# Patient Record
Sex: FEMALE | Race: WHITE | NOT HISPANIC OR LATINO | Employment: OTHER | URBAN - NONMETROPOLITAN AREA
[De-identification: names, ages, dates, MRNs, and addresses within clinical notes are randomized per-mention and may not be internally consistent; named-entity substitution may affect disease eponyms.]

---

## 2017-09-28 ENCOUNTER — HOSPITAL ENCOUNTER (EMERGENCY)
Facility: HOSPITAL | Age: 57
Discharge: ANOTHER HOSPITAL | End: 2017-09-28
Attending: EMERGENCY MEDICINE | Admitting: EMERGENCY MEDICINE

## 2017-09-28 ENCOUNTER — PREP FOR SURGERY (OUTPATIENT)
Dept: OTHER | Facility: HOSPITAL | Age: 57
End: 2017-09-28

## 2017-09-28 ENCOUNTER — APPOINTMENT (OUTPATIENT)
Dept: GENERAL RADIOLOGY | Facility: HOSPITAL | Age: 57
End: 2017-09-28
Attending: EMERGENCY MEDICINE

## 2017-09-28 ENCOUNTER — HOSPITAL ENCOUNTER (OUTPATIENT)
Facility: HOSPITAL | Age: 57
Setting detail: OBSERVATION
Discharge: HOME | End: 2017-09-29
Attending: INTERNAL MEDICINE | Admitting: INTERNAL MEDICINE

## 2017-09-28 VITALS
RESPIRATION RATE: 20 BRPM | HEART RATE: 81 BPM | WEIGHT: 270 LBS | HEIGHT: 69 IN | DIASTOLIC BLOOD PRESSURE: 73 MMHG | SYSTOLIC BLOOD PRESSURE: 116 MMHG | OXYGEN SATURATION: 93 % | BODY MASS INDEX: 39.99 KG/M2 | TEMPERATURE: 98.2 F

## 2017-09-28 DIAGNOSIS — I47.1 SVT (SUPRAVENTRICULAR TACHYCARDIA) (HCC): Primary | ICD-10-CM

## 2017-09-28 DIAGNOSIS — R00.0 WIDE-COMPLEX TACHYCARDIA: ICD-10-CM

## 2017-09-28 DIAGNOSIS — I10 ESSENTIAL HYPERTENSION: ICD-10-CM

## 2017-09-28 DIAGNOSIS — R00.0 WIDE-COMPLEX TACHYCARDIA: Primary | ICD-10-CM

## 2017-09-28 DIAGNOSIS — I50.32 CHRONIC DIASTOLIC CONGESTIVE HEART FAILURE (HCC): ICD-10-CM

## 2017-09-28 DIAGNOSIS — I48.0 PAROXYSMAL ATRIAL FIBRILLATION (HCC): ICD-10-CM

## 2017-09-28 PROBLEM — Z72.0 TOBACCO ABUSE: Status: ACTIVE | Noted: 2017-09-28

## 2017-09-28 PROBLEM — I47.10 SVT (SUPRAVENTRICULAR TACHYCARDIA): Status: ACTIVE | Noted: 2017-09-28

## 2017-09-28 PROBLEM — I48.91 A-FIB (HCC): Status: ACTIVE | Noted: 2017-09-28

## 2017-09-28 LAB
ALBUMIN SERPL-MCNC: 4.1 G/DL (ref 3.2–4.8)
ALBUMIN SERPL-MCNC: 4.4 G/DL (ref 3.5–5)
ALBUMIN/GLOB SERPL: 1.4 G/DL (ref 1–2)
ALBUMIN/GLOB SERPL: 1.6 G/DL (ref 1.5–2.5)
ALP SERPL-CCNC: 78 U/L (ref 25–100)
ALP SERPL-CCNC: 82 U/L (ref 38–126)
ALT SERPL W P-5'-P-CCNC: 17 U/L (ref 7–40)
ALT SERPL W P-5'-P-CCNC: 34 U/L (ref 13–69)
ANION GAP SERPL CALCULATED.3IONS-SCNC: 17.7 MMOL/L
ANION GAP SERPL CALCULATED.3IONS-SCNC: 3 MMOL/L (ref 3–11)
AST SERPL-CCNC: 14 U/L (ref 0–33)
AST SERPL-CCNC: 21 U/L (ref 15–46)
BASOPHILS # BLD AUTO: 0.07 10*3/MM3 (ref 0–0.2)
BASOPHILS # BLD AUTO: 0.1 10*3/MM3 (ref 0–0.2)
BASOPHILS NFR BLD AUTO: 0.8 % (ref 0–1)
BASOPHILS NFR BLD AUTO: 1 % (ref 0–2.5)
BILIRUB SERPL-MCNC: 0.5 MG/DL (ref 0.3–1.2)
BILIRUB SERPL-MCNC: 0.7 MG/DL (ref 0.2–1.3)
BILIRUB UR QL STRIP: NEGATIVE
BUN BLD-MCNC: 22 MG/DL (ref 9–23)
BUN BLD-MCNC: 26 MG/DL (ref 7–20)
BUN/CREAT SERPL: 27.5 (ref 7–25)
BUN/CREAT SERPL: 28.9 (ref 7.1–23.5)
CALCIUM SPEC-SCNC: 10 MG/DL (ref 8.4–10.2)
CALCIUM SPEC-SCNC: 9.3 MG/DL (ref 8.7–10.4)
CHLORIDE SERPL-SCNC: 104 MMOL/L (ref 99–109)
CHLORIDE SERPL-SCNC: 99 MMOL/L (ref 98–107)
CLARITY UR: CLEAR
CO2 SERPL-SCNC: 33 MMOL/L (ref 26–30)
CO2 SERPL-SCNC: 34 MMOL/L (ref 20–31)
COLOR UR: YELLOW
CREAT BLD-MCNC: 0.8 MG/DL (ref 0.6–1.3)
CREAT BLD-MCNC: 0.9 MG/DL (ref 0.6–1.3)
DEPRECATED RDW RBC AUTO: 55.3 FL (ref 37–54)
DEPRECATED RDW RBC AUTO: 55.8 FL (ref 37–54)
EOSINOPHIL # BLD AUTO: 0.08 10*3/MM3 (ref 0–0.3)
EOSINOPHIL # BLD AUTO: 0.1 10*3/MM3 (ref 0–0.7)
EOSINOPHIL NFR BLD AUTO: 0.9 % (ref 0–3)
EOSINOPHIL NFR BLD AUTO: 1 % (ref 0–7)
ERYTHROCYTE [DISTWIDTH] IN BLOOD BY AUTOMATED COUNT: 15.6 % (ref 11.5–14.5)
ERYTHROCYTE [DISTWIDTH] IN BLOOD BY AUTOMATED COUNT: 15.8 % (ref 11.3–14.5)
GFR SERPL CREATININE-BSD FRML MDRD: 64 ML/MIN/1.73
GFR SERPL CREATININE-BSD FRML MDRD: 74 ML/MIN/1.73
GLOBULIN UR ELPH-MCNC: 2.5 GM/DL
GLOBULIN UR ELPH-MCNC: 3.2 GM/DL
GLUCOSE BLD-MCNC: 125 MG/DL (ref 74–98)
GLUCOSE BLD-MCNC: 92 MG/DL (ref 70–100)
GLUCOSE UR STRIP-MCNC: NEGATIVE MG/DL
HCT VFR BLD AUTO: 44.7 % (ref 34.5–44)
HCT VFR BLD AUTO: 47.7 % (ref 37–47)
HGB BLD-MCNC: 14.4 G/DL (ref 11.5–15.5)
HGB BLD-MCNC: 15.1 G/DL (ref 12–16)
HGB UR QL STRIP.AUTO: NEGATIVE
HOLD SPECIMEN: NORMAL
HOLD SPECIMEN: NORMAL
IMM GRANULOCYTES # BLD: 0.03 10*3/MM3 (ref 0–0.03)
IMM GRANULOCYTES # BLD: 0.04 10*3/MM3 (ref 0–0.06)
IMM GRANULOCYTES NFR BLD: 0.3 % (ref 0–0.6)
IMM GRANULOCYTES NFR BLD: 0.4 % (ref 0–0.6)
KETONES UR QL STRIP: NEGATIVE
LEUKOCYTE ESTERASE UR QL STRIP.AUTO: NEGATIVE
LYMPHOCYTES # BLD AUTO: 1.16 10*3/MM3 (ref 0.6–3.4)
LYMPHOCYTES # BLD AUTO: 1.9 10*3/MM3 (ref 0.6–4.8)
LYMPHOCYTES NFR BLD AUTO: 12.1 % (ref 10–50)
LYMPHOCYTES NFR BLD AUTO: 20.5 % (ref 24–44)
MAGNESIUM SERPL-MCNC: 2.4 MG/DL (ref 1.3–2.7)
MAGNESIUM SERPL-MCNC: 2.5 MG/DL (ref 1.3–2.7)
MCH RBC QN AUTO: 30.7 PG (ref 27–31)
MCH RBC QN AUTO: 31.2 PG (ref 27–31)
MCHC RBC AUTO-ENTMCNC: 31.7 G/DL (ref 30–37)
MCHC RBC AUTO-ENTMCNC: 32.2 G/DL (ref 32–36)
MCV RBC AUTO: 97 FL (ref 80–99)
MCV RBC AUTO: 97 FL (ref 81–99)
MONOCYTES # BLD AUTO: 0.73 10*3/MM3 (ref 0–1)
MONOCYTES # BLD AUTO: 0.74 10*3/MM3 (ref 0–0.9)
MONOCYTES NFR BLD AUTO: 7.7 % (ref 0–12)
MONOCYTES NFR BLD AUTO: 7.9 % (ref 0–12)
NEUTROPHILS # BLD AUTO: 6.46 10*3/MM3 (ref 1.5–8.3)
NEUTROPHILS # BLD AUTO: 7.46 10*3/MM3 (ref 2–6.9)
NEUTROPHILS NFR BLD AUTO: 69.6 % (ref 41–71)
NEUTROPHILS NFR BLD AUTO: 77.8 % (ref 37–80)
NITRITE UR QL STRIP: NEGATIVE
NRBC BLD MANUAL-RTO: 0 /100 WBC (ref 0–0)
NT-PROBNP SERPL-MCNC: 49.5 PG/ML (ref 0–125)
PH UR STRIP.AUTO: 6 [PH] (ref 5–8)
PLATELET # BLD AUTO: 277 10*3/MM3 (ref 150–450)
PLATELET # BLD AUTO: 280 10*3/MM3 (ref 130–400)
PMV BLD AUTO: 10 FL (ref 6–12)
PMV BLD AUTO: 10.3 FL (ref 6–12)
POTASSIUM BLD-SCNC: 3.7 MMOL/L (ref 3.5–5.1)
POTASSIUM BLD-SCNC: 3.9 MMOL/L (ref 3.5–5.5)
PROT SERPL-MCNC: 6.6 G/DL (ref 5.7–8.2)
PROT SERPL-MCNC: 7.6 G/DL (ref 6.3–8.2)
PROT UR QL STRIP: NEGATIVE
RBC # BLD AUTO: 4.61 10*6/MM3 (ref 3.89–5.14)
RBC # BLD AUTO: 4.92 10*6/MM3 (ref 4.2–5.4)
SODIUM BLD-SCNC: 141 MMOL/L (ref 132–146)
SODIUM BLD-SCNC: 146 MMOL/L (ref 137–145)
SP GR UR STRIP: <=1.005 (ref 1–1.03)
T4 FREE SERPL-MCNC: 1.21 NG/DL (ref 0.89–1.76)
TROPONIN I SERPL-MCNC: 0 NG/ML (ref 0–0.05)
TROPONIN I SERPL-MCNC: 0.07 NG/ML
TROPONIN I SERPL-MCNC: <0.012 NG/ML (ref 0–0.03)
TSH SERPL DL<=0.05 MIU/L-ACNC: 21.68 MIU/ML (ref 0.35–5.35)
UROBILINOGEN UR QL STRIP: NORMAL
WBC NRBC COR # BLD: 9.27 10*3/MM3 (ref 3.5–10.8)
WBC NRBC COR # BLD: 9.6 10*3/MM3 (ref 4.8–10.8)
WHOLE BLOOD HOLD SPECIMEN: NORMAL
WHOLE BLOOD HOLD SPECIMEN: NORMAL

## 2017-09-28 PROCEDURE — 84439 ASSAY OF FREE THYROXINE: CPT | Performed by: INTERNAL MEDICINE

## 2017-09-28 PROCEDURE — 99285 EMERGENCY DEPT VISIT HI MDM: CPT

## 2017-09-28 PROCEDURE — 25010000002 ENOXAPARIN PER 10 MG: Performed by: INTERNAL MEDICINE

## 2017-09-28 PROCEDURE — 96372 THER/PROPH/DIAG INJ SC/IM: CPT

## 2017-09-28 PROCEDURE — 84484 ASSAY OF TROPONIN QUANT: CPT | Performed by: INTERNAL MEDICINE

## 2017-09-28 PROCEDURE — 80050 GENERAL HEALTH PANEL: CPT | Performed by: EMERGENCY MEDICINE

## 2017-09-28 PROCEDURE — 83735 ASSAY OF MAGNESIUM: CPT | Performed by: INTERNAL MEDICINE

## 2017-09-28 PROCEDURE — 93005 ELECTROCARDIOGRAM TRACING: CPT | Performed by: EMERGENCY MEDICINE

## 2017-09-28 PROCEDURE — 93010 ELECTROCARDIOGRAM REPORT: CPT | Performed by: INTERNAL MEDICINE

## 2017-09-28 PROCEDURE — G0378 HOSPITAL OBSERVATION PER HR: HCPCS

## 2017-09-28 PROCEDURE — 93005 ELECTROCARDIOGRAM TRACING: CPT | Performed by: INTERNAL MEDICINE

## 2017-09-28 PROCEDURE — 83880 ASSAY OF NATRIURETIC PEPTIDE: CPT | Performed by: EMERGENCY MEDICINE

## 2017-09-28 PROCEDURE — 85025 COMPLETE CBC W/AUTO DIFF WBC: CPT | Performed by: EMERGENCY MEDICINE

## 2017-09-28 PROCEDURE — 81003 URINALYSIS AUTO W/O SCOPE: CPT | Performed by: EMERGENCY MEDICINE

## 2017-09-28 PROCEDURE — 71010 HC CHEST PA OR AP: CPT

## 2017-09-28 PROCEDURE — 84484 ASSAY OF TROPONIN QUANT: CPT | Performed by: EMERGENCY MEDICINE

## 2017-09-28 PROCEDURE — 80053 COMPREHEN METABOLIC PANEL: CPT | Performed by: INTERNAL MEDICINE

## 2017-09-28 PROCEDURE — 99219 PR INITIAL OBSERVATION CARE/DAY 50 MINUTES: CPT | Performed by: INTERNAL MEDICINE

## 2017-09-28 RX ORDER — MAGNESIUM SULFATE HEPTAHYDRATE 40 MG/ML
2 INJECTION, SOLUTION INTRAVENOUS AS NEEDED
Status: DISCONTINUED | OUTPATIENT
Start: 2017-09-28 | End: 2017-09-29 | Stop reason: HOSPADM

## 2017-09-28 RX ORDER — ALBUTEROL SULFATE 90 UG/1
2 AEROSOL, METERED RESPIRATORY (INHALATION) EVERY 4 HOURS PRN
COMMUNITY

## 2017-09-28 RX ORDER — ATENOLOL 25 MG/1
25 TABLET ORAL
Status: DISCONTINUED | OUTPATIENT
Start: 2017-09-28 | End: 2017-09-28

## 2017-09-28 RX ORDER — ATENOLOL 25 MG/1
25 TABLET ORAL DAILY
COMMUNITY
End: 2017-09-29 | Stop reason: HOSPADM

## 2017-09-28 RX ORDER — LEVOTHYROXINE SODIUM 0.2 MG/1
200 TABLET ORAL EVERY MORNING
COMMUNITY

## 2017-09-28 RX ORDER — ASPIRIN 325 MG
325 TABLET ORAL DAILY
Status: DISCONTINUED | OUTPATIENT
Start: 2017-09-28 | End: 2017-09-29

## 2017-09-28 RX ORDER — SODIUM CHLORIDE 0.9 % (FLUSH) 0.9 %
1-10 SYRINGE (ML) INJECTION AS NEEDED
Status: DISCONTINUED | OUTPATIENT
Start: 2017-09-28 | End: 2017-09-29 | Stop reason: HOSPADM

## 2017-09-28 RX ORDER — METOPROLOL TARTRATE 50 MG/1
50 TABLET, FILM COATED ORAL ONCE
Status: COMPLETED | OUTPATIENT
Start: 2017-09-28 | End: 2017-09-28

## 2017-09-28 RX ORDER — ASPIRIN 325 MG
325 TABLET ORAL DAILY
COMMUNITY
End: 2017-09-29 | Stop reason: HOSPADM

## 2017-09-28 RX ORDER — FUROSEMIDE 40 MG/1
80 TABLET ORAL 2 TIMES DAILY
Status: DISCONTINUED | OUTPATIENT
Start: 2017-09-28 | End: 2017-09-29 | Stop reason: HOSPADM

## 2017-09-28 RX ORDER — PREDNISONE 1 MG/1
5 TABLET ORAL DAILY
Status: DISCONTINUED | OUTPATIENT
Start: 2017-09-28 | End: 2017-09-29 | Stop reason: HOSPADM

## 2017-09-28 RX ORDER — LEVOTHYROXINE SODIUM 0.1 MG/1
200 TABLET ORAL DAILY
Status: DISCONTINUED | OUTPATIENT
Start: 2017-09-28 | End: 2017-09-29 | Stop reason: HOSPADM

## 2017-09-28 RX ORDER — POTASSIUM CHLORIDE 1.5 G/1.77G
40 POWDER, FOR SOLUTION ORAL AS NEEDED
Status: DISCONTINUED | OUTPATIENT
Start: 2017-09-28 | End: 2017-09-29 | Stop reason: HOSPADM

## 2017-09-28 RX ORDER — MAGNESIUM SULFATE HEPTAHYDRATE 40 MG/ML
4 INJECTION, SOLUTION INTRAVENOUS AS NEEDED
Status: DISCONTINUED | OUTPATIENT
Start: 2017-09-28 | End: 2017-09-29 | Stop reason: HOSPADM

## 2017-09-28 RX ORDER — POTASSIUM CHLORIDE 750 MG/1
40 CAPSULE, EXTENDED RELEASE ORAL AS NEEDED
Status: DISCONTINUED | OUTPATIENT
Start: 2017-09-28 | End: 2017-09-29 | Stop reason: HOSPADM

## 2017-09-28 RX ORDER — FUROSEMIDE 80 MG
80 TABLET ORAL 2 TIMES DAILY
Status: ON HOLD | COMMUNITY
End: 2017-09-29

## 2017-09-28 RX ORDER — SODIUM CHLORIDE 0.9 % (FLUSH) 0.9 %
10 SYRINGE (ML) INJECTION AS NEEDED
Status: DISCONTINUED | OUTPATIENT
Start: 2017-09-28 | End: 2017-09-28 | Stop reason: HOSPADM

## 2017-09-28 RX ORDER — PREDNISONE 1 MG/1
5 TABLET ORAL EVERY MORNING
COMMUNITY

## 2017-09-28 RX ADMIN — METOPROLOL TARTRATE 25 MG: 25 TABLET ORAL at 21:39

## 2017-09-28 RX ADMIN — METOPROLOL TARTRATE 50 MG: 50 TABLET ORAL at 13:25

## 2017-09-28 RX ADMIN — FUROSEMIDE 80 MG: 40 TABLET ORAL at 18:47

## 2017-09-28 RX ADMIN — ENOXAPARIN SODIUM 40 MG: 40 INJECTION SUBCUTANEOUS at 21:39

## 2017-09-29 ENCOUNTER — TELEPHONE (OUTPATIENT)
Dept: CARDIOLOGY | Facility: CLINIC | Age: 57
End: 2017-09-29

## 2017-09-29 ENCOUNTER — APPOINTMENT (OUTPATIENT)
Dept: CARDIOLOGY | Facility: HOSPITAL | Age: 57
End: 2017-09-29
Attending: INTERNAL MEDICINE

## 2017-09-29 VITALS
RESPIRATION RATE: 15 BRPM | BODY MASS INDEX: 38.69 KG/M2 | HEIGHT: 69 IN | TEMPERATURE: 97.8 F | WEIGHT: 261.2 LBS | DIASTOLIC BLOOD PRESSURE: 56 MMHG | HEART RATE: 71 BPM | SYSTOLIC BLOOD PRESSURE: 107 MMHG | OXYGEN SATURATION: 95 %

## 2017-09-29 DIAGNOSIS — I47.1 SVT (SUPRAVENTRICULAR TACHYCARDIA) (HCC): ICD-10-CM

## 2017-09-29 DIAGNOSIS — I48.92 ATRIAL FLUTTER, UNSPECIFIED TYPE (HCC): Primary | ICD-10-CM

## 2017-09-29 LAB
ANION GAP SERPL CALCULATED.3IONS-SCNC: 5 MMOL/L (ref 3–11)
ARTICHOKE IGE QN: 72 MG/DL (ref 0–130)
BH CV ECHO MEAS - AO ROOT AREA (BSA CORRECTED): 1.4
BH CV ECHO MEAS - AO ROOT AREA: 8.7 CM^2
BH CV ECHO MEAS - AO ROOT DIAM: 3.3 CM
BH CV ECHO MEAS - BSA(HAYCOCK): 2.5 M^2
BH CV ECHO MEAS - BSA: 2.3 M^2
BH CV ECHO MEAS - BZI_BMI: 38.5 KILOGRAMS/M^2
BH CV ECHO MEAS - BZI_METRIC_HEIGHT: 175.3 CM
BH CV ECHO MEAS - BZI_METRIC_WEIGHT: 118.4 KG
BH CV ECHO MEAS - CONTRAST EF (2CH): 51.4 ML/M^2
BH CV ECHO MEAS - CONTRAST EF 4CH: 48.4 ML/M^2
BH CV ECHO MEAS - EDV(CUBED): 121.7 ML
BH CV ECHO MEAS - EDV(MOD-SP2): 70 ML
BH CV ECHO MEAS - EDV(MOD-SP4): 122 ML
BH CV ECHO MEAS - EDV(TEICH): 115.8 ML
BH CV ECHO MEAS - EF(CUBED): 61.8 %
BH CV ECHO MEAS - EF(MOD-SP2): 51.4 %
BH CV ECHO MEAS - EF(MOD-SP4): 48.4 %
BH CV ECHO MEAS - EF(TEICH): 53.2 %
BH CV ECHO MEAS - ESV(CUBED): 46.4 ML
BH CV ECHO MEAS - ESV(MOD-SP2): 34 ML
BH CV ECHO MEAS - ESV(MOD-SP4): 63 ML
BH CV ECHO MEAS - ESV(TEICH): 54.2 ML
BH CV ECHO MEAS - FS: 27.5 %
BH CV ECHO MEAS - IVS/LVPW: 1.1
BH CV ECHO MEAS - IVSD: 1.1 CM
BH CV ECHO MEAS - LA DIMENSION: 2.8 CM
BH CV ECHO MEAS - LA/AO: 0.86
BH CV ECHO MEAS - LAT PEAK E' VEL: 6.3 CM/SEC
BH CV ECHO MEAS - LV DIASTOLIC VOL/BSA (35-75): 52.7 ML/M^2
BH CV ECHO MEAS - LV MASS(C)D: 194.2 GRAMS
BH CV ECHO MEAS - LV MASS(C)DI: 83.9 GRAMS/M^2
BH CV ECHO MEAS - LV MAX PG: 2.7 MMHG
BH CV ECHO MEAS - LV MEAN PG: 1.4 MMHG
BH CV ECHO MEAS - LV SYSTOLIC VOL/BSA (12-30): 27.2 ML/M^2
BH CV ECHO MEAS - LV V1 MAX: 82.4 CM/SEC
BH CV ECHO MEAS - LV V1 MEAN: 54.5 CM/SEC
BH CV ECHO MEAS - LV V1 VTI: 17.4 CM
BH CV ECHO MEAS - LVIDD: 5 CM
BH CV ECHO MEAS - LVIDS: 3.6 CM
BH CV ECHO MEAS - LVLD AP2: 7.1 CM
BH CV ECHO MEAS - LVLD AP4: 8 CM
BH CV ECHO MEAS - LVLS AP2: 6.3 CM
BH CV ECHO MEAS - LVLS AP4: 7.2 CM
BH CV ECHO MEAS - LVOT AREA (M): 3.5 CM^2
BH CV ECHO MEAS - LVOT AREA: 3.4 CM^2
BH CV ECHO MEAS - LVOT DIAM: 2.1 CM
BH CV ECHO MEAS - LVPWD: 1 CM
BH CV ECHO MEAS - MED PEAK E' VEL: 7.74 CM/SEC
BH CV ECHO MEAS - MV A MAX VEL: 66.1 CM/SEC
BH CV ECHO MEAS - MV E MAX VEL: 69.6 CM/SEC
BH CV ECHO MEAS - MV E/A: 1.1
BH CV ECHO MEAS - PA ACC SLOPE: 526 CM/SEC^2
BH CV ECHO MEAS - PA ACC TIME: 0.11 SEC
BH CV ECHO MEAS - PA PR(ACCEL): 27.9 MMHG
BH CV ECHO MEAS - RAP SYSTOLE: 8 MMHG
BH CV ECHO MEAS - RVDD: 2.1 CM
BH CV ECHO MEAS - RVSP: 24.1 MMHG
BH CV ECHO MEAS - SI(CUBED): 32.5 ML/M^2
BH CV ECHO MEAS - SI(LVOT): 26 ML/M^2
BH CV ECHO MEAS - SI(MOD-SP2): 15.6 ML/M^2
BH CV ECHO MEAS - SI(MOD-SP4): 25.5 ML/M^2
BH CV ECHO MEAS - SI(TEICH): 26.6 ML/M^2
BH CV ECHO MEAS - SV(CUBED): 75.3 ML
BH CV ECHO MEAS - SV(LVOT): 60.1 ML
BH CV ECHO MEAS - SV(MOD-SP2): 36 ML
BH CV ECHO MEAS - SV(MOD-SP4): 59 ML
BH CV ECHO MEAS - SV(TEICH): 61.6 ML
BH CV ECHO MEAS - TAPSE (>1.6): 2 CM2
BH CV ECHO MEAS - TR MAX VEL: 200.9 CM/SEC
BH CV VAS BP RIGHT ARM: NORMAL MMHG
BH CV XLRA - RV BASE: 4.7 CM
BH CV XLRA - RV LENGTH: 6.3 CM
BH CV XLRA - RV MID: 4.3 CM
BH CV XLRA - TDI S': 10.7 CM/SEC
BUN BLD-MCNC: 20 MG/DL (ref 9–23)
BUN/CREAT SERPL: 28.6 (ref 7–25)
CALCIUM SPEC-SCNC: 9 MG/DL (ref 8.7–10.4)
CHLORIDE SERPL-SCNC: 104 MMOL/L (ref 99–109)
CHOLEST SERPL-MCNC: 133 MG/DL (ref 0–200)
CO2 SERPL-SCNC: 34 MMOL/L (ref 20–31)
CREAT BLD-MCNC: 0.7 MG/DL (ref 0.6–1.3)
E/E' RATIO: 11
GFR SERPL CREATININE-BSD FRML MDRD: 86 ML/MIN/1.73
GLUCOSE BLD-MCNC: 95 MG/DL (ref 70–100)
HDLC SERPL-MCNC: 44 MG/DL (ref 40–60)
LEFT ATRIUM VOLUME INDEX: 12.1 ML/M2
LV EF 2D ECHO EST: 55 %
MAGNESIUM SERPL-MCNC: 2.4 MG/DL (ref 1.3–2.7)
POTASSIUM BLD-SCNC: 3.3 MMOL/L (ref 3.5–5.5)
SODIUM BLD-SCNC: 143 MMOL/L (ref 132–146)
TRIGL SERPL-MCNC: 105 MG/DL (ref 0–150)
TROPONIN I SERPL-MCNC: 0.03 NG/ML

## 2017-09-29 PROCEDURE — G0378 HOSPITAL OBSERVATION PER HR: HCPCS

## 2017-09-29 PROCEDURE — 94799 UNLISTED PULMONARY SVC/PX: CPT

## 2017-09-29 PROCEDURE — 93010 ELECTROCARDIOGRAM REPORT: CPT | Performed by: INTERNAL MEDICINE

## 2017-09-29 PROCEDURE — 80061 LIPID PANEL: CPT | Performed by: INTERNAL MEDICINE

## 2017-09-29 PROCEDURE — 84484 ASSAY OF TROPONIN QUANT: CPT | Performed by: INTERNAL MEDICINE

## 2017-09-29 PROCEDURE — 63710000001 PREDNISONE PER 5 MG: Performed by: INTERNAL MEDICINE

## 2017-09-29 PROCEDURE — 83735 ASSAY OF MAGNESIUM: CPT | Performed by: INTERNAL MEDICINE

## 2017-09-29 PROCEDURE — 99217 PR OBSERVATION CARE DISCHARGE MANAGEMENT: CPT | Performed by: INTERNAL MEDICINE

## 2017-09-29 PROCEDURE — 93005 ELECTROCARDIOGRAM TRACING: CPT | Performed by: INTERNAL MEDICINE

## 2017-09-29 PROCEDURE — 80048 BASIC METABOLIC PNL TOTAL CA: CPT | Performed by: INTERNAL MEDICINE

## 2017-09-29 PROCEDURE — 93306 TTE W/DOPPLER COMPLETE: CPT | Performed by: INTERNAL MEDICINE

## 2017-09-29 PROCEDURE — 93306 TTE W/DOPPLER COMPLETE: CPT

## 2017-09-29 RX ORDER — METOPROLOL TARTRATE 50 MG/1
50 TABLET, FILM COATED ORAL 2 TIMES DAILY
Qty: 60 TABLET | Refills: 5 | Status: SHIPPED | OUTPATIENT
Start: 2017-09-29 | End: 2017-11-07 | Stop reason: SDUPTHER

## 2017-09-29 RX ORDER — POTASSIUM CHLORIDE 20 MEQ/1
20 TABLET, EXTENDED RELEASE ORAL DAILY
Qty: 90 TABLET | Refills: 3 | Status: SHIPPED | OUTPATIENT
Start: 2017-09-29

## 2017-09-29 RX ORDER — FUROSEMIDE 80 MG
80 TABLET ORAL DAILY
Qty: 30 TABLET | Refills: 5 | Status: SHIPPED | OUTPATIENT
Start: 2017-09-29 | End: 2018-03-28

## 2017-09-29 RX ADMIN — RIVAROXABAN 20 MG: 20 TABLET, FILM COATED ORAL at 17:05

## 2017-09-29 RX ADMIN — FUROSEMIDE 80 MG: 40 TABLET ORAL at 08:28

## 2017-09-29 RX ADMIN — ASPIRIN 325 MG ORAL TABLET 325 MG: 325 PILL ORAL at 08:28

## 2017-09-29 RX ADMIN — POTASSIUM CHLORIDE 40 MEQ: 750 CAPSULE, EXTENDED RELEASE ORAL at 10:03

## 2017-09-29 RX ADMIN — PREDNISONE 5 MG: 5 TABLET ORAL at 08:29

## 2017-09-29 RX ADMIN — POTASSIUM CHLORIDE 40 MEQ: 750 CAPSULE, EXTENDED RELEASE ORAL at 13:14

## 2017-09-29 RX ADMIN — LEVOTHYROXINE SODIUM 200 MCG: 100 TABLET ORAL at 08:29

## 2017-09-29 RX ADMIN — METOPROLOL TARTRATE 25 MG: 25 TABLET ORAL at 08:29

## 2017-10-02 PROBLEM — I47.10 SVT (SUPRAVENTRICULAR TACHYCARDIA): Status: ACTIVE | Noted: 2017-10-02

## 2017-10-02 PROBLEM — I48.92 ATRIAL FLUTTER (HCC): Status: ACTIVE | Noted: 2017-10-02

## 2017-10-02 PROBLEM — I47.1 SVT (SUPRAVENTRICULAR TACHYCARDIA) (HCC): Status: ACTIVE | Noted: 2017-10-02

## 2017-10-05 ENCOUNTER — HOSPITAL ENCOUNTER (EMERGENCY)
Facility: HOSPITAL | Age: 57
Discharge: HOME | End: 2017-10-05
Attending: EMERGENCY MEDICINE | Admitting: EMERGENCY MEDICINE

## 2017-10-05 ENCOUNTER — APPOINTMENT (OUTPATIENT)
Dept: GENERAL RADIOLOGY | Facility: HOSPITAL | Age: 57
End: 2017-10-05

## 2017-10-05 VITALS
BODY MASS INDEX: 38.66 KG/M2 | RESPIRATION RATE: 16 BRPM | SYSTOLIC BLOOD PRESSURE: 105 MMHG | HEART RATE: 71 BPM | DIASTOLIC BLOOD PRESSURE: 60 MMHG | TEMPERATURE: 98 F | HEIGHT: 69 IN | OXYGEN SATURATION: 93 % | WEIGHT: 261 LBS

## 2017-10-05 DIAGNOSIS — R00.2 HEART PALPITATIONS: Primary | ICD-10-CM

## 2017-10-05 DIAGNOSIS — Z86.79 HISTORY OF ATRIAL FLUTTER: ICD-10-CM

## 2017-10-05 DIAGNOSIS — Z86.79 HISTORY OF PSVT (PAROXYSMAL SUPRAVENTRICULAR TACHYCARDIA): ICD-10-CM

## 2017-10-05 LAB
ALBUMIN SERPL-MCNC: 4.2 G/DL (ref 3.2–4.8)
ALBUMIN/GLOB SERPL: 1.4 G/DL (ref 1.5–2.5)
ALP SERPL-CCNC: 76 U/L (ref 25–100)
ALT SERPL W P-5'-P-CCNC: 22 U/L (ref 7–40)
ANION GAP SERPL CALCULATED.3IONS-SCNC: 6 MMOL/L (ref 3–11)
AST SERPL-CCNC: 15 U/L (ref 0–33)
BASOPHILS # BLD AUTO: 0.06 10*3/MM3 (ref 0–0.2)
BASOPHILS NFR BLD AUTO: 0.6 % (ref 0–1)
BILIRUB SERPL-MCNC: 0.4 MG/DL (ref 0.3–1.2)
BUN BLD-MCNC: 20 MG/DL (ref 9–23)
BUN/CREAT SERPL: 28.6 (ref 7–25)
CALCIUM SPEC-SCNC: 9.5 MG/DL (ref 8.7–10.4)
CHLORIDE SERPL-SCNC: 103 MMOL/L (ref 99–109)
CO2 SERPL-SCNC: 32 MMOL/L (ref 20–31)
CREAT BLD-MCNC: 0.7 MG/DL (ref 0.6–1.3)
DEPRECATED RDW RBC AUTO: 53.2 FL (ref 37–54)
EOSINOPHIL # BLD AUTO: 0.06 10*3/MM3 (ref 0–0.3)
EOSINOPHIL NFR BLD AUTO: 0.6 % (ref 0–3)
ERYTHROCYTE [DISTWIDTH] IN BLOOD BY AUTOMATED COUNT: 15.1 % (ref 11.3–14.5)
GFR SERPL CREATININE-BSD FRML MDRD: 86 ML/MIN/1.73
GLOBULIN UR ELPH-MCNC: 2.9 GM/DL
GLUCOSE BLD-MCNC: 99 MG/DL (ref 70–100)
HCT VFR BLD AUTO: 46.1 % (ref 34.5–44)
HGB BLD-MCNC: 15 G/DL (ref 11.5–15.5)
HOLD SPECIMEN: NORMAL
HOLD SPECIMEN: NORMAL
IMM GRANULOCYTES # BLD: 0.02 10*3/MM3 (ref 0–0.03)
IMM GRANULOCYTES NFR BLD: 0.2 % (ref 0–0.6)
LYMPHOCYTES # BLD AUTO: 1.49 10*3/MM3 (ref 0.6–4.8)
LYMPHOCYTES NFR BLD AUTO: 15.8 % (ref 24–44)
MAGNESIUM SERPL-MCNC: 2.1 MG/DL (ref 1.3–2.7)
MCH RBC QN AUTO: 31.6 PG (ref 27–31)
MCHC RBC AUTO-ENTMCNC: 32.5 G/DL (ref 32–36)
MCV RBC AUTO: 97.1 FL (ref 80–99)
MONOCYTES # BLD AUTO: 0.57 10*3/MM3 (ref 0–1)
MONOCYTES NFR BLD AUTO: 6 % (ref 0–12)
NEUTROPHILS # BLD AUTO: 7.25 10*3/MM3 (ref 1.5–8.3)
NEUTROPHILS NFR BLD AUTO: 76.8 % (ref 41–71)
PLATELET # BLD AUTO: 258 10*3/MM3 (ref 150–450)
PMV BLD AUTO: 10.1 FL (ref 6–12)
POTASSIUM BLD-SCNC: 3.6 MMOL/L (ref 3.5–5.5)
PROT SERPL-MCNC: 7.1 G/DL (ref 5.7–8.2)
RBC # BLD AUTO: 4.75 10*6/MM3 (ref 3.89–5.14)
SODIUM BLD-SCNC: 141 MMOL/L (ref 132–146)
TROPONIN I SERPL-MCNC: 0 NG/ML (ref 0–0.07)
WBC NRBC COR # BLD: 9.45 10*3/MM3 (ref 3.5–10.8)
WHOLE BLOOD HOLD SPECIMEN: NORMAL
WHOLE BLOOD HOLD SPECIMEN: NORMAL

## 2017-10-05 PROCEDURE — 99284 EMERGENCY DEPT VISIT MOD MDM: CPT

## 2017-10-05 PROCEDURE — 93005 ELECTROCARDIOGRAM TRACING: CPT | Performed by: EMERGENCY MEDICINE

## 2017-10-05 PROCEDURE — 71010 HC CHEST PA OR AP: CPT

## 2017-10-05 PROCEDURE — 84484 ASSAY OF TROPONIN QUANT: CPT

## 2017-10-05 PROCEDURE — 80053 COMPREHEN METABOLIC PANEL: CPT | Performed by: EMERGENCY MEDICINE

## 2017-10-05 PROCEDURE — 83735 ASSAY OF MAGNESIUM: CPT | Performed by: EMERGENCY MEDICINE

## 2017-10-05 PROCEDURE — 85025 COMPLETE CBC W/AUTO DIFF WBC: CPT | Performed by: EMERGENCY MEDICINE

## 2017-10-05 RX ORDER — SODIUM CHLORIDE 0.9 % (FLUSH) 0.9 %
10 SYRINGE (ML) INJECTION AS NEEDED
Status: DISCONTINUED | OUTPATIENT
Start: 2017-10-05 | End: 2017-10-05 | Stop reason: HOSPADM

## 2017-10-05 NOTE — ED PROVIDER NOTES
Subjective   HPI Comments: Patsy Frazier is a 56 y.o.female with a hx of SVT who presents to the ED with c/o palpitations. One week ago, the pt presented to the ED with c/o palpitations. She was diagnosed with SVT. She was prescribed Metoprolol and Xarelto. This morning she developed similar palpitations, although they resolved spontaneously after 20 minutes. The pt presented to the ED for evaluation and to see if it was another episode of SVT. At the ED she has no acute symptoms, and her HR is in a normal sinus rhythm. The pt denies taking cold medication or other caffeine use.    She has an ablation procedure scheduled in one month.      Patient is a 56 y.o. female presenting with palpitations.   History provided by:  Patient and medical records  Palpitations   Palpitations quality:  Regular  Onset quality:  Sudden  Duration:  20 minutes  Timing:  Constant  Progression:  Resolved  Context: not caffeine and not stimulant use    Relieved by:  None tried  Worsened by:  Nothing  Ineffective treatments:  None tried  Associated symptoms: no chest pain, no cough, no dizziness, no nausea, no shortness of breath and no vomiting        Review of Systems   Respiratory: Negative for cough and shortness of breath.    Cardiovascular: Positive for palpitations. Negative for chest pain.   Gastrointestinal: Negative for nausea and vomiting.   Neurological: Negative for dizziness.   All other systems reviewed and are negative.      Past Medical History:   Diagnosis Date   • CHF (congestive heart failure)    • Hypothyroidism    • SVT (supraventricular tachycardia)     prior hx of        No Known Allergies    Past Surgical History:   Procedure Laterality Date   • CHOLECYSTECTOMY     • NECK SURGERY     • TUBAL ABDOMINAL LIGATION         History reviewed. No pertinent family history.    Social History     Social History   • Marital status:      Spouse name: N/A   • Number of children: N/A   • Years of education: N/A      Social History Main Topics   • Smoking status: Current Every Day Smoker     Packs/day: 1.00     Years: 40.00     Types: Cigarettes   • Smokeless tobacco: Never Used   • Alcohol use No   • Drug use: No   • Sexual activity: Defer     Other Topics Concern   • None     Social History Narrative   • None         Objective   Physical Exam   Constitutional: She is oriented to person, place, and time. She appears well-developed and well-nourished. No distress.   HENT:   Head: Normocephalic and atraumatic.   Mouth/Throat: No oropharyngeal exudate.   Eyes: Conjunctivae are normal. No scleral icterus.   Neck: Normal range of motion. Neck supple. No JVD present.   Cardiovascular: Normal rate, regular rhythm, normal heart sounds and intact distal pulses.  Exam reveals no gallop and no friction rub.    No murmur heard.  Pulmonary/Chest: Effort normal and breath sounds normal. No respiratory distress. She has no wheezes. She has no rales.   Abdominal: Soft. Bowel sounds are normal. She exhibits no distension. There is no tenderness. There is no rebound and no guarding.   Musculoskeletal: Normal range of motion. She exhibits edema.   2+ non pitting edema in the bilateral lower extremities.   Neurological: She is alert and oriented to person, place, and time.   Skin: Skin is warm and dry. She is not diaphoretic.   Psychiatric: She has a normal mood and affect. Her behavior is normal.   Nursing note and vitals reviewed.      Procedures         ED Course  ED Course   Comment By Time   Patient has a history of SVT and reports the symptoms today lasted around 20 minutes.  She is currently in a normal sinus rhythm on the EKG and monitor.  Patient reports she is currently symptom-free. Bernabe Stafford MD 10/05 1440   Dr. Rivera Vasquez, on call cardiology. Manuel Blackman 10/05 1606   I discussed the case with Dr. Villeda who advises no further alterations in medications at this time and states the patient to be  discharged home to follow-up as scheduled in November for ablation. Bernabe Stafford MD 10/05 1614   Dr. Stafford re-evaluated the pt and discussed all findings. All are agreeable with the plan. Manuel Blackman 10/05 1619     Recent Results (from the past 24 hour(s))   Comprehensive Metabolic Panel    Collection Time: 10/05/17  2:40 PM   Result Value Ref Range    Glucose 99 70 - 100 mg/dL    BUN 20 9 - 23 mg/dL    Creatinine 0.70 0.60 - 1.30 mg/dL    Sodium 141 132 - 146 mmol/L    Potassium 3.6 3.5 - 5.5 mmol/L    Chloride 103 99 - 109 mmol/L    CO2 32.0 (H) 20.0 - 31.0 mmol/L    Calcium 9.5 8.7 - 10.4 mg/dL    Total Protein 7.1 5.7 - 8.2 g/dL    Albumin 4.20 3.20 - 4.80 g/dL    ALT (SGPT) 22 7 - 40 U/L    AST (SGOT) 15 0 - 33 U/L    Alkaline Phosphatase 76 25 - 100 U/L    Total Bilirubin 0.4 0.3 - 1.2 mg/dL    eGFR Non African Amer 86 >60 mL/min/1.73    Globulin 2.9 gm/dL    A/G Ratio 1.4 (L) 1.5 - 2.5 g/dL    BUN/Creatinine Ratio 28.6 (H) 7.0 - 25.0    Anion Gap 6.0 3.0 - 11.0 mmol/L   Magnesium    Collection Time: 10/05/17  2:40 PM   Result Value Ref Range    Magnesium 2.1 1.3 - 2.7 mg/dL   Light Blue Top    Collection Time: 10/05/17  2:40 PM   Result Value Ref Range    Extra Tube hold for add-on    Green Top (Gel)    Collection Time: 10/05/17  2:40 PM   Result Value Ref Range    Extra Tube Hold for add-ons.    Lavender Top    Collection Time: 10/05/17  2:40 PM   Result Value Ref Range    Extra Tube hold for add-on    Gold Top - SST    Collection Time: 10/05/17  2:40 PM   Result Value Ref Range    Extra Tube Hold for add-ons.    CBC Auto Differential    Collection Time: 10/05/17  2:40 PM   Result Value Ref Range    WBC 9.45 3.50 - 10.80 10*3/mm3    RBC 4.75 3.89 - 5.14 10*6/mm3    Hemoglobin 15.0 11.5 - 15.5 g/dL    Hematocrit 46.1 (H) 34.5 - 44.0 %    MCV 97.1 80.0 - 99.0 fL    MCH 31.6 (H) 27.0 - 31.0 pg    MCHC 32.5 32.0 - 36.0 g/dL    RDW 15.1 (H) 11.3 - 14.5 %    RDW-SD 53.2 37.0 - 54.0 fl    MPV 10.1  6.0 - 12.0 fL    Platelets 258 150 - 450 10*3/mm3    Neutrophil % 76.8 (H) 41.0 - 71.0 %    Lymphocyte % 15.8 (L) 24.0 - 44.0 %    Monocyte % 6.0 0.0 - 12.0 %    Eosinophil % 0.6 0.0 - 3.0 %    Basophil % 0.6 0.0 - 1.0 %    Immature Grans % 0.2 0.0 - 0.6 %    Neutrophils, Absolute 7.25 1.50 - 8.30 10*3/mm3    Lymphocytes, Absolute 1.49 0.60 - 4.80 10*3/mm3    Monocytes, Absolute 0.57 0.00 - 1.00 10*3/mm3    Eosinophils, Absolute 0.06 0.00 - 0.30 10*3/mm3    Basophils, Absolute 0.06 0.00 - 0.20 10*3/mm3    Immature Grans, Absolute 0.02 0.00 - 0.03 10*3/mm3   POC Troponin, Rapid    Collection Time: 10/05/17  3:08 PM   Result Value Ref Range    Troponin I 0.00 0.00 - 0.07 ng/mL     Note: In addition to lab results from this visit, the labs listed above may include labs taken at another facility or during a different encounter within the last 24 hours. Please correlate lab times with ED admission and discharge times for further clarification of the services performed during this visit.    XR Chest 1 View   Final Result   Chronic appearing lung changes suggesting some degree of   COPD. No clearly acute disease is seen.       D:  10/05/2017   E:  10/05/2017       This report was finalized on 10/5/2017 8:20 PM by DR. Chele Fernandez MD.            Vitals:    10/05/17 1530 10/05/17 1600 10/05/17 1620 10/05/17 1634   BP: 110/64 95/62  105/60   BP Location:    Right arm   Patient Position:    Sitting   Pulse: 65 72 66 71   Resp:       Temp:       TempSrc:       SpO2: 95% 93% 95% 93%   Weight:       Height:         Medications - No data to display  ECG/EMG Results (last 24 hours)     Procedure Component Value Units Date/Time    ECG 12 Lead [[id number]] Collected:  10/05/17 1427     Updated:  10/05/17 1428                        MDM  Number of Diagnoses or Management Options  Diagnosis management comments: ECG/EMG Results (last 24 hours)     Procedure Component Value Units Date/Time    ECG 12 Lead ([id number]) Collected:  10/05/17  1427     Updated:  10/05/17 1457    Narrative:       Test Reason : Dysrhythmia triage protocol  Blood Pressure : **/** mmHG  Vent. Rate : 066 BPM     Atrial Rate : 066 BPM     P-R Int : 140 ms          QRS Dur : 084 ms      QT Int : 396 ms       P-R-T Axes : 064 062 052 degrees     QTc Int : 415 ms    Normal sinus rhythm  When compared with ECG of 29-SEP-2017 04:41, (Unconfirmed)  No significant change was found  Confirmed by OSMANI STAFFORD MD ([id number]) on 10/5/2017 2:51:31 PM    Referred By:  EDMD           Confirmed By:OSMANI STAFFORD MD             Amount and/or Complexity of Data Reviewed  Clinical lab tests: reviewed  Tests in the radiology section of CPT®: reviewed  Review and summarize past medical records: yes  Discuss the patient with other providers: yes  Independent visualization of images, tracings, or specimens: yes        Final diagnoses:   Heart palpitations   History of PSVT (paroxysmal supraventricular tachycardia)   History of atrial flutter       Documentation assistance provided by erin Blackman.  Information recorded by the scribcornelius was done at my direction and has been verified and validated by me.     Manuel Blackman  10/05/17 6540       Osmani Stafford MD  10/05/17 7185

## 2017-10-06 NOTE — DISCHARGE SUMMARY
Date of Discharge:  10/6/2017    Discharge Diagnosis: NSVT    Presenting Problem/History of Present Illness  Wide-complex tachycardia [I47.2]      Hospital Course  Patient is a 56 y.o. female history of transient palpitations over the last several years that were usually resolved with Valsalva maneuvers was received as a transfer from [facility] where she resented via EMS with complaints of rapid heart rate and palpitations and was found to have wide complex tachycardia over 200 bpm concerning for ventricular tachycardia.  After closer inspection of the patient's EKGs and telemetry strips it appears the patient has in NSVRT versus atrial flutter and not VT.  The patient underwent a echocardiogram that showed normal valvular structures and a normal left ventricular systolic function of 55%.  She was started on metoprolol tartrate with upward titration for rate control and anticoagulated with Xarelto 20 mg daily.  She is currently in normal sinus rhythm and ready for discharge. An out patient appointment has been set with EP for an evaluation by Dr. Chavo Carr for possible ablation.    Procedures Performed              Echo EF Estimated  Lab Results   Component Value Date    ECHOEFEST 55 09/29/2017         Condition on Discharge:  Stable    Physical Exam at Discharge    Vital Signs  Temp:  [98 °F (36.7 °C)] 98 °F (36.7 °C)  Heart Rate:  [65-73] 71  Resp:  [16] 16  BP: ()/(60-67) 105/60    Physical Exam:   Physical Exam   Constitutional: She is oriented to person, place, and time. She appears well-developed and well-nourished.   HENT:   Head: Normocephalic and atraumatic.   Eyes: Pupils are equal, round, and reactive to light. No scleral icterus.   Neck: No JVD present. Carotid bruit is not present. No thyromegaly present.   Cardiovascular: Normal rate, regular rhythm, S1 normal and S2 normal.  Exam reveals no gallop.    No murmur heard.  Pulmonary/Chest: Effort normal and breath sounds  normal.   Abdominal: Soft. There is no hepatosplenomegaly. There is no tenderness.   Neurological: She is alert and oriented to person, place, and time.   Skin: Skin is warm and dry. No cyanosis. Nails show no clubbing.   Psychiatric: She has a normal mood and affect. Her behavior is normal.       Discharge Disposition  Home or Self Care    Discharge Medications   Patsy Frazier   Home Medication Instructions RUBINA:[id number]    Printed on:10/06/17 1561   Medication Information                      albuterol (PROVENTIL HFA;VENTOLIN HFA) 108 (90 Base) MCG/ACT inhaler  Inhale 2 puffs Every 4 (Four) Hours As Needed for Wheezing.             b complex-C-folic acid 1 MG capsule  Take 1 capsule by mouth Daily.             furosemide (LASIX) 80 MG tablet  Take 1 tablet by mouth Daily.             levothyroxine (SYNTHROID, LEVOTHROID) 200 MCG tablet  Take 200 mcg by mouth Daily.             metoprolol tartrate (LOPRESSOR) 50 MG tablet  Take 1 tablet by mouth 2 (Two) Times a Day.             potassium chloride (K-DUR,KLOR-CON) 20 MEQ CR tablet  Take 1 tablet by mouth Daily.             predniSONE (DELTASONE) 5 MG tablet  Take 5 mg by mouth Daily.             rivaroxaban (XARELTO) 20 MG tablet  Take 1 tablet by mouth Daily With Dinner.                 Discharge Diet: Healthy Heart    Activity at Discharge: As tolerates without restrictions    Follow-up Appointments  Future Appointments  Date Time Provider Department Center   11/2/2017 10:15 AM PAT 1 [facility]  10/06/17  1:04 PM    Time: Discharge 20 min

## 2017-10-30 ENCOUNTER — PREP FOR SURGERY (OUTPATIENT)
Dept: OTHER | Facility: HOSPITAL | Age: 57
End: 2017-10-30

## 2017-10-30 DIAGNOSIS — I47.1 SVT (SUPRAVENTRICULAR TACHYCARDIA) (HCC): Primary | ICD-10-CM

## 2017-10-30 RX ORDER — PROMETHAZINE HYDROCHLORIDE 25 MG/ML
12.5 INJECTION, SOLUTION INTRAMUSCULAR; INTRAVENOUS EVERY 4 HOURS PRN
Status: CANCELLED | OUTPATIENT
Start: 2017-10-30

## 2017-10-30 RX ORDER — SODIUM CHLORIDE 0.9 % (FLUSH) 0.9 %
1-10 SYRINGE (ML) INJECTION AS NEEDED
Status: CANCELLED | OUTPATIENT
Start: 2017-10-30

## 2017-10-30 RX ORDER — ACETAMINOPHEN 325 MG/1
650 TABLET ORAL EVERY 4 HOURS PRN
Status: CANCELLED | OUTPATIENT
Start: 2017-10-30

## 2017-10-30 RX ORDER — NITROGLYCERIN 0.4 MG/1
0.4 TABLET SUBLINGUAL
Status: CANCELLED | OUTPATIENT
Start: 2017-10-30

## 2017-11-02 ENCOUNTER — APPOINTMENT (OUTPATIENT)
Dept: PREADMISSION TESTING | Facility: HOSPITAL | Age: 57
End: 2017-11-02

## 2017-11-02 DIAGNOSIS — I47.1 SVT (SUPRAVENTRICULAR TACHYCARDIA) (HCC): ICD-10-CM

## 2017-11-02 LAB
ANION GAP SERPL CALCULATED.3IONS-SCNC: 6 MMOL/L (ref 3–11)
BUN BLD-MCNC: 21 MG/DL (ref 9–23)
BUN/CREAT SERPL: 30 (ref 7–25)
CALCIUM SPEC-SCNC: 9.5 MG/DL (ref 8.7–10.4)
CHLORIDE SERPL-SCNC: 99 MMOL/L (ref 99–109)
CO2 SERPL-SCNC: 35 MMOL/L (ref 20–31)
CREAT BLD-MCNC: 0.7 MG/DL (ref 0.6–1.3)
DEPRECATED RDW RBC AUTO: 51.4 FL (ref 37–54)
ERYTHROCYTE [DISTWIDTH] IN BLOOD BY AUTOMATED COUNT: 14.3 % (ref 11.3–14.5)
GFR SERPL CREATININE-BSD FRML MDRD: 86 ML/MIN/1.73
GLUCOSE BLD-MCNC: 96 MG/DL (ref 70–100)
HBA1C MFR BLD: 6.1 % (ref 4.8–5.6)
HCT VFR BLD AUTO: 47.8 % (ref 34.5–44)
HGB BLD-MCNC: 15 G/DL (ref 11.5–15.5)
INR PPP: 1.28
MCH RBC QN AUTO: 30.7 PG (ref 27–31)
MCHC RBC AUTO-ENTMCNC: 31.4 G/DL (ref 32–36)
MCV RBC AUTO: 97.8 FL (ref 80–99)
PLATELET # BLD AUTO: 314 10*3/MM3 (ref 150–450)
PMV BLD AUTO: 10.1 FL (ref 6–12)
POTASSIUM BLD-SCNC: 3.7 MMOL/L (ref 3.5–5.5)
PROTHROMBIN TIME: 14.1 SECONDS (ref 9.6–11.5)
RBC # BLD AUTO: 4.89 10*6/MM3 (ref 3.89–5.14)
SODIUM BLD-SCNC: 140 MMOL/L (ref 132–146)
WBC NRBC COR # BLD: 10.13 10*3/MM3 (ref 3.5–10.8)

## 2017-11-02 PROCEDURE — 83036 HEMOGLOBIN GLYCOSYLATED A1C: CPT | Performed by: PHYSICIAN ASSISTANT

## 2017-11-02 PROCEDURE — 85027 COMPLETE CBC AUTOMATED: CPT | Performed by: PHYSICIAN ASSISTANT

## 2017-11-02 PROCEDURE — 85610 PROTHROMBIN TIME: CPT | Performed by: PHYSICIAN ASSISTANT

## 2017-11-02 PROCEDURE — 36415 COLL VENOUS BLD VENIPUNCTURE: CPT

## 2017-11-02 PROCEDURE — 80048 BASIC METABOLIC PNL TOTAL CA: CPT | Performed by: PHYSICIAN ASSISTANT

## 2017-11-02 RX ORDER — LEVOTHYROXINE SODIUM 0.1 MG/1
100 TABLET ORAL EVERY MORNING
COMMUNITY

## 2017-11-02 RX ORDER — NAPROXEN SODIUM 220 MG
220 TABLET ORAL 2 TIMES DAILY PRN
COMMUNITY

## 2017-11-02 NOTE — DISCHARGE INSTRUCTIONS
The following instructions given during Pre Admission Testing visit:    Do not eat or drink anything after MN except for sips of water with your a.m. Prescription meds unless otherwise instructed by your physician.    Glasses and jewelry may be worn, but dentures must be removed prior to cath/procedure.    Leave any items you consider valuable at home.    Family members may wait in [facility] waiting area during procedure.    Bring all medications in their original containers the day of procedure.    Bring photo ID and insurance cards on the day of procedure.    Need to make arrangements for transportation prior to discharge.    The following handouts were given:     Heart Cath pathway (if applicable)   Cardiac Cath booklet published by [facility]    OR appropriate [facility] procedure booklet    If applicable, pt instructed to bring CPAP mask and tubing the day of procedure.

## 2017-11-03 ENCOUNTER — HOSPITAL ENCOUNTER (OUTPATIENT)
Facility: HOSPITAL | Age: 57
LOS: 1 days | Discharge: HOME | End: 2017-11-04
Attending: INTERNAL MEDICINE | Admitting: INTERNAL MEDICINE

## 2017-11-03 DIAGNOSIS — I47.1 SVT (SUPRAVENTRICULAR TACHYCARDIA) (HCC): ICD-10-CM

## 2017-11-03 DIAGNOSIS — I48.92 ATRIAL FLUTTER, UNSPECIFIED TYPE (HCC): ICD-10-CM

## 2017-11-03 PROCEDURE — C1730 CATH, EP, 19 OR FEW ELECT: HCPCS | Performed by: INTERNAL MEDICINE

## 2017-11-03 PROCEDURE — 99152 MOD SED SAME PHYS/QHP 5/>YRS: CPT | Performed by: INTERNAL MEDICINE

## 2017-11-03 PROCEDURE — 93613 INTRACARDIAC EPHYS 3D MAPG: CPT | Performed by: INTERNAL MEDICINE

## 2017-11-03 PROCEDURE — 25010000002 ONDANSETRON PER 1 MG: Performed by: INTERNAL MEDICINE

## 2017-11-03 PROCEDURE — 25010000002 FENTANYL CITRATE (PF) 100 MCG/2ML SOLUTION: Performed by: INTERNAL MEDICINE

## 2017-11-03 PROCEDURE — 25010000002 DIPHENHYDRAMINE PER 50 MG: Performed by: INTERNAL MEDICINE

## 2017-11-03 PROCEDURE — 93653 COMPRE EP EVAL TX SVT: CPT | Performed by: INTERNAL MEDICINE

## 2017-11-03 PROCEDURE — 25010000002 MIDAZOLAM PER 1 MG: Performed by: INTERNAL MEDICINE

## 2017-11-03 PROCEDURE — 93623 PRGRMD STIMJ&PACG IV RX NFS: CPT | Performed by: INTERNAL MEDICINE

## 2017-11-03 PROCEDURE — 36415 COLL VENOUS BLD VENIPUNCTURE: CPT

## 2017-11-03 PROCEDURE — C1894 INTRO/SHEATH, NON-LASER: HCPCS | Performed by: INTERNAL MEDICINE

## 2017-11-03 PROCEDURE — 25010000002 HEPARIN (PORCINE) PER 1000 UNITS: Performed by: INTERNAL MEDICINE

## 2017-11-03 PROCEDURE — 93621 COMP EP EVL L PAC&REC C SINS: CPT | Performed by: INTERNAL MEDICINE

## 2017-11-03 PROCEDURE — C1732 CATH, EP, DIAG/ABL, 3D/VECT: HCPCS | Performed by: INTERNAL MEDICINE

## 2017-11-03 RX ORDER — FUROSEMIDE 40 MG/1
80 TABLET ORAL DAILY
Status: DISCONTINUED | OUTPATIENT
Start: 2017-11-03 | End: 2017-11-04 | Stop reason: HOSPADM

## 2017-11-03 RX ORDER — PROMETHAZINE HYDROCHLORIDE 25 MG/ML
12.5 INJECTION, SOLUTION INTRAMUSCULAR; INTRAVENOUS EVERY 4 HOURS PRN
Status: DISCONTINUED | OUTPATIENT
Start: 2017-11-03 | End: 2017-11-03 | Stop reason: HOSPADM

## 2017-11-03 RX ORDER — LIDOCAINE HYDROCHLORIDE 10 MG/ML
INJECTION, SOLUTION INFILTRATION; PERINEURAL AS NEEDED
Status: DISCONTINUED | OUTPATIENT
Start: 2017-11-03 | End: 2017-11-03 | Stop reason: HOSPADM

## 2017-11-03 RX ORDER — LEVOTHYROXINE SODIUM 0.1 MG/1
100 TABLET ORAL EVERY MORNING
Status: DISCONTINUED | OUTPATIENT
Start: 2017-11-04 | End: 2017-11-04 | Stop reason: HOSPADM

## 2017-11-03 RX ORDER — LEVOTHYROXINE SODIUM 0.1 MG/1
200 TABLET ORAL EVERY MORNING
Status: DISCONTINUED | OUTPATIENT
Start: 2017-11-04 | End: 2017-11-04 | Stop reason: HOSPADM

## 2017-11-03 RX ORDER — ACETAMINOPHEN 325 MG/1
650 TABLET ORAL EVERY 4 HOURS PRN
Status: DISCONTINUED | OUTPATIENT
Start: 2017-11-03 | End: 2017-11-03 | Stop reason: HOSPADM

## 2017-11-03 RX ORDER — HYDROCODONE BITARTRATE AND ACETAMINOPHEN 5; 325 MG/1; MG/1
1 TABLET ORAL EVERY 4 HOURS PRN
Status: DISCONTINUED | OUTPATIENT
Start: 2017-11-03 | End: 2017-11-04 | Stop reason: HOSPADM

## 2017-11-03 RX ORDER — ONDANSETRON 2 MG/ML
4 INJECTION INTRAMUSCULAR; INTRAVENOUS EVERY 6 HOURS PRN
Status: DISCONTINUED | OUTPATIENT
Start: 2017-11-03 | End: 2017-11-04 | Stop reason: HOSPADM

## 2017-11-03 RX ORDER — SODIUM CHLORIDE 9 MG/ML
INJECTION, SOLUTION INTRAVENOUS CONTINUOUS PRN
Status: DISCONTINUED | OUTPATIENT
Start: 2017-11-03 | End: 2017-11-03 | Stop reason: HOSPADM

## 2017-11-03 RX ORDER — MIDAZOLAM HYDROCHLORIDE 1 MG/ML
INJECTION INTRAMUSCULAR; INTRAVENOUS AS NEEDED
Status: DISCONTINUED | OUTPATIENT
Start: 2017-11-03 | End: 2017-11-03 | Stop reason: HOSPADM

## 2017-11-03 RX ORDER — NITROGLYCERIN 0.4 MG/1
0.4 TABLET SUBLINGUAL
Status: DISCONTINUED | OUTPATIENT
Start: 2017-11-03 | End: 2017-11-03 | Stop reason: HOSPADM

## 2017-11-03 RX ORDER — FENTANYL CITRATE 50 UG/ML
INJECTION, SOLUTION INTRAMUSCULAR; INTRAVENOUS AS NEEDED
Status: DISCONTINUED | OUTPATIENT
Start: 2017-11-03 | End: 2017-11-03 | Stop reason: HOSPADM

## 2017-11-03 RX ORDER — SODIUM CHLORIDE 0.9 % (FLUSH) 0.9 %
1-10 SYRINGE (ML) INJECTION AS NEEDED
Status: DISCONTINUED | OUTPATIENT
Start: 2017-11-03 | End: 2017-11-03 | Stop reason: HOSPADM

## 2017-11-03 RX ORDER — ONDANSETRON 2 MG/ML
INJECTION INTRAMUSCULAR; INTRAVENOUS AS NEEDED
Status: DISCONTINUED | OUTPATIENT
Start: 2017-11-03 | End: 2017-11-03 | Stop reason: HOSPADM

## 2017-11-03 RX ORDER — METOPROLOL TARTRATE 50 MG/1
50 TABLET, FILM COATED ORAL EVERY 12 HOURS SCHEDULED
Status: DISCONTINUED | OUTPATIENT
Start: 2017-11-03 | End: 2017-11-04 | Stop reason: HOSPADM

## 2017-11-03 RX ORDER — DIPHENHYDRAMINE HYDROCHLORIDE 50 MG/ML
INJECTION INTRAMUSCULAR; INTRAVENOUS AS NEEDED
Status: DISCONTINUED | OUTPATIENT
Start: 2017-11-03 | End: 2017-11-03 | Stop reason: HOSPADM

## 2017-11-03 RX ADMIN — METOPROLOL TARTRATE 50 MG: 50 TABLET ORAL at 21:54

## 2017-11-03 NOTE — H&P
[facility] Cardiology Services  Electrophysiology History and Physical    11/3/2017  Reason for Admission:  A FL/ SVT ablation      History of Present Illness:  Patsy Frazier is a 56 y.o. female with history of SVT and palpitations for several years and has been taking Atenolol. She was recently admitted with worsening palpitations and found to be in a wide complex tachycardia over 200 bpm, which was thought to be VT. She was given a bolus of Amiodarone and eventually converted. In the past, she had been told by her PCP that she had atrial fibrillation, but never wore a monitor, nor was A FIB documented. She denies CP, SOB, syncope when in NSR. She stopped the Metoprolol 5 days ago and reports palpitations almost daily since then with a 10 minute episode yesterday, 11/02/2017. Last dose of Xarelto was last evening. No bleeding issues. She does smoke 1/3- 1/2 PPD cigarettes. She denies ETOH or excessive caffeine.     Problem List:  1. Atrial flutter   A. History of sporadic tachypalpitations   B. Symptomatic WCT/ atrial flutter at >200 BPM, 9/28/2017  C. Echocardiogram 09/29/2017: Left ventricular systolic function is normal. Estimated EF = 55%.  The cardiac valves are anatomically and functionally normal. Estimated right ventricular systolic pressure from tricuspid regurgitation is normal (<35 mmHg).  2. Hypothyroidism  3. Asthma  4. Rheumatoid Arthritis    No Known Allergies  No current facility-administered medications on file prior to encounter.      Current Outpatient Prescriptions on File Prior to Encounter   Medication Sig Dispense Refill   • albuterol (PROVENTIL HFA;VENTOLIN HFA) 108 (90 Base) MCG/ACT inhaler Inhale 2 puffs Every 4 (Four) Hours As Needed for Wheezing.     • furosemide (LASIX) 80 MG tablet Take 1 tablet by mouth Daily. (Patient taking differently: Take 80 mg by mouth Every Morning.) 30 tablet 5   • levothyroxine (SYNTHROID, LEVOTHROID) 200 MCG tablet Take 200 mcg by  "mouth Every Morning. Total of 300mcg daily     • potassium chloride (K-DUR,KLOR-CON) 20 MEQ CR tablet Take 1 tablet by mouth Daily. (Patient taking differently: Take 20 mEq by mouth Every Morning.) 90 tablet 3   • predniSONE (DELTASONE) 5 MG tablet Take 5 mg by mouth Every Morning.     • rivaroxaban (XARELTO) 20 MG tablet Take 1 tablet by mouth Daily With Dinner. 30 tablet 6   • metoprolol tartrate (LOPRESSOR) 50 MG tablet Take 1 tablet by mouth 2 (Two) Times a Day. 60 tablet 5     Past Medical History:   Diagnosis Date   • Arthritis    • Asthma    • CHF (congestive heart failure)    • Hypothyroidism    • Stroke 1976    \"mini stroke\" at the age of 16, no deficits per pt report    • SVT (supraventricular tachycardia)     prior hx of    • Wears glasses      Past Surgical History:   Procedure Laterality Date   • CHOLECYSTECTOMY  2010   • NECK SURGERY  2007   • TUBAL ABDOMINAL LIGATION  1981     History reviewed. No pertinent family history.  Social History     Social History   • Marital status:      Spouse name: N/A   • Number of children: N/A   • Years of education: N/A     Occupational History   • Not on file.     Social History Main Topics   • Smoking status: Current Every Day Smoker     Packs/day: 0.50     Years: 40.00     Types: Cigarettes   • Smokeless tobacco: Never Used   • Alcohol use No   • Drug use: No   • Sexual activity: Defer     Other Topics Concern   • Not on file     Social History Narrative       Review of Systems:  General:  No fever, chills, fatigue, weight gain or loss  HEENT:  No headaches, loss of consciousness or vision changes   Respiratory:  No cough, productive sputum, shortness of breath or wheezing  Cardiac: See HPI  GI:  No nausea, vomiting, diarrhea, constipation, abdominal pain, history of GI bleeding, melena or hematochezia  :  No dysuria or hematuria  Musculoskeletal: + joint pain, LE edema. No back pain, neck pain.  Vascular: No claudication   Neurological:  No CVA or TIA " "symptoms  All other ROS reviewed and are negative    Physical Exam:  /63 (BP Location: Left arm, Patient Position: Lying)  Pulse 89  Temp 97.9 °F (36.6 °C) (Tympanic)   Resp 18  Ht 69\" (175.3 cm)  Wt 255 lb 8.2 oz (116 kg)  SpO2 93%  BMI 37.73 kg/m2  General:  Well developed, well-nourished in no acute distress  Skin:  No rashes  HEENT: Head is normocephalic, atraumatic, pupils equal and reactive to light and accommodation, sclerae anicteric, oropharynx is clear  Neck: No thyroid nodules, no JVD, no carotid bruits  Lungs:  Respirations unlabored, coarse to auscultation CANDI, occasional wheezes  Heart:  Regular rate and rhythm, no S3 or S4, no murmurs, gallops or rubs  Abdomen:  Soft, non-tender, non-distended. Normoactive bowel sounds.   Extremities:  No cyanosis, clubbing. 1+ CANDI LE edema. Peripheral pulses are 2+ and symmetric  Neurologic:  Awake, alert and oriented. Non focal.   Psych: Mood appropriate.     DATABASE:  No visits with results within 1 Day(s) from this visit.  Latest known visit with results is:    Appointment on 11/02/2017   Component Date Value Ref Range Status   • WBC 11/02/2017 10.13  3.50 - 10.80 10*3/mm3 Final   • RBC 11/02/2017 4.89  3.89 - 5.14 10*6/mm3 Final   • Hemoglobin 11/02/2017 15.0  11.5 - 15.5 g/dL Final   • Hematocrit 11/02/2017 47.8* 34.5 - 44.0 % Final   • MCV 11/02/2017 97.8  80.0 - 99.0 fL Final   • MCH 11/02/2017 30.7  27.0 - 31.0 pg Final   • MCHC 11/02/2017 31.4* 32.0 - 36.0 g/dL Final   • RDW 11/02/2017 14.3  11.3 - 14.5 % Final   • RDW-SD 11/02/2017 51.4  37.0 - 54.0 fl Final   • MPV 11/02/2017 10.1  6.0 - 12.0 fL Final   • Platelets 11/02/2017 314  150 - 450 10*3/mm3 Final   • Glucose 11/02/2017 96  70 - 100 mg/dL Final   • BUN 11/02/2017 21  9 - 23 mg/dL Final   • Creatinine 11/02/2017 0.70  0.60 - 1.30 mg/dL Final   • Sodium 11/02/2017 140  132 - 146 mmol/L Final   • Potassium 11/02/2017 3.7  3.5 - 5.5 mmol/L Final   • Chloride 11/02/2017 99  99 - 109 mmol/L " Final   • CO2 11/02/2017 35.0* 20.0 - 31.0 mmol/L Final   • Calcium 11/02/2017 9.5  8.7 - 10.4 mg/dL Final   • eGFR Non African Amer 11/02/2017 86  >60 mL/min/1.73 Final   • BUN/Creatinine Ratio 11/02/2017 30.0* 7.0 - 25.0 Final   • Anion Gap 11/02/2017 6.0  3.0 - 11.0 mmol/L Final   • Protime 11/02/2017 14.1* 9.6 - 11.5 Seconds Final   • INR 11/02/2017 1.28   Final   • Hemoglobin A1C 11/02/2017 6.10* 4.80 - 5.60 % Final         ASSESSMENT:   1. Atrial flutter versus SVT- likely a flutter based on 12 lead EKG. On Metoprolol until 5 days ago.   A. LDNMD2IEDd Score: 1, on Xarelto with last dose last evening  2. Hypothyroidism- on Synthroid  3. Asthma  4. Tobacco use- 1/3 PPD smoker       PLAN:  1. The risks, benefits, and alternatives of the procedure have been reviewed and the patient wishes to proceed.  2. Further recommendations pending results of ablation        This patient has been seen and evaluated by Dr. Chavo Carr and he is agreement with the above assessment and plan.     BOBBY Olmedo, MSN, ANP-C  Adult Nurse Practitioner  Cardiac Electrophysiology  [facility]        I, Chavo Carr MD, personally performed the services face to face as described and documented by the above named individual. I have made any necessary edits and it is both accurate and complete 11/3/2017  2:05 PM

## 2017-11-03 NOTE — PLAN OF CARE
Problem: Patient Care Overview (Adult)  Goal: Plan of Care Review  Outcome: Ongoing (interventions implemented as appropriate)    11/03/17 1831   Coping/Psychosocial Response Interventions   Plan Of Care Reviewed With patient   Patient Care Overview   Progress no change   Outcome Evaluation   Outcome Summary/Follow up Plan VSS, SR on monitor. Arrived to floor at 1815. No c/o pain. Sites clean, dry, intact, soft. Bedrest until 2030.

## 2017-11-03 NOTE — PLAN OF CARE
Problem: Patient Care Overview (Adult)  Goal: Plan of Care Review  Outcome: Ongoing (interventions implemented as appropriate)  PT. HERE FOR ABLATION; PROCEDURE TEACHING DONE AT BS PER BOBBY PIKE FOR DR. ACLL    11/03/17 1238   Coping/Psychosocial Response Interventions   Plan Of Care Reviewed With family;patient   Patient Care Overview   Progress no change         Problem: Arrhythmia/Dysrhythmia (Symptomatic) (Adult)  Goal: Signs and Symptoms of Listed Potential Problems Will be Absent or Manageable (Arrhythmia/Dysrhythmia)  Outcome: Outcome(s) achieved Date Met:  11/03/17  ORIENTED TO UNIT AND ROOM UPON ADMISSION TO [facility]    11/03/17 1218       11/03/17 1238   Arrhythmia/Dysrhythmia (Symptomatic)   Problems Assessed (Arrhythmia/Dysrhythmia) other (see comments)   Problems Present (Arrhythmia/Dysrhythmia) other (see comments)       Arrhythmia/Dysrhythmia (Symptomatic)   Problems Assessed (Arrhythmia/Dysrhythmia) other (see comments)   Problems Present (Arrhythmia/Dysrhythmia) other (see comments)

## 2017-11-04 VITALS
BODY MASS INDEX: 37.84 KG/M2 | HEART RATE: 77 BPM | OXYGEN SATURATION: 94 % | HEIGHT: 69 IN | RESPIRATION RATE: 18 BRPM | SYSTOLIC BLOOD PRESSURE: 97 MMHG | TEMPERATURE: 98.2 F | WEIGHT: 255.51 LBS | DIASTOLIC BLOOD PRESSURE: 49 MMHG

## 2017-11-04 PROCEDURE — 99225 PR SBSQ OBSERVATION CARE/DAY 25 MINUTES: CPT | Performed by: INTERNAL MEDICINE

## 2017-11-04 PROCEDURE — G0378 HOSPITAL OBSERVATION PER HR: HCPCS

## 2017-11-04 RX ADMIN — LEVOTHYROXINE SODIUM 100 MCG: 100 TABLET ORAL at 06:16

## 2017-11-04 RX ADMIN — LEVOTHYROXINE SODIUM 200 MCG: 100 TABLET ORAL at 06:15

## 2017-11-04 NOTE — PLAN OF CARE
Problem: Patient Care Overview (Adult)  Goal: Plan of Care Review  Outcome: Ongoing (interventions implemented as appropriate)    11/04/17 0530   Coping/Psychosocial Response Interventions   Plan Of Care Reviewed With patient   Patient Care Overview   Progress improving   Outcome Evaluation   Outcome Summary/Follow up Plan Pt asleep all HS. NSR on the monitor. Cath sites w/o complication.

## 2017-11-04 NOTE — DISCHARGE SUMMARY
[facility]  DISCHARGE SUMMARY    Date of Discharge:  11/4/2017    Discharge Diagnosis: SVT, atrial flutter    Presenting Problem/History of Present Illness  SVT (supraventricular tachycardia) [I47.1]  Atrial flutter, unspecified type [I48.92]  SVT (supraventricular tachycardia) [I47.1]    Hospital Course  Patient is a 56 y.o. female presented with a history of SVT and palpitations for several years.  There was concern for symptomatic atrial flutter with aberrancy/wide-complex tachycardia with heart rates greater than 200 bpm.  The patient underwent an EP study and right sided AFlutter ablation without complication    Procedures Performed  Procedure(s):  Ablation atrial flutter vs SVT       Consults:   Consults     No orders found for last 30 day(s).          Pertinent Test Results:   EPS and ablation:  1. No inducible sustained or nonsustained SVT or VT both on and off isoproterenol.            2.    Successful creation of a linear line of RFA along the tricuspid valve down to the inferior vena cava with isthmus block post-ablation.      Condition on Discharge:  Stable    Physical Exam at Discharge  Vital Signs  Temp:  [97.9 °F (36.6 °C)-98.5 °F (36.9 °C)] 98.2 °F (36.8 °C)  Heart Rate:  [] 77  Resp:  [10-20] 18  BP: ()/(49-83) 97/49  Physical Exam:  GEN: NAD  PULM: RRR  CV: CTAB  Venous access sites: CDI    Discharge Disposition: Home    Discharge Diet: Cardiac heart healthy diet    Activity at Discharge: As tolerated    Follow-up Appointments  No future appointments.  Additional Instructions for the Follow-ups that You Need to Schedule     Discharge Follow-up with Specified Provider    As directed    To:  Bruno   Follow Up:  3 Months                 Discharge Medications   Patsy Frazier   Home Medication Instructions RUBINA:[id number]    Printed on:11/04/17 0954   Medication Information                      albuterol (PROVENTIL HFA;VENTOLIN HFA) 108 (90  Base) MCG/ACT inhaler  Inhale 2 puffs Every 4 (Four) Hours As Needed for Wheezing.             furosemide (LASIX) 80 MG tablet  Take 1 tablet by mouth Daily.             levothyroxine (SYNTHROID, LEVOTHROID) 100 MCG tablet  Take 100 mcg by mouth Every Morning. Total of 300mcg daily              levothyroxine (SYNTHROID, LEVOTHROID) 200 MCG tablet  Take 200 mcg by mouth Every Morning. Total of 300mcg daily             metoprolol tartrate (LOPRESSOR) 50 MG tablet  Take 1 tablet by mouth 2 (Two) Times a Day.             Multiple Vitamins-Minerals (MULTIVITAMIN ADULT PO)  Take 1 tablet by mouth Daily.             naproxen sodium (ALEVE) 220 MG tablet  Take 220 mg by mouth 2 (Two) Times a Day As Needed for Mild Pain .             potassium chloride (K-DUR,KLOR-CON) 20 MEQ CR tablet  Take 1 tablet by mouth Daily.             predniSONE (DELTASONE) 5 MG tablet  Take 5 mg by mouth Every Morning.             rivaroxaban (XARELTO) 20 MG tablet  Take 1 tablet by mouth Daily With Dinner.                  Adolfo Tejeda MD  11/04/17  9:54 AM    Time: Discharge 20 min

## 2017-11-06 ENCOUNTER — TELEPHONE (OUTPATIENT)
Dept: CARDIOLOGY | Facility: CLINIC | Age: 57
End: 2017-11-06

## 2017-11-06 NOTE — TELEPHONE ENCOUNTER
Patient called and left a message. She had questions about her medication. I tried to call her back.No answer.

## 2017-11-07 DIAGNOSIS — I47.1 SVT (SUPRAVENTRICULAR TACHYCARDIA) (HCC): ICD-10-CM

## 2017-11-07 RX ORDER — METOPROLOL TARTRATE 50 MG/1
50 TABLET, FILM COATED ORAL 2 TIMES DAILY
Qty: 60 TABLET | Refills: 5 | Status: SHIPPED | OUTPATIENT
Start: 2017-11-07 | End: 2018-05-06

## 2018-04-30 RX ORDER — FUROSEMIDE 80 MG
TABLET ORAL
Qty: 30 TABLET | Refills: 0 | Status: SHIPPED | OUTPATIENT
Start: 2018-04-30

## 2018-10-22 ENCOUNTER — TRANSCRIBE ORDERS (OUTPATIENT)
Dept: MAMMOGRAPHY | Facility: HOSPITAL | Age: 58
End: 2018-10-22

## 2018-10-22 DIAGNOSIS — Z12.39 BREAST CANCER SCREENING: Primary | ICD-10-CM

## 2019-10-02 ENCOUNTER — TRANSCRIBE ORDERS (OUTPATIENT)
Dept: MAMMOGRAPHY | Facility: HOSPITAL | Age: 59
End: 2019-10-02

## 2019-10-02 ENCOUNTER — TRANSCRIBE ORDERS (OUTPATIENT)
Dept: PULMONOLOGY | Facility: HOSPITAL | Age: 59
End: 2019-10-02

## 2019-10-02 DIAGNOSIS — R06.02 SHORTNESS OF BREATH: Primary | ICD-10-CM

## 2019-10-02 DIAGNOSIS — Z12.39 BREAST CANCER SCREENING: Primary | ICD-10-CM

## 2024-05-10 ENCOUNTER — APPOINTMENT (OUTPATIENT)
Dept: GENERAL RADIOLOGY | Facility: HOSPITAL | Age: 64
DRG: 191 | End: 2024-05-10
Payer: MEDICARE

## 2024-05-10 ENCOUNTER — HOSPITAL ENCOUNTER (INPATIENT)
Facility: HOSPITAL | Age: 64
LOS: 3 days | Discharge: HOME HEALTH | DRG: 191 | End: 2024-05-13
Attending: STUDENT IN AN ORGANIZED HEALTH CARE EDUCATION/TRAINING PROGRAM | Admitting: INTERNAL MEDICINE
Payer: MEDICARE

## 2024-05-10 ENCOUNTER — APPOINTMENT (OUTPATIENT)
Dept: CARDIOLOGY | Facility: HOSPITAL | Age: 64
DRG: 191 | End: 2024-05-10
Payer: MEDICARE

## 2024-05-10 DIAGNOSIS — J43.9 PULMONARY EMPHYSEMA, UNSPECIFIED EMPHYSEMA TYPE: ICD-10-CM

## 2024-05-10 DIAGNOSIS — J96.01 ACUTE HYPOXIC RESPIRATORY FAILURE: Primary | ICD-10-CM

## 2024-05-10 DIAGNOSIS — I50.32 CHRONIC DIASTOLIC CONGESTIVE HEART FAILURE: ICD-10-CM

## 2024-05-10 DIAGNOSIS — R60.0 BILATERAL LOWER EXTREMITY EDEMA: ICD-10-CM

## 2024-05-10 PROBLEM — R09.02 HYPOXIA: Status: ACTIVE | Noted: 2024-05-10

## 2024-05-10 PROBLEM — J44.9 COPD (CHRONIC OBSTRUCTIVE PULMONARY DISEASE): Status: ACTIVE | Noted: 2024-05-10

## 2024-05-10 LAB
ALBUMIN SERPL-MCNC: 3.6 G/DL (ref 3.5–5.2)
ALBUMIN/GLOB SERPL: 1 G/DL
ALP SERPL-CCNC: 90 U/L (ref 39–117)
ALT SERPL W P-5'-P-CCNC: 20 U/L (ref 1–33)
ANION GAP SERPL CALCULATED.3IONS-SCNC: 9 MMOL/L (ref 5–15)
AST SERPL-CCNC: 12 U/L (ref 1–32)
BASOPHILS # BLD AUTO: 0.07 10*3/MM3 (ref 0–0.2)
BASOPHILS NFR BLD AUTO: 0.8 % (ref 0–1.5)
BILIRUB SERPL-MCNC: 0.4 MG/DL (ref 0–1.2)
BUN SERPL-MCNC: 21 MG/DL (ref 8–23)
BUN/CREAT SERPL: 29.2 (ref 7–25)
CALCIUM SPEC-SCNC: 9.3 MG/DL (ref 8.6–10.5)
CHLORIDE SERPL-SCNC: 101 MMOL/L (ref 98–107)
CO2 SERPL-SCNC: 30 MMOL/L (ref 22–29)
CREAT SERPL-MCNC: 0.72 MG/DL (ref 0.57–1)
CRP SERPL-MCNC: 2.27 MG/DL (ref 0–0.5)
D DIMER PPP FEU-MCNC: 0.45 MCGFEU/ML (ref 0–0.65)
DEPRECATED RDW RBC AUTO: 54.2 FL (ref 37–54)
EGFRCR SERPLBLD CKD-EPI 2021: 92.9 ML/MIN/1.73
EOSINOPHIL # BLD AUTO: 0.14 10*3/MM3 (ref 0–0.4)
EOSINOPHIL NFR BLD AUTO: 1.7 % (ref 0.3–6.2)
ERYTHROCYTE [DISTWIDTH] IN BLOOD BY AUTOMATED COUNT: 15 % (ref 12.3–15.4)
FLUAV RNA RESP QL NAA+PROBE: NOT DETECTED
FLUBV RNA RESP QL NAA+PROBE: NOT DETECTED
GEN 5 2HR TROPONIN T REFLEX: 7 NG/L
GLOBULIN UR ELPH-MCNC: 3.5 GM/DL
GLUCOSE SERPL-MCNC: 99 MG/DL (ref 65–99)
HCT VFR BLD AUTO: 49.7 % (ref 34–46.6)
HGB BLD-MCNC: 15.9 G/DL (ref 12–15.9)
IMM GRANULOCYTES # BLD AUTO: 0.03 10*3/MM3 (ref 0–0.05)
IMM GRANULOCYTES NFR BLD AUTO: 0.4 % (ref 0–0.5)
LYMPHOCYTES # BLD AUTO: 1.15 10*3/MM3 (ref 0.7–3.1)
LYMPHOCYTES NFR BLD AUTO: 13.6 % (ref 19.6–45.3)
MAGNESIUM SERPL-MCNC: 2.2 MG/DL (ref 1.6–2.4)
MCH RBC QN AUTO: 31.1 PG (ref 26.6–33)
MCHC RBC AUTO-ENTMCNC: 32 G/DL (ref 31.5–35.7)
MCV RBC AUTO: 97.3 FL (ref 79–97)
MONOCYTES # BLD AUTO: 0.65 10*3/MM3 (ref 0.1–0.9)
MONOCYTES NFR BLD AUTO: 7.7 % (ref 5–12)
NEUTROPHILS NFR BLD AUTO: 6.41 10*3/MM3 (ref 1.7–7)
NEUTROPHILS NFR BLD AUTO: 75.8 % (ref 42.7–76)
NRBC BLD AUTO-RTO: 0 /100 WBC (ref 0–0.2)
NT-PROBNP SERPL-MCNC: 137.2 PG/ML (ref 0–900)
PLATELET # BLD AUTO: 242 10*3/MM3 (ref 140–450)
PMV BLD AUTO: 9.5 FL (ref 6–12)
POTASSIUM SERPL-SCNC: 4.7 MMOL/L (ref 3.5–5.2)
PROCALCITONIN SERPL-MCNC: <0.02 NG/ML (ref 0–0.25)
PROT SERPL-MCNC: 7.1 G/DL (ref 6–8.5)
RBC # BLD AUTO: 5.11 10*6/MM3 (ref 3.77–5.28)
SARS-COV-2 RNA RESP QL NAA+PROBE: NOT DETECTED
SODIUM SERPL-SCNC: 140 MMOL/L (ref 136–145)
TROPONIN T DELTA: NORMAL
TROPONIN T SERPL HS-MCNC: <6 NG/L
WBC NRBC COR # BLD AUTO: 8.45 10*3/MM3 (ref 3.4–10.8)

## 2024-05-10 PROCEDURE — 94660 CPAP INITIATION&MGMT: CPT

## 2024-05-10 PROCEDURE — 84484 ASSAY OF TROPONIN QUANT: CPT | Performed by: STUDENT IN AN ORGANIZED HEALTH CARE EDUCATION/TRAINING PROGRAM

## 2024-05-10 PROCEDURE — 80053 COMPREHEN METABOLIC PANEL: CPT | Performed by: STUDENT IN AN ORGANIZED HEALTH CARE EDUCATION/TRAINING PROGRAM

## 2024-05-10 PROCEDURE — 25010000002 FUROSEMIDE PER 20 MG: Performed by: INTERNAL MEDICINE

## 2024-05-10 PROCEDURE — 94640 AIRWAY INHALATION TREATMENT: CPT

## 2024-05-10 PROCEDURE — 94799 UNLISTED PULMONARY SVC/PX: CPT

## 2024-05-10 PROCEDURE — 25010000002 MORPHINE PER 10 MG: Performed by: INTERNAL MEDICINE

## 2024-05-10 PROCEDURE — 71045 X-RAY EXAM CHEST 1 VIEW: CPT

## 2024-05-10 PROCEDURE — 83735 ASSAY OF MAGNESIUM: CPT | Performed by: STUDENT IN AN ORGANIZED HEALTH CARE EDUCATION/TRAINING PROGRAM

## 2024-05-10 PROCEDURE — 86140 C-REACTIVE PROTEIN: CPT | Performed by: STUDENT IN AN ORGANIZED HEALTH CARE EDUCATION/TRAINING PROGRAM

## 2024-05-10 PROCEDURE — 25010000002 METHYLPREDNISOLONE PER 40 MG: Performed by: INTERNAL MEDICINE

## 2024-05-10 PROCEDURE — 85025 COMPLETE CBC W/AUTO DIFF WBC: CPT | Performed by: STUDENT IN AN ORGANIZED HEALTH CARE EDUCATION/TRAINING PROGRAM

## 2024-05-10 PROCEDURE — 25010000002 FUROSEMIDE PER 20 MG: Performed by: STUDENT IN AN ORGANIZED HEALTH CARE EDUCATION/TRAINING PROGRAM

## 2024-05-10 PROCEDURE — 99291 CRITICAL CARE FIRST HOUR: CPT

## 2024-05-10 PROCEDURE — 99223 1ST HOSP IP/OBS HIGH 75: CPT | Performed by: INTERNAL MEDICINE

## 2024-05-10 PROCEDURE — 93306 TTE W/DOPPLER COMPLETE: CPT

## 2024-05-10 PROCEDURE — 84145 PROCALCITONIN (PCT): CPT | Performed by: STUDENT IN AN ORGANIZED HEALTH CARE EDUCATION/TRAINING PROGRAM

## 2024-05-10 PROCEDURE — 36415 COLL VENOUS BLD VENIPUNCTURE: CPT

## 2024-05-10 PROCEDURE — 93306 TTE W/DOPPLER COMPLETE: CPT | Performed by: INTERNAL MEDICINE

## 2024-05-10 PROCEDURE — 93005 ELECTROCARDIOGRAM TRACING: CPT | Performed by: STUDENT IN AN ORGANIZED HEALTH CARE EDUCATION/TRAINING PROGRAM

## 2024-05-10 PROCEDURE — 84484 ASSAY OF TROPONIN QUANT: CPT | Performed by: INTERNAL MEDICINE

## 2024-05-10 PROCEDURE — 85379 FIBRIN DEGRADATION QUANT: CPT | Performed by: STUDENT IN AN ORGANIZED HEALTH CARE EDUCATION/TRAINING PROGRAM

## 2024-05-10 PROCEDURE — 25010000002 SULFUR HEXAFLUORIDE MICROSPH 60.7-25 MG RECONSTITUTED SUSPENSION: Performed by: INTERNAL MEDICINE

## 2024-05-10 PROCEDURE — 83880 ASSAY OF NATRIURETIC PEPTIDE: CPT | Performed by: STUDENT IN AN ORGANIZED HEALTH CARE EDUCATION/TRAINING PROGRAM

## 2024-05-10 PROCEDURE — 87636 SARSCOV2 & INF A&B AMP PRB: CPT | Performed by: STUDENT IN AN ORGANIZED HEALTH CARE EDUCATION/TRAINING PROGRAM

## 2024-05-10 PROCEDURE — 25010000002 ENOXAPARIN PER 10 MG: Performed by: INTERNAL MEDICINE

## 2024-05-10 RX ORDER — POLYETHYLENE GLYCOL 3350 17 G/17G
17 POWDER, FOR SOLUTION ORAL DAILY PRN
Status: DISCONTINUED | OUTPATIENT
Start: 2024-05-10 | End: 2024-05-13 | Stop reason: HOSPADM

## 2024-05-10 RX ORDER — IPRATROPIUM BROMIDE AND ALBUTEROL SULFATE 2.5; .5 MG/3ML; MG/3ML
3 SOLUTION RESPIRATORY (INHALATION)
Status: DISCONTINUED | OUTPATIENT
Start: 2024-05-10 | End: 2024-05-10

## 2024-05-10 RX ORDER — LEVOTHYROXINE SODIUM 88 UG/1
88 TABLET ORAL DAILY
COMMUNITY

## 2024-05-10 RX ORDER — ACETAMINOPHEN 325 MG/1
650 TABLET ORAL EVERY 4 HOURS PRN
Status: DISCONTINUED | OUTPATIENT
Start: 2024-05-10 | End: 2024-05-13 | Stop reason: HOSPADM

## 2024-05-10 RX ORDER — LEVOTHYROXINE SODIUM 0.1 MG/1
200 TABLET ORAL EVERY MORNING
Status: DISCONTINUED | OUTPATIENT
Start: 2024-05-11 | End: 2024-05-13 | Stop reason: HOSPADM

## 2024-05-10 RX ORDER — BISACODYL 10 MG
10 SUPPOSITORY, RECTAL RECTAL DAILY PRN
Status: DISCONTINUED | OUTPATIENT
Start: 2024-05-10 | End: 2024-05-13 | Stop reason: HOSPADM

## 2024-05-10 RX ORDER — FUROSEMIDE 10 MG/ML
40 INJECTION INTRAMUSCULAR; INTRAVENOUS
Status: DISCONTINUED | OUTPATIENT
Start: 2024-05-10 | End: 2024-05-13 | Stop reason: HOSPADM

## 2024-05-10 RX ORDER — ONDANSETRON 2 MG/ML
4 INJECTION INTRAMUSCULAR; INTRAVENOUS EVERY 6 HOURS PRN
Status: DISCONTINUED | OUTPATIENT
Start: 2024-05-10 | End: 2024-05-13 | Stop reason: HOSPADM

## 2024-05-10 RX ORDER — ALBUTEROL SULFATE 2.5 MG/3ML
2.5 SOLUTION RESPIRATORY (INHALATION) EVERY 6 HOURS PRN
Status: DISCONTINUED | OUTPATIENT
Start: 2024-05-10 | End: 2024-05-13 | Stop reason: HOSPADM

## 2024-05-10 RX ORDER — METHYLPREDNISOLONE SODIUM SUCCINATE 40 MG/ML
40 INJECTION, POWDER, LYOPHILIZED, FOR SOLUTION INTRAMUSCULAR; INTRAVENOUS EVERY 8 HOURS
Status: DISCONTINUED | OUTPATIENT
Start: 2024-05-10 | End: 2024-05-13 | Stop reason: HOSPADM

## 2024-05-10 RX ORDER — CALCIUM CARBONATE 500 MG/1
2 TABLET, CHEWABLE ORAL 2 TIMES DAILY PRN
Status: DISCONTINUED | OUTPATIENT
Start: 2024-05-10 | End: 2024-05-13 | Stop reason: HOSPADM

## 2024-05-10 RX ORDER — SODIUM CHLORIDE 9 MG/ML
40 INJECTION, SOLUTION INTRAVENOUS AS NEEDED
Status: DISCONTINUED | OUTPATIENT
Start: 2024-05-10 | End: 2024-05-13 | Stop reason: HOSPADM

## 2024-05-10 RX ORDER — CHOLECALCIFEROL (VITAMIN D3) 125 MCG
5 CAPSULE ORAL NIGHTLY
Status: DISCONTINUED | OUTPATIENT
Start: 2024-05-10 | End: 2024-05-13 | Stop reason: HOSPADM

## 2024-05-10 RX ORDER — MULTIPLE VITAMINS W/ MINERALS TAB 9MG-400MCG
1 TAB ORAL DAILY
Status: DISCONTINUED | OUTPATIENT
Start: 2024-05-11 | End: 2024-05-13 | Stop reason: HOSPADM

## 2024-05-10 RX ORDER — NITROGLYCERIN 0.4 MG/1
0.4 TABLET SUBLINGUAL
Status: DISCONTINUED | OUTPATIENT
Start: 2024-05-10 | End: 2024-05-13 | Stop reason: HOSPADM

## 2024-05-10 RX ORDER — LEVOTHYROXINE SODIUM 88 UG/1
88 TABLET ORAL EVERY MORNING
Status: DISCONTINUED | OUTPATIENT
Start: 2024-05-11 | End: 2024-05-13 | Stop reason: HOSPADM

## 2024-05-10 RX ORDER — METOPROLOL TARTRATE 50 MG/1
50 TABLET, FILM COATED ORAL 2 TIMES DAILY
Status: DISCONTINUED | OUTPATIENT
Start: 2024-05-10 | End: 2024-05-13 | Stop reason: HOSPADM

## 2024-05-10 RX ORDER — BUDESONIDE 0.5 MG/2ML
1 INHALANT ORAL
Status: DISCONTINUED | OUTPATIENT
Start: 2024-05-10 | End: 2024-05-13 | Stop reason: HOSPADM

## 2024-05-10 RX ORDER — SODIUM CHLORIDE 0.9 % (FLUSH) 0.9 %
10 SYRINGE (ML) INJECTION EVERY 12 HOURS SCHEDULED
Status: DISCONTINUED | OUTPATIENT
Start: 2024-05-10 | End: 2024-05-13 | Stop reason: HOSPADM

## 2024-05-10 RX ORDER — ACETAMINOPHEN 500 MG
1000 TABLET ORAL ONCE
Status: COMPLETED | OUTPATIENT
Start: 2024-05-10 | End: 2024-05-10

## 2024-05-10 RX ORDER — METOLAZONE 5 MG/1
5 TABLET ORAL DAILY
Status: DISCONTINUED | OUTPATIENT
Start: 2024-05-10 | End: 2024-05-11

## 2024-05-10 RX ORDER — METOPROLOL TARTRATE 50 MG/1
50 TABLET, FILM COATED ORAL 2 TIMES DAILY
COMMUNITY

## 2024-05-10 RX ORDER — DICLOFENAC SODIUM 75 MG/1
75 TABLET, DELAYED RELEASE ORAL 2 TIMES DAILY
COMMUNITY
End: 2024-05-13 | Stop reason: HOSPADM

## 2024-05-10 RX ORDER — UMECLIDINIUM BROMIDE AND VILANTEROL TRIFENATATE 62.5; 25 UG/1; UG/1
1 POWDER RESPIRATORY (INHALATION)
Status: ON HOLD | COMMUNITY
End: 2024-05-13

## 2024-05-10 RX ORDER — CETIRIZINE HYDROCHLORIDE 10 MG/1
10 TABLET ORAL DAILY
Status: DISCONTINUED | OUTPATIENT
Start: 2024-05-10 | End: 2024-05-13 | Stop reason: HOSPADM

## 2024-05-10 RX ORDER — MORPHINE SULFATE 2 MG/ML
2 INJECTION, SOLUTION INTRAMUSCULAR; INTRAVENOUS
Status: DISCONTINUED | OUTPATIENT
Start: 2024-05-10 | End: 2024-05-13 | Stop reason: HOSPADM

## 2024-05-10 RX ORDER — FLUTICASONE PROPIONATE 50 MCG
2 SPRAY, SUSPENSION (ML) NASAL DAILY
Status: DISCONTINUED | OUTPATIENT
Start: 2024-05-10 | End: 2024-05-13 | Stop reason: HOSPADM

## 2024-05-10 RX ORDER — CETIRIZINE HYDROCHLORIDE 10 MG/1
10 TABLET ORAL DAILY
COMMUNITY

## 2024-05-10 RX ORDER — ENOXAPARIN SODIUM 100 MG/ML
40 INJECTION SUBCUTANEOUS EVERY 12 HOURS SCHEDULED
Status: DISCONTINUED | OUTPATIENT
Start: 2024-05-10 | End: 2024-05-11

## 2024-05-10 RX ORDER — ONDANSETRON 4 MG/1
4 TABLET, ORALLY DISINTEGRATING ORAL EVERY 6 HOURS PRN
Status: DISCONTINUED | OUTPATIENT
Start: 2024-05-10 | End: 2024-05-13 | Stop reason: HOSPADM

## 2024-05-10 RX ORDER — FUROSEMIDE 10 MG/ML
80 INJECTION INTRAMUSCULAR; INTRAVENOUS ONCE
Status: COMPLETED | OUTPATIENT
Start: 2024-05-10 | End: 2024-05-10

## 2024-05-10 RX ORDER — SODIUM CHLORIDE 0.9 % (FLUSH) 0.9 %
10 SYRINGE (ML) INJECTION AS NEEDED
Status: DISCONTINUED | OUTPATIENT
Start: 2024-05-10 | End: 2024-05-13 | Stop reason: HOSPADM

## 2024-05-10 RX ORDER — FLUTICASONE PROPIONATE 50 MCG
2 SPRAY, SUSPENSION (ML) NASAL DAILY
COMMUNITY

## 2024-05-10 RX ORDER — IPRATROPIUM BROMIDE AND ALBUTEROL SULFATE 2.5; .5 MG/3ML; MG/3ML
3 SOLUTION RESPIRATORY (INHALATION)
Status: DISCONTINUED | OUTPATIENT
Start: 2024-05-10 | End: 2024-05-13 | Stop reason: HOSPADM

## 2024-05-10 RX ORDER — AMOXICILLIN 250 MG
2 CAPSULE ORAL 2 TIMES DAILY PRN
Status: DISCONTINUED | OUTPATIENT
Start: 2024-05-10 | End: 2024-05-13 | Stop reason: HOSPADM

## 2024-05-10 RX ORDER — BISACODYL 5 MG/1
5 TABLET, DELAYED RELEASE ORAL DAILY PRN
Status: DISCONTINUED | OUTPATIENT
Start: 2024-05-10 | End: 2024-05-13 | Stop reason: HOSPADM

## 2024-05-10 RX ADMIN — ACETAMINOPHEN 1000 MG: 500 TABLET ORAL at 14:37

## 2024-05-10 RX ADMIN — IPRATROPIUM BROMIDE AND ALBUTEROL SULFATE 3 ML: .5; 3 SOLUTION RESPIRATORY (INHALATION) at 18:52

## 2024-05-10 RX ADMIN — MORPHINE SULFATE 2 MG: 2 INJECTION, SOLUTION INTRAMUSCULAR; INTRAVENOUS at 17:43

## 2024-05-10 RX ADMIN — FUROSEMIDE 80 MG: 10 INJECTION, SOLUTION INTRAMUSCULAR; INTRAVENOUS at 14:10

## 2024-05-10 RX ADMIN — METOPROLOL TARTRATE 50 MG: 50 TABLET, FILM COATED ORAL at 20:42

## 2024-05-10 RX ADMIN — FLUTICASONE PROPIONATE 2 SPRAY: 50 SPRAY, METERED NASAL at 17:43

## 2024-05-10 RX ADMIN — FUROSEMIDE 40 MG: 10 INJECTION, SOLUTION INTRAMUSCULAR; INTRAVENOUS at 17:43

## 2024-05-10 RX ADMIN — SULFUR HEXAFLUORIDE 2 ML: KIT at 16:54

## 2024-05-10 RX ADMIN — BUDESONIDE 1 MG: 0.5 INHALANT RESPIRATORY (INHALATION) at 18:52

## 2024-05-10 RX ADMIN — CETIRIZINE HYDROCHLORIDE 10 MG: 10 TABLET, FILM COATED ORAL at 17:43

## 2024-05-10 RX ADMIN — MORPHINE SULFATE 2 MG: 2 INJECTION, SOLUTION INTRAMUSCULAR; INTRAVENOUS at 20:42

## 2024-05-10 RX ADMIN — METHYLPREDNISOLONE SODIUM SUCCINATE 40 MG: 40 INJECTION, POWDER, FOR SOLUTION INTRAMUSCULAR; INTRAVENOUS at 17:43

## 2024-05-10 RX ADMIN — Medication 10 ML: at 20:42

## 2024-05-10 RX ADMIN — ENOXAPARIN SODIUM 40 MG: 100 INJECTION SUBCUTANEOUS at 20:42

## 2024-05-10 RX ADMIN — Medication 5 MG: at 20:42

## 2024-05-10 NOTE — H&P
[facility]   [facility]      Name:  Patsy Frazier   Age:  63 y.o.  Sex:  female  :  1960  MRN:  [id number]   Visit Number:  [id number]  Admission Date:  5/10/2024  Date Of Service:  05/10/24  Primary Care Physician:  Eva Washburn APRN    Chief Complaint:     hypoxia    History Of Presenting Illness:      63 female,  Doesn't wear oxygen. Tobacco use persistent. COPD. CHF documented but last echo normal. Aflutter s/p Ablation. History of LE edema since age 18. Reports multiple family members with same problem. Went to  Appointment in [place]. Oxygen levels 69% in the clinic. Didn't know she was short of breath until she got there. Reports leg swelling same as it always is. Has not been taking lasix because it makes her feel bad. Sleeps sitting upHasn't took it in months. Full code.     Pertinent findings: bnp 137, trop 6, cr 0.72, covid and flu ok, CXR clear, EKG NSR nonspecific Twave changes    ED Medications:    Medications   furosemide (LASIX) injection 80 mg (80 mg Intravenous Given 5/10/24 1410)   acetaminophen (TYLENOL) tablet 1,000 mg (1,000 mg Oral Given 5/10/24 1437)       Edited by: Bharath Segal DO at 5/10/2024 1605     Review Of Systems:    All systems were reviewed and negative except as mentioned in history of presenting illness, assessment and plan.    Past Medical History: Patient  has a past medical history of Arthritis, Asthma, CHF (congestive heart failure), COPD (chronic obstructive pulmonary disease), Hypothyroidism, Stroke (), SVT (supraventricular tachycardia), and Wears glasses.    Past Surgical History: Patient  has a past surgical history that includes Tubal ligation (); Neck surgery (); Cholecystectomy (); and Cardiac electrophysiology procedure (N/A, 11/3/2017).    Social History: Patient  reports that she has been smoking cigarettes. She has a 20 pack-year smoking history. She has never used  smokeless tobacco. She reports that she does not drink alcohol and does not use drugs.    Family History:  Patient's family history has been reviewed and found to be noncontributory.     Allergies:      Patient has no known allergies.    Home Medications:    Prior to Admission Medications       Prescriptions Last Dose Informant Patient Reported? Taking?    albuterol (PROVENTIL HFA;VENTOLIN HFA) 108 (90 Base) MCG/ACT inhaler  Self Yes No    Inhale 2 puffs Every 4 (Four) Hours As Needed for Wheezing.    cetirizine (zyrTEC) 10 MG tablet   Yes No    Take 1 tablet by mouth Daily.    diclofenac (VOLTAREN) 75 MG EC tablet   Yes No    Take 1 tablet by mouth 2 (Two) Times a Day.    fluticasone (FLONASE) 50 MCG/ACT nasal spray   Yes No    2 sprays into the nostril(s) as directed by provider Daily.    furosemide (LASIX) 80 MG tablet   No No    take 1 tablet by mouth once daily    levothyroxine (SYNTHROID, LEVOTHROID) 100 MCG tablet  Self Yes No    Take 2 tablets by mouth Daily.    levothyroxine (SYNTHROID, LEVOTHROID) 200 MCG tablet  Self Yes No    Take 1 tablet by mouth Every Morning.    levothyroxine (SYNTHROID, LEVOTHROID) 88 MCG tablet   Yes No    Take 1 tablet by mouth Daily. Take with the 200mg tablet for a total of 288mg per day    metoprolol tartrate (LOPRESSOR) 50 MG tablet   Yes No    Take 1 tablet by mouth 2 (Two) Times a Day.    Multiple Vitamins-Minerals (MULTIVITAMIN ADULT PO)  Self Yes No    Take 1 tablet by mouth Daily.    naproxen sodium (ALEVE) 220 MG tablet  Self Yes No    Take 220 mg by mouth 2 (Two) Times a Day As Needed for Mild Pain .    potassium chloride (K-DUR,KLOR-CON) 20 MEQ CR tablet  Self No No    Take 1 tablet by mouth Daily.    Patient taking differently:  Take 0.5 tablets by mouth Every Morning.    predniSONE (DELTASONE) 5 MG tablet  Self Yes No    Take 1 tablet by mouth Every Morning.    rivaroxaban (XARELTO) 20 MG tablet   No No    Take 1 tablet by mouth Daily With Dinner.     "Umeclidinium-Vilanterol (Anoro Ellipta) 62.5-25 MCG/ACT aerosol powder  inhaler   Yes No    Inhale 1 puff Daily.              Vital Signs:  Temp:  [97.3 °F (36.3 °C)-98 °F (36.7 °C)] 97.3 °F (36.3 °C)  Heart Rate:  [72-89] 84  Resp:  [18] 18  BP: (127-165)/(57-92) 165/57        05/10/24  1251 05/10/24  1511   Weight: (!) 143 kg (315 lb) (!) 140 kg (307 lb 12.2 oz)     Body mass index is 46.8 kg/m².    Physical Exam:     Most recent vital Signs: /57 (BP Location: Right arm, Patient Position: Lying)   Pulse 84   Temp 97.3 °F (36.3 °C) (Oral)   Resp 18   Ht 172.7 cm (68\")   Wt (!) 140 kg (307 lb 12.2 oz)   SpO2 96%   BMI 46.80 kg/m²     Constitutional: Awake, alert  Eyes: PERRLA, sclerae anicteric, no conjunctival injection  HENT: NCAT, mucous membranes moist  Neck: Supple, no thyromegaly, no lymphadenopathy, trachea midline  Respiratory: diminimished with basilar rales bilaterally, nonlabored respirations   Cardiovascular: RRR, no murmurs, rubs, or gallops, palpable pedal pulses bilaterally  Gastrointestinal: Positive bowel sounds, soft, nontender, nondistended  Musculoskeletal: 3+ bilateral ankle edema, no clubbing or cyanosis to extremities  Psychiatric: Appropriate affect, cooperative  Neurologic: Oriented x 3, speech clear  Skin: weeping reddish skin on bilateral lower extremities  Edited by: Bharath Segal DO at 5/10/2024 6630      Laboratory data:    I have reviewed the labs done in the emergency room.    Results from last 7 days   Lab Units 05/10/24  1258   SODIUM mmol/L 140   POTASSIUM mmol/L 4.7   CHLORIDE mmol/L 101   CO2 mmol/L 30.0*   BUN mg/dL 21   CREATININE mg/dL 0.72   CALCIUM mg/dL 9.3   BILIRUBIN mg/dL 0.4   ALK PHOS U/L 90   ALT (SGPT) U/L 20   AST (SGOT) U/L 12   GLUCOSE mg/dL 99     Results from last 7 days   Lab Units 05/10/24  1258   WBC 10*3/mm3 8.45   HEMOGLOBIN g/dL 15.9   HEMATOCRIT % 49.7*   PLATELETS 10*3/mm3 242         Results from last 7 days   Lab Units " "05/10/24  1258   HSTROP T ng/L <6     Results from last 7 days   Lab Units 05/10/24  1258   PROBNP pg/mL 137.2                       Invalid input(s): \"USDES\", \"NITRITITE\", \"BACT\", \"EP\"    Pain Management Panel           No data to display                EKG:      NSR no ST or T wave changes    Radiology:    XR Chest 1 View    Result Date: 5/10/2024  PROCEDURE: XR CHEST 1 VW-  HISTORY: SOA  COMPARISON: 10/5/2017.  FINDINGS: The heart is normal in size. The mediastinum is unremarkable. The lungs are clear. There is no pneumothorax.  There are no acute osseous abnormalities.      No acute cardiopulmonary process.  Continued followup is recommended.     Images were reviewed, interpreted, and dictated by Dr. Malik Rodriguez MD Transcribed by Bernabe Matos PA-C.  This report was signed and finalized on 5/10/2024 1:37 PM by Malik Rodriguez MD.       Assessment/Plan:      Hypoxia    Tobacco abuse    COPD (chronic obstructive pulmonary disease)    Bilateral lower extremity edema    -admit telemetry. Unclear cause of shortness of air. Suspect combination of CHF and COPD.  Will give lasix. Check echo. Will need PFTs outpatient. Nebulizer treatments. Wound care. PT/OT. Will give pain medication   Edited by: Bharath Segal DO at 5/10/2024 1605           Risk Assessment: moderate  DVT Prophylaxis: lovenox  Code Status:   Code Status and Medical Interventions:   Ordered at: 05/10/24 1613     Code Status (Patient has no pulse and is not breathing):    CPR (Attempt to Resuscitate)     Medical Interventions (Patient has pulse or is breathing):    Full Support      Diet:   Dietary Orders (From admission, onward)       Start     Ordered    05/10/24 1611  Diet: Cardiac; Healthy Heart (2-3 Na+); Fluid Consistency: Thin (IDDSI 0)  Diet Effective Now        References:    Diet Order [place]   Question Answer Comment   Diets: Cardiac    Cardiac Diet: Healthy Heart (2-3 Na+)    Fluid Consistency: Thin (IDDSI 0)        05/10/24 1613      "                Advance Care Planning   ACP discussion was held with the patient during this visit. Patient does not have an advance directive, declines further assistance.           Bharath Segal DO  05/10/24  16:14 EDT    Dictated utilizing Dragon dictation.

## 2024-05-10 NOTE — CASE MANAGEMENT/SOCIAL WORK
Discharge Planning Assessment   [place]onal     Patient Name: Patsy Frazier  MRN: [id number]  Today's Date: 5/10/2024    Admit Date: 5/10/2024    Plan: The patient is awake and able to answer questions.  She is a current patient of Eva Washburn and gets her medications at [facility].  She elects to enroll in Meds to Bed.  She does not use DME at home.  She denies the need for DME at [place].  She requests a home health referral.  Patient was provided with home health options.  SHe will update CM with her choice of agency at a later time.  The patient smokes, and was accepting of resources for smoking cessation.  At the time of ID the patient plans to return home with home health for nursing, PT, and OT.  Questions and concerns were addressed at the time of this conversation.  IMM delivered at earlier time.  Will provide additional resources and information upon patient request.   Discharge Needs Assessment       Row Name 05/10/24 1450       Living Environment    People in Home alone    Current Living Arrangements apartment    Duration at Residence 4 years; patient is trying to get moved to a lower level apartment    Potentially Unsafe Housing Conditions none    In the past 12 months has the electric, gas, oil, or water company threatened to shut off services in your home? No    Primary Care Provided by self    Provides Primary Care For no one, unable/limited ability to care for self    Family Caregiver if Needed none    Quality of Family Relationships helpful;involved;supportive    Able to Return to Prior Arrangements yes       Resource/Environmental Concerns    Resource/Environmental Concerns none    Transportation Concerns none       Transportation Needs    In the past 12 months, has lack of transportation kept you from medical appointments or from getting medications? no    In the past 12 months, has lack of transportation kept you from meetings, work, or from getting things needed for daily living? No        Food Insecurity    Within the past 12 months, you worried that your food would run out before you got the money to buy more. Never true    Within the past 12 months, the food you bought just didn't last and you didn't have money to get more. Never true       Transition Planning    Patient/Family Anticipates Transition to home with help/services    Patient/Family Anticipated Services at Transition none       Discharge Needs Assessment    Readmission Within the Last 30 Days no previous admission in last 30 days    Equipment Currently Used at Home none    Concerns to be Addressed discharge planning    Anticipated Changes Related to Illness inability to care for self    Equipment Needed After Discharge none    Outpatient/Agency/Support Group Needs homecare agency    Discharge Facility/Level of Care Needs home with home health    Provided Post Acute Provider List? Yes    Post Acute Provider List Home Health    Provided Post Acute Provider Quality & Resource List? Yes    Post Acute Provider Quality and Resource List Home Health    Delivered To Patient    Method of Delivery In person    Patient's Choice of Community Agency(s) Patient will update CM with HH agency choice at later time    Current Discharge Risk lives alone                   Discharge Plan       Row Name 05/10/24 1673       Plan    Plan The patient is awake and able to answer questions.  She is a current patient of Eva Washburn and gets her medications at [facility].  She elects to enroll in Meds to Bed.  She does not use DME at home.  She denies the need for DME at [place].  She requests a home health referral.  Patient was provided with home health options.  SHe will update CM with her choice of agency at a later time.  The patient smokes, and was accepting of resources for smoking cessation.  At the time of [place] the patient plans to return home with home health for nursing, PT, and OT.  Questions and concerns were addressed at the time of this  conversation.  IMM delivered at earlier time.  Will provide additional resources and information upon patient request.    Patient/Family in Agreement with Plan yes    Provided Post Acute Provider List? Yes    Post Acute Provider List Home Health    Provided Post Acute Provider Quality & Resource List? Yes    Post Acute Provider Quality and Resource List Home Health    Delivered To Patient    Method of Delivery In person    Plan Comments Patient will update CM with  agency choice at later time    Final Discharge Disposition Code 06 - home with home health care    Final Note Plans to DC home with home health                  Continued Care and Services - Admitted Since 5/10/2024    No active coordination exists for this encounter.          Demographic Summary       Row Name 05/10/24 1449       General Information    Admission Type inpatient    Arrived From emergency department    Required Notices Provided Important Message from Medicare    Referral Source admission list    Reason for Consult discharge planning    Preferred Language English       Contact Information    Permission Granted to Share Info With                    Functional Status       Row Name 05/10/24 1449       Functional Status    Usual Activity Tolerance good    Current Activity Tolerance fair       Physical Activity    On average, how many days per week do you engage in moderate to strenuous exercise (like a brisk walk)? 0 days    On average, how many minutes do you engage in exercise at this level? 0 min    Number of minutes of exercise per week 0       Assessment of Health Literacy    How often do you have someone help you read hospital materials? Never    How often do you have problems learning about your medical condition because of difficulty understanding written information? Never    How often do you have a problem understanding what is told to you about your medical condition? Never    How confident are you filling out medical  forms by yourself? Extremely    Health Literacy Excellent       Functional Status, IADL    Medications independent    Meal Preparation independent    Housekeeping independent    Laundry independent    Shopping assistive person       Mental Status    General Appearance WDL WDL       Mental Status Summary    Recent Changes in Mental Status/Cognitive Functioning no changes                   Psychosocial       Row Name 05/10/24 1450       Values/Beliefs    Spiritual, Cultural Beliefs, [employer]al Practices, Values that Affect Care no       Behavior WDL    Behavior WDL WDL       Emotion Mood WDL    Emotion/Mood/Affect WDL WDL       Speech WDL    Speech WDL WDL       Perceptual State WDL    Perceptual State WDL WDL       Thought Process WDL    Thought Process WDL WDL       Intellectual Performance WDL    Intellectual Performance WDL WDL                   Abuse/Neglect       Row Name 05/10/24 1450       Personal Safety    Feels Unsafe at Home or Work/School no    Feels Threatened by Someone no    Does Anyone Try to Keep You From Having Contact with Others or Doing Things Outside Your Home? no    Physical Signs of Abuse Present no                   Legal       Row Name 05/10/24 1450       Financial Resource Strain    How hard is it for you to pay for the very basics like food, housing, medical care, and heating? Not hard                   Substance Abuse       Row Name 05/10/24 1450       Substance Use    Substance Use Status current tobacco use    Last Tobacco Use Date 05/10/24  Patient accepted resources for smoking cessation                   Patient Forms       Row Name 05/10/24 1455       Patient Forms    Important Message from Medicare (IMM) Delivered    Delivered to Patient    Method of delivery In person                      Nancy Agrawal RN

## 2024-05-10 NOTE — ED PROVIDER NOTES
Subjective:  History of Present Illness:    Patient is a 63-year-old female history of CHF, asthma, hypothyroidism, CVA presents today with increasing pedal edema, shortness of breath.  Reportedly came acutely hypoxic at her doctor's office today after presenting complaining of dyspnea.  2+ pitting edema bilateral, states this is much worse than her baseline.  Per EMS report, patient noncompliant with Lasix therapy at home.  Ultimately required BiPAP and route due to persistent hypoxia.  Now presents to our emergency department.  Denies any fevers.  No chest pain.  Denies any focal weakness.  No abdominal pain nausea vomiting or diarrhea.  Denies dysuria.  Stable on BiPAP.      Nurses Notes reviewed and agree, including vitals, allergies, social history and prior medical history.     REVIEW OF SYSTEMS: All systems reviewed and not pertinent unless noted.  Review of Systems   Constitutional:  Positive for activity change. Negative for appetite change, chills, fatigue and fever.   HENT:  Negative for rhinorrhea, sinus pressure and sinus pain.    Eyes:  Negative for discharge and itching.   Respiratory:  Positive for cough, shortness of breath and wheezing.    Cardiovascular:  Positive for leg swelling. Negative for chest pain and palpitations.   Gastrointestinal:  Negative for abdominal distention, abdominal pain, nausea and vomiting.   Endocrine: Negative for cold intolerance and heat intolerance.   Genitourinary:  Negative for decreased urine volume, difficulty urinating, flank pain, frequency, urgency, vaginal bleeding, vaginal discharge and vaginal pain.   Musculoskeletal:  Negative for gait problem, neck pain and neck stiffness.   Skin:  Negative for color change.   Allergic/Immunologic: Negative for environmental allergies.   Neurological:  Negative for seizures, syncope, facial asymmetry and speech difficulty.   Psychiatric/Behavioral:  Negative for self-injury and suicidal ideas.        Past Medical History:  "  Diagnosis Date    Arthritis     Asthma     CHF (congestive heart failure)     COPD (chronic obstructive pulmonary disease)     Hypothyroidism     Stroke 1976    \"mini stroke\" at the age of 16, no deficits per pt report     SVT (supraventricular tachycardia)     prior hx of     Wears glasses        Allergies:    Patient has no known allergies.      Past Surgical History:   Procedure Laterality Date    CARDIAC ELECTROPHYSIOLOGY PROCEDURE N/A 11/3/2017    Procedure: Ablation atrial flutter vs SVT;  Surgeon: Chavo Carr MD;  Location: [facility] INVASIVE LOCATION;  Service:     CHOLECYSTECTOMY  2010    NECK SURGERY  2007    TUBAL ABDOMINAL LIGATION  1981         Social History     Socioeconomic History    Marital status:    Tobacco Use    Smoking status: Every Day     Current packs/day: 0.50     Average packs/day: 0.5 packs/day for 40.0 years (20.0 ttl pk-yrs)     Types: Cigarettes    Smokeless tobacco: Never   Substance and Sexual Activity    Alcohol use: No    Drug use: No    Sexual activity: Defer         History reviewed. No pertinent family history.    Objective  Physical Exam:  /62   Pulse 80   Temp 98 °F (36.7 °C) (Oral)   Resp 18   Ht 172.7 cm (68\")   Wt (!) 143 kg (315 lb)   SpO2 94%   BMI 47.90 kg/m²      Physical Exam  Constitutional:       General: She is in acute distress.      Appearance: Normal appearance. She is obese. She is ill-appearing. She is not toxic-appearing.   HENT:      Head: Normocephalic and atraumatic.      Nose: Nose normal. No congestion or rhinorrhea.      Mouth/Throat:      Mouth: Mucous membranes are dry.      Pharynx: Oropharynx is clear. No oropharyngeal exudate or posterior oropharyngeal erythema.   Eyes:      Extraocular Movements: Extraocular movements intact.      Conjunctiva/sclera: Conjunctivae normal.      Pupils: Pupils are equal, round, and reactive to light.   Cardiovascular:      Rate and Rhythm: Normal rate and regular rhythm.      Pulses: " Normal pulses.      Heart sounds: Normal heart sounds.   Pulmonary:      Effort: Pulmonary effort is normal. No respiratory distress.      Breath sounds: Normal breath sounds.   Abdominal:      General: Abdomen is flat. Bowel sounds are normal. There is no distension.      Palpations: Abdomen is soft.      Tenderness: There is no abdominal tenderness. There is no guarding or rebound.   Musculoskeletal:         General: No swelling or tenderness. Normal range of motion.      Cervical back: Normal range of motion and neck supple.      Right lower leg: Edema present.      Left lower leg: Edema present.      Comments: Sniffing and 2+ pedal edema bilateral with overlying erythema bilateral appears consistent with chronic venous stasis   Skin:     General: Skin is warm and dry.      Capillary Refill: Capillary refill takes less than 2 seconds.   Neurological:      General: No focal deficit present.      Mental Status: She is alert and oriented to person, place, and time. Mental status is at baseline.      Cranial Nerves: No cranial nerve deficit.      Sensory: No sensory deficit.      Motor: No weakness.      Coordination: Coordination normal.   Psychiatric:         Mood and Affect: Mood normal.         Behavior: Behavior normal.         Thought Content: Thought content normal.         Judgment: Judgment normal.         Critical Care    Performed by: Luis Eduardo Rojo MD  Authorized by: Luis Eduardo Rojo MD    Critical care provider statement:     Critical care time (minutes):  30    Critical care was necessary to treat or prevent imminent or life-threatening deterioration of the following conditions:  Respiratory failure    Critical care was time spent personally by me on the following activities:  Blood draw for specimens, development of treatment plan with patient or surrogate, evaluation of patient's response to treatment, discussions with primary provider, examination of patient, obtaining history from patient  or surrogate, ordering and performing treatments and interventions, ordering and review of laboratory studies, ordering and review of radiographic studies, pulse oximetry, re-evaluation of patient's condition and review of old charts    Care discussed with: admitting provider        ED Course:    ED Course as of 05/10/24 1433   Fri May 10, 2024   1428 EKG interpreted by me, normal sinus rhythm with no concerning ST changes noted, rate of 67 [JE]      ED Course User Index  [JE] Luis Eduardo Rojo MD       Lab Results (last 24 hours)       Procedure Component Value Units Date/Time    CBC & Differential [[id number]]  (Abnormal) Collected: 05/10/24 1258    Specimen: Blood Updated: 05/10/24 1315    Narrative:      The following orders were created for panel order CBC & Differential.  Procedure                               Abnormality         Status                     ---------                               -----------         ------                     CBC Auto Differential[[id number]]        Abnormal            Final result                 Please view results for these tests on the individual orders.    Comprehensive Metabolic Panel [[id number]]  (Abnormal) Collected: 05/10/24 1258    Specimen: Blood Updated: 05/10/24 1403     Glucose 99 mg/dL      BUN 21 mg/dL      Creatinine 0.72 mg/dL      Sodium 140 mmol/L      Potassium 4.7 mmol/L      Comment: Slight hemolysis detected by analyzer. Result may be falsely elevated.        Chloride 101 mmol/L      CO2 30.0 mmol/L      Calcium 9.3 mg/dL      Total Protein 7.1 g/dL      Albumin 3.6 g/dL      ALT (SGPT) 20 U/L      AST (SGOT) 12 U/L      Alkaline Phosphatase 90 U/L      Total Bilirubin 0.4 mg/dL      Globulin 3.5 gm/dL      A/G Ratio 1.0 g/dL      BUN/Creatinine Ratio 29.2     Anion Gap 9.0 mmol/L      eGFR 92.9 mL/min/1.73     Narrative:      GFR Normal >60  Chronic Kidney Disease <60  Kidney Failure <15      BNP [[id number]]  (Normal) Collected: 05/10/24 1258     Specimen: Blood Updated: 05/10/24 1334     proBNP 137.2 pg/mL     Narrative:      This assay is used as an aid in the diagnosis of individuals suspected of having heart failure. It can be used as an aid in the diagnosis of acute decompensated heart failure (ADHF) in patients presenting with signs and symptoms of ADHF to the emergency department (ED). In addition, NT-proBNP of <300 pg/mL indicates ADHF is not likely.    Age Range Result Interpretation  NT-proBNP Concentration (pg/mL:      <50             Positive            >450                   Gray                 300-450                    Negative             <300    50-75           Positive            >900                  Gray                300-900                  Negative            <300      >75             Positive            >1800                  Gray                300-1800                  Negative            <300    High Sensitivity Troponin T [[id number]]  (Normal) Collected: 05/10/24 1258    Specimen: Blood Updated: 05/10/24 1334     HS Troponin T <6 ng/L     Narrative:      High Sensitive Troponin T Reference Range:  <14.0 ng/L- Negative Female for AMI  <22.0 ng/L- Negative Male for AMI  >=14 - Abnormal Female indicating possible myocardial injury.  >=22 - Abnormal Male indicating possible myocardial injury.   Clinicians would have to utilize clinical acumen, EKG, Troponin, and serial changes to determine if it is an Acute Myocardial Infarction or myocardial injury due to an underlying chronic condition.         D-dimer, Quantitative [[id number]]  (Normal) Collected: 05/10/24 1258    Specimen: Blood Updated: 05/10/24 1322     D-Dimer, Quantitative 0.45 MCGFEU/mL     Narrative:      According to the assay 's published package insert, a normal (<0.50 MCGFEU/mL) D-dimer result in conjunction with a non-high clinical probability assessment, excludes deep vein thrombosis (DVT) and pulmonary embolism (PE) with high sensitivity.    D-dimer  "values increase with age and this can make VTE exclusion of an older population difficult. To address this, the [employer], based on best available evidence and recent guidelines, recommends that clinicians use age-adjusted D-dimer thresholds in patients greater than 50 years of age with: a) a low probability of PE who do not meet all Pulmonary Embolism Rule Out Criteria, or b) in those with intermediate probability of PE.   The formula for an age-adjusted D-dimer cut-off is \"age/100\".  For example, a 60 year old patient would have an age-adjusted cut-off of 0.60 MCGFEU/mL and an 80 year old 0.80 MCGFEU/mL.    C-reactive Protein [[id number]]  (Abnormal) Collected: 05/10/24 1258    Specimen: Blood Updated: 05/10/24 1331     C-Reactive Protein 2.27 mg/dL     Magnesium [[id number]]  (Normal) Collected: 05/10/24 1258    Specimen: Blood Updated: 05/10/24 1332     Magnesium 2.2 mg/dL     CBC Auto Differential [[id number]]  (Abnormal) Collected: 05/10/24 1258    Specimen: Blood Updated: 05/10/24 1315     WBC 8.45 10*3/mm3      RBC 5.11 10*6/mm3      Hemoglobin 15.9 g/dL      Hematocrit 49.7 %      MCV 97.3 fL      MCH 31.1 pg      MCHC 32.0 g/dL      RDW 15.0 %      RDW-SD 54.2 fl      MPV 9.5 fL      Platelets 242 10*3/mm3      Neutrophil % 75.8 %      Lymphocyte % 13.6 %      Monocyte % 7.7 %      Eosinophil % 1.7 %      Basophil % 0.8 %      Immature Grans % 0.4 %      Neutrophils, Absolute 6.41 10*3/mm3      Lymphocytes, Absolute 1.15 10*3/mm3      Monocytes, Absolute 0.65 10*3/mm3      Eosinophils, Absolute 0.14 10*3/mm3      Basophils, Absolute 0.07 10*3/mm3      Immature Grans, Absolute 0.03 10*3/mm3      nRBC 0.0 /100 WBC     COVID-19 and FLU A/B PCR, 1 HR TAT - Swab, Nasopharynx [[id number]]  (Normal) Collected: 05/10/24 1304    Specimen: Swab from Nasopharynx Updated: 05/10/24 1327     COVID19 Not Detected     Influenza A PCR Not Detected     Influenza B PCR Not Detected    Narrative:      Fact " sheet for providers: https://www.fda.gov/media/573393/download    Fact sheet for patients: https://www.fda.gov/media/368855/download    Test performed by PCR.             XR Chest 1 View    Result Date: 5/10/2024  PROCEDURE: XR CHEST 1 VW-  HISTORY: SOA  COMPARISON: 10/5/2017.  FINDINGS: The heart is normal in size. The mediastinum is unremarkable. The lungs are clear. There is no pneumothorax.  There are no acute osseous abnormalities.      Impression: No acute cardiopulmonary process.  Continued followup is recommended.     Images were reviewed, interpreted, and dictated by Dr. Malik Rodriguez MD Transcribed by Bernabe Matos PA-C.  This report was signed and finalized on 5/10/2024 1:37 PM by Malik Rodriguez MD.          MDM     Amount and/or Complexity of Data Reviewed  Decide to obtain previous medical records or to obtain history from someone other than the patient: yes        Initial impression of presenting illness: Shortness of breath    DDX: includes but is not limited to: CHF exacerbation, COPD exacerbation, PE, bacterial pneumonia, viral URI    Patient arrives guarded with vitals interpreted by myself.     Pertinent features from physical exam: 2+ pitting edema bilateral, wheezing on exam, nontender to abdominal palpation, tachycardic with regular rhythm.    Initial diagnostic plan: CBC, CMP, troponin, BNP, CRP, Pro-Ritesh, lactic acid, magnesium, EKG, chest x-ray    Results from initial plan were reviewed and interpreted by me revealing no concern for bacterial pneumonia, no concern for PE.  BNP is not elevated however, given patient's physical exam feels that this is falsely decreased    Diagnostic information from other sources: Reviewed past medical records and discussed with EMS on arrival    Interventions / Re-evaluation: Given my concern for fluid overload given 80 mg IV Lasix, patient remained stable on 3 L nasal cannula in the emergency department, able to be weaned off BiPAP    Results/clinical  rationale were discussed with patient at bedside    Consultations/Discussion of results with other physicians: Given my concerns for acute hypoxic respiratory failure secondary to CHF exacerbation discussed with hospitalist, Dr. Segal, and admitted to their service for further management    Disposition plan: Admit  -----        Final diagnoses:   Acute hypoxic respiratory failure          Luis Eduardo Rojo MD  05/10/24 6733

## 2024-05-11 LAB
ALBUMIN SERPL-MCNC: 3.6 G/DL (ref 3.5–5.2)
ALBUMIN/GLOB SERPL: 1.1 G/DL
ALP SERPL-CCNC: 87 U/L (ref 39–117)
ALT SERPL W P-5'-P-CCNC: 19 U/L (ref 1–33)
ANION GAP SERPL CALCULATED.3IONS-SCNC: 10.3 MMOL/L (ref 5–15)
AST SERPL-CCNC: 15 U/L (ref 1–32)
BILIRUB SERPL-MCNC: 0.4 MG/DL (ref 0–1.2)
BUN SERPL-MCNC: 23 MG/DL (ref 8–23)
BUN/CREAT SERPL: 31.5 (ref 7–25)
CALCIUM SPEC-SCNC: 9.1 MG/DL (ref 8.6–10.5)
CHLORIDE SERPL-SCNC: 98 MMOL/L (ref 98–107)
CO2 SERPL-SCNC: 32.7 MMOL/L (ref 22–29)
CREAT SERPL-MCNC: 0.73 MG/DL (ref 0.57–1)
DEPRECATED RDW RBC AUTO: 54.7 FL (ref 37–54)
EGFRCR SERPLBLD CKD-EPI 2021: 91.4 ML/MIN/1.73
ERYTHROCYTE [DISTWIDTH] IN BLOOD BY AUTOMATED COUNT: 14.9 % (ref 12.3–15.4)
GLOBULIN UR ELPH-MCNC: 3.3 GM/DL
GLUCOSE SERPL-MCNC: 130 MG/DL (ref 65–99)
HCT VFR BLD AUTO: 49.9 % (ref 34–46.6)
HGB BLD-MCNC: 15.6 G/DL (ref 12–15.9)
MCH RBC QN AUTO: 30.9 PG (ref 26.6–33)
MCHC RBC AUTO-ENTMCNC: 31.3 G/DL (ref 31.5–35.7)
MCV RBC AUTO: 98.8 FL (ref 79–97)
PLATELET # BLD AUTO: 256 10*3/MM3 (ref 140–450)
PMV BLD AUTO: 9.9 FL (ref 6–12)
POTASSIUM SERPL-SCNC: 4.3 MMOL/L (ref 3.5–5.2)
PROT SERPL-MCNC: 6.9 G/DL (ref 6–8.5)
RBC # BLD AUTO: 5.05 10*6/MM3 (ref 3.77–5.28)
SODIUM SERPL-SCNC: 141 MMOL/L (ref 136–145)
WBC NRBC COR # BLD AUTO: 9.13 10*3/MM3 (ref 3.4–10.8)

## 2024-05-11 PROCEDURE — 94761 N-INVAS EAR/PLS OXIMETRY MLT: CPT

## 2024-05-11 PROCEDURE — 80053 COMPREHEN METABOLIC PANEL: CPT | Performed by: INTERNAL MEDICINE

## 2024-05-11 PROCEDURE — 85027 COMPLETE CBC AUTOMATED: CPT | Performed by: INTERNAL MEDICINE

## 2024-05-11 PROCEDURE — 25010000002 MORPHINE PER 10 MG: Performed by: INTERNAL MEDICINE

## 2024-05-11 PROCEDURE — 25010000002 FUROSEMIDE PER 20 MG: Performed by: INTERNAL MEDICINE

## 2024-05-11 PROCEDURE — 94799 UNLISTED PULMONARY SVC/PX: CPT

## 2024-05-11 PROCEDURE — 25010000002 ENOXAPARIN PER 10 MG: Performed by: INTERNAL MEDICINE

## 2024-05-11 PROCEDURE — 99232 SBSQ HOSP IP/OBS MODERATE 35: CPT | Performed by: INTERNAL MEDICINE

## 2024-05-11 PROCEDURE — 25010000002 METHYLPREDNISOLONE PER 40 MG: Performed by: INTERNAL MEDICINE

## 2024-05-11 PROCEDURE — 94664 DEMO&/EVAL PT USE INHALER: CPT

## 2024-05-11 RX ADMIN — Medication 5 MG: at 20:41

## 2024-05-11 RX ADMIN — IPRATROPIUM BROMIDE AND ALBUTEROL SULFATE 3 ML: .5; 3 SOLUTION RESPIRATORY (INHALATION) at 19:41

## 2024-05-11 RX ADMIN — LEVOTHYROXINE SODIUM 200 MCG: 0.1 TABLET ORAL at 05:26

## 2024-05-11 RX ADMIN — BUDESONIDE 1 MG: 0.5 INHALANT RESPIRATORY (INHALATION) at 06:51

## 2024-05-11 RX ADMIN — METHYLPREDNISOLONE SODIUM SUCCINATE 40 MG: 40 INJECTION, POWDER, FOR SOLUTION INTRAMUSCULAR; INTRAVENOUS at 01:31

## 2024-05-11 RX ADMIN — LEVOTHYROXINE SODIUM 88 MCG: 88 TABLET ORAL at 05:26

## 2024-05-11 RX ADMIN — METHYLPREDNISOLONE SODIUM SUCCINATE 40 MG: 40 INJECTION, POWDER, FOR SOLUTION INTRAMUSCULAR; INTRAVENOUS at 08:34

## 2024-05-11 RX ADMIN — MORPHINE SULFATE 2 MG: 2 INJECTION, SOLUTION INTRAMUSCULAR; INTRAVENOUS at 16:27

## 2024-05-11 RX ADMIN — ENOXAPARIN SODIUM 40 MG: 100 INJECTION SUBCUTANEOUS at 20:41

## 2024-05-11 RX ADMIN — CETIRIZINE HYDROCHLORIDE 10 MG: 10 TABLET, FILM COATED ORAL at 08:34

## 2024-05-11 RX ADMIN — METOPROLOL TARTRATE 50 MG: 50 TABLET, FILM COATED ORAL at 08:34

## 2024-05-11 RX ADMIN — METOLAZONE 5 MG: 5 TABLET ORAL at 08:34

## 2024-05-11 RX ADMIN — Medication 10 ML: at 08:34

## 2024-05-11 RX ADMIN — FUROSEMIDE 40 MG: 10 INJECTION, SOLUTION INTRAMUSCULAR; INTRAVENOUS at 17:18

## 2024-05-11 RX ADMIN — Medication 1 TABLET: at 08:34

## 2024-05-11 RX ADMIN — BUDESONIDE 1 MG: 0.5 INHALANT RESPIRATORY (INHALATION) at 19:41

## 2024-05-11 RX ADMIN — Medication 10 ML: at 20:41

## 2024-05-11 RX ADMIN — IPRATROPIUM BROMIDE AND ALBUTEROL SULFATE 3 ML: .5; 3 SOLUTION RESPIRATORY (INHALATION) at 06:52

## 2024-05-11 RX ADMIN — METOPROLOL TARTRATE 50 MG: 50 TABLET, FILM COATED ORAL at 20:41

## 2024-05-11 RX ADMIN — IPRATROPIUM BROMIDE AND ALBUTEROL SULFATE 3 ML: .5; 3 SOLUTION RESPIRATORY (INHALATION) at 13:04

## 2024-05-11 RX ADMIN — FLUTICASONE PROPIONATE 2 SPRAY: 50 SPRAY, METERED NASAL at 08:34

## 2024-05-11 RX ADMIN — METHYLPREDNISOLONE SODIUM SUCCINATE 40 MG: 40 INJECTION, POWDER, FOR SOLUTION INTRAMUSCULAR; INTRAVENOUS at 16:27

## 2024-05-11 RX ADMIN — MORPHINE SULFATE 2 MG: 2 INJECTION, SOLUTION INTRAMUSCULAR; INTRAVENOUS at 05:28

## 2024-05-11 RX ADMIN — ENOXAPARIN SODIUM 40 MG: 100 INJECTION SUBCUTANEOUS at 08:34

## 2024-05-11 RX ADMIN — FUROSEMIDE 40 MG: 10 INJECTION, SOLUTION INTRAMUSCULAR; INTRAVENOUS at 08:34

## 2024-05-11 NOTE — PROGRESS NOTES
[facility]    Name:  Patsy Frazier   Age:  63 y.o.  Sex:  female  :  1960  MRN:  [id number]   Visit Number:  [id number]  Admission Date:  5/10/2024  Date Of Service:  24  Primary Care Physician:  Eva Washburn APRN     LOS: 1 day :    Chief Complaint:      hypoxia    Subjective:     echo pending read. Swelling improved somewhat. Still with hypoxia requiring oxygen. Labs stable.    History of Presenting Illness:  63 female,  Doesn't wear oxygen. Tobacco use persistent. COPD. CHF documented but last echo normal. Aflutter s/p Ablation. History of LE edema since age 18. Reports multiple family members with same problem. Went to Dr. Juárez in [place]. Oxygen levels 69% in the clinic. Didn't know she was short of breath until she got there. Reports leg swelling same as it always is. Has not been taking lasix because it makes her feel bad. Sleeps sitting upHasn't took it in months. Full code.     Pertinent findings: bnp 137, trop 6, cr 0.72, covid and flu ok, CXR clear, EKG NSR nonspecific Twave changes    Edited by: Bharath Segal DO at 2024 0900     Review of Systems:     All systems were reviewed and negative except as mentioned in subjective, assessment and plan.    Vital Signs:    Temp:  [97.3 °F (36.3 °C)-98 °F (36.7 °C)] 97.5 °F (36.4 °C)  Heart Rate:  [] 77  Resp:  [18-22] 18  BP: (120-165)/(54-92) 132/63    Intake and output:    I/O last 3 completed shifts:  In: 360 [P.O.:360]  Out: 2400 [Urine:2400]  No intake/output data recorded.    Physical Examination:    Constitutional: Awake, alert  Eyes: PERRLA, sclerae anicteric, no conjunctival injection  HENT: NCAT, mucous membranes moist  Neck: Supple, no thyromegaly, no lymphadenopathy, trachea midline  Respiratory: diminimished with basilar rales bilaterally, nonlabored respirations   Cardiovascular: RRR, no murmurs, rubs, or gallops, palpable pedal pulses  "bilaterally  Gastrointestinal: Positive bowel sounds, soft, nontender, nondistended  Musculoskeletal: 2+ bilateral ankle edema, no clubbing or cyanosis to extremities  Psychiatric: Appropriate affect, cooperative  Neurologic: Oriented x 3, speech clear  Skin: less weeping less red skin on bilateral lower extremities  Edited by: Bharath Segal DO at 5/11/2024 0900     Laboratory results:    Results from last 7 days   Lab Units 05/11/24  0650 05/10/24  1258   SODIUM mmol/L 141 140   POTASSIUM mmol/L 4.3 4.7   CHLORIDE mmol/L 98 101   CO2 mmol/L 32.7* 30.0*   BUN mg/dL 23 21   CREATININE mg/dL 0.73 0.72   CALCIUM mg/dL 9.1 9.3   BILIRUBIN mg/dL 0.4 0.4   ALK PHOS U/L 87 90   ALT (SGPT) U/L 19 20   AST (SGOT) U/L 15 12   GLUCOSE mg/dL 130* 99     Results from last 7 days   Lab Units 05/11/24  0650 05/10/24  1258   WBC 10*3/mm3 9.13 8.45   HEMOGLOBIN g/dL 15.6 15.9   HEMATOCRIT % 49.9* 49.7*   PLATELETS 10*3/mm3 256 242         Results from last 7 days   Lab Units 05/10/24  1651 05/10/24  1258   HSTROP T ng/L 7 <6         No results for input(s): \"PHART\", \"YCH8XSS\", \"PO2ART\", \"ACG8VXK\", \"BASEEXCESS\" in the last 8760 hours.   I have reviewed the patient's laboratory results.    Radiology results:    XR Chest 1 View    Result Date: 5/10/2024  PROCEDURE: XR CHEST 1 VW-  HISTORY: SOA  COMPARISON: 10/5/2017.  FINDINGS: The heart is normal in size. The mediastinum is unremarkable. The lungs are clear. There is no pneumothorax.  There are no acute osseous abnormalities.      Impression: No acute cardiopulmonary process.  Continued followup is recommended.     Images were reviewed, interpreted, and dictated by Dr. Malik Rodriguez MD Transcribed by Bernabe Matos PA-C.  This report was signed and finalized on 5/10/2024 1:37 PM by Malik Rodriguez MD.     I have reviewed the patient's radiology reports.    Medication Review:     I have reviewed the patient's active and prn medications.     Problem List:      Hypoxia    Tobacco " abuse    COPD (chronic obstructive pulmonary disease)    Bilateral lower extremity edema      Assessment/Plan:      Hypoxia    Tobacco abuse    COPD (chronic obstructive pulmonary disease)    Bilateral lower extremity edema    -admit telemetry. Unclear cause of shortness of air. Suspect combination of CHF and COPD.  Will give lasix and metolazone. Read pending for echo. Will need PFTs outpatient. Nebulizer treatments. Wound care. PT/OT. Will give pain medication   Edited by: Bharath Segal DO at 5/11/2024 0900     DVT Prophylaxis: lovenox  Code Status:   Code Status and Medical Interventions:   Ordered at: 05/10/24 1613     Code Status (Patient has no pulse and is not breathing):    CPR (Attempt to Resuscitate)     Medical Interventions (Patient has pulse or is breathing):    Full Support      Diet:   Dietary Orders (From admission, onward)       Start     Ordered    05/10/24 1611  Diet: Cardiac; Healthy Heart (2-3 Na+); Fluid Consistency: Thin (IDDSI 0)  Diet Effective Now        References:    Diet Order [facility]   Question Answer Comment   Diets: Cardiac    Cardiac Diet: Healthy Heart (2-3 Na+)    Fluid Consistency: Thin (IDDSI 0)        05/10/24 1613                   Discharge Plan: anticipate home with home health 1 to 2    Bharath Segal DO  05/11/24  09:00 EDT    Dictated utilizing Dragon dictation.

## 2024-05-11 NOTE — THERAPY DISCHARGE NOTE
PT order was received.  Evaluation was not performed because the patient states she does not require PT services.  She states her mobility is at baseline and she has been up in her room and walking to the bathroom and over to the window.  PT educated on her the importance of mobility to recovery.  She verbalized understanding.  PT will sign off at this time.

## 2024-05-11 NOTE — PLAN OF CARE
Goal Outcome Evaluation:              Outcome Evaluation: VSS on 3L NC. No acute events this shift.

## 2024-05-11 NOTE — PAYOR COMM NOTE
"Patsy Frazier (63 y.o. Female)       Date of Birth   1960    Social Security Number       Address   [address] [facility] [address]    Home Phone   [phone]    MRN   [id number]       [facility]    Marital Status                               Admission Date   5/10/24    Admission Type   Emergency    Admitting Provider   Bharath Segal DO    Attending Provider   Bharath Segal DO    [facility]       Discharge Date       Discharge Disposition       Discharge Destination                                 Attending Provider: Bharath Segal DO    Allergies: No Known Allergies    Isolation: None   Infection: None   Code Status: CPR    Ht: 172.7 cm (67.99\")   Wt: 139 kg (306 lb 3.5 oz)    Admission Cmt: None   Principal Problem: Hypoxia [R09.02]                   Active Insurance as of 5/10/2024       Primary Coverage       Payor Plan Insurance Group Employer/Plan Group    [facility] MEDICARE REPLACEMENT [facility] MED ADV SNP HMO [id number]       Payor Plan Address Payor Plan Phone Number Payor Plan Fax Number Effective Dates    [address] [address]   1/1/2024 - None Entered    [facility] [id number]         Subscriber Name Subscriber Birth Date Member ID       PATSY FRAZIER 1960 [id number]               Secondary Coverage       Payor Plan Insurance Group Employer/Plan Group    [facility] MEDICAID        Payor Plan Address Payor Plan Phone Number Payor Plan Fax Number Effective Dates    [address] [address] [phone]  9/28/2017 - None Entered    [facility] [id number]         Subscriber Name Subscriber Birth Date Member ID       PATSY FRAZIER 1960 [id number]                     Emergency Contacts        (Rel.) Home Phone Work Phone Mobile Phone    ArnaldoAfni (Daughter) [phone] -- --    DAVID ALEGRIA (Son) [phone] -- --                 History & Physical "        Bharath Segal DO at 05/10/24 161            [facility]   HISTORY AND PHYSICAL      Name:  Patsy Frazier   Age:  63 y.o.  Sex:  female  :  1960  MRN:  [id number]   Visit Number:  [id number]  Admission Date:  5/10/2024  Date Of Service:  05/10/24  Primary Care Physician:  Eva Washburn APRN    Chief Complaint:     hypoxia    History Of Presenting Illness:      63 female,  Doesn't wear oxygen. Tobacco use persistent. COPD. CHF documented but last echo normal. Aflutter s/p Ablation. History of LE edema since age 18. Reports multiple family members with same problem. Went to  Appointment in [place]. Oxygen levels 69% in the clinic. Didn't know she was short of breath until she got there. Reports leg swelling same as it always is. Has not been taking lasix because it makes her feel bad. Sleeps sitting upHasn't took it in months. Full code.     Pertinent findings: bnp 137, trop 6, cr 0.72, covid and flu ok, CXR clear, EKG NSR nonspecific Twave changes    ED Medications:    Medications   furosemide (LASIX) injection 80 mg (80 mg Intravenous Given 5/10/24 1410)   acetaminophen (TYLENOL) tablet 1,000 mg (1,000 mg Oral Given 5/10/24 1437)       Edited by: Bharath Segal DO at 5/10/2024 8427     Review Of Systems:    All systems were reviewed and negative except as mentioned in history of presenting illness, assessment and plan.    Past Medical History: Patient  has a past medical history of Arthritis, Asthma, CHF (congestive heart failure), COPD (chronic obstructive pulmonary disease), Hypothyroidism, Stroke (), SVT (supraventricular tachycardia), and Wears glasses.    Past Surgical History: Patient  has a past surgical history that includes Tubal ligation (); Neck surgery (); Cholecystectomy (); and Cardiac electrophysiology procedure (N/A, 11/3/2017).    Social History: Patient  reports that she has been smoking cigarettes. She has a  20 pack-year smoking history. She has never used smokeless tobacco. She reports that she does not drink alcohol and does not use drugs.    Family History:  Patient's family history has been reviewed and found to be noncontributory.     Allergies:      Patient has no known allergies.    Home Medications:    Prior to Admission Medications       Prescriptions Last Dose Informant Patient Reported? Taking?    albuterol (PROVENTIL HFA;VENTOLIN HFA) 108 (90 Base) MCG/ACT inhaler  Self Yes No    Inhale 2 puffs Every 4 (Four) Hours As Needed for Wheezing.    cetirizine (zyrTEC) 10 MG tablet   Yes No    Take 1 tablet by mouth Daily.    diclofenac (VOLTAREN) 75 MG EC tablet   Yes No    Take 1 tablet by mouth 2 (Two) Times a Day.    fluticasone (FLONASE) 50 MCG/ACT nasal spray   Yes No    2 sprays into the nostril(s) as directed by provider Daily.    furosemide (LASIX) 80 MG tablet   No No    take 1 tablet by mouth once daily    levothyroxine (SYNTHROID, LEVOTHROID) 100 MCG tablet  Self Yes No    Take 2 tablets by mouth Daily.    levothyroxine (SYNTHROID, LEVOTHROID) 200 MCG tablet  Self Yes No    Take 1 tablet by mouth Every Morning.    levothyroxine (SYNTHROID, LEVOTHROID) 88 MCG tablet   Yes No    Take 1 tablet by mouth Daily. Take with the 200mg tablet for a total of 288mg per day    metoprolol tartrate (LOPRESSOR) 50 MG tablet   Yes No    Take 1 tablet by mouth 2 (Two) Times a Day.    Multiple Vitamins-Minerals (MULTIVITAMIN ADULT PO)  Self Yes No    Take 1 tablet by mouth Daily.    naproxen sodium (ALEVE) 220 MG tablet  Self Yes No    Take 220 mg by mouth 2 (Two) Times a Day As Needed for Mild Pain .    potassium chloride (K-DUR,KLOR-CON) 20 MEQ CR tablet  Self No No    Take 1 tablet by mouth Daily.    Patient taking differently:  Take 0.5 tablets by mouth Every Morning.    predniSONE (DELTASONE) 5 MG tablet  Self Yes No    Take 1 tablet by mouth Every Morning.    rivaroxaban (XARELTO) 20 MG tablet   No No    Take 1  "tablet by mouth Daily With Dinner.    Umeclidinium-Vilanterol (Anoro Ellipta) 62.5-25 MCG/ACT aerosol powder  inhaler   Yes No    Inhale 1 puff Daily.              Vital Signs:  Temp:  [97.3 °F (36.3 °C)-98 °F (36.7 °C)] 97.3 °F (36.3 °C)  Heart Rate:  [72-89] 84  Resp:  [18] 18  BP: (127-165)/(57-92) 165/57        05/10/24  1251 05/10/24  1511   Weight: (!) 143 kg (315 lb) (!) 140 kg (307 lb 12.2 oz)     Body mass index is 46.8 kg/m².    Physical Exam:     Most recent vital Signs: /57 (BP Location: Right arm, Patient Position: Lying)   Pulse 84   Temp 97.3 °F (36.3 °C) (Oral)   Resp 18   Ht 172.7 cm (68\")   Wt (!) 140 kg (307 lb 12.2 oz)   SpO2 96%   BMI 46.80 kg/m²     Constitutional: Awake, alert  Eyes: PERRLA, sclerae anicteric, no conjunctival injection  HENT: NCAT, mucous membranes moist  Neck: Supple, no thyromegaly, no lymphadenopathy, trachea midline  Respiratory: diminimished with basilar rales bilaterally, nonlabored respirations   Cardiovascular: RRR, no murmurs, rubs, or gallops, palpable pedal pulses bilaterally  Gastrointestinal: Positive bowel sounds, soft, nontender, nondistended  Musculoskeletal: 3+ bilateral ankle edema, no clubbing or cyanosis to extremities  Psychiatric: Appropriate affect, cooperative  Neurologic: Oriented x 3, speech clear  Skin: weeping reddish skin on bilateral lower extremities  Edited by: Bharath Segal DO at 5/10/2024 5024      Laboratory data:    I have reviewed the labs done in the emergency room.    Results from last 7 days   Lab Units 05/10/24  1258   SODIUM mmol/L 140   POTASSIUM mmol/L 4.7   CHLORIDE mmol/L 101   CO2 mmol/L 30.0*   BUN mg/dL 21   CREATININE mg/dL 0.72   CALCIUM mg/dL 9.3   BILIRUBIN mg/dL 0.4   ALK PHOS U/L 90   ALT (SGPT) U/L 20   AST (SGOT) U/L 12   GLUCOSE mg/dL 99     Results from last 7 days   Lab Units 05/10/24  1258   WBC 10*3/mm3 8.45   HEMOGLOBIN g/dL 15.9   HEMATOCRIT % 49.7*   PLATELETS 10*3/mm3 242         Results from " "last 7 days   Lab Units 05/10/24  1258   HSTROP T ng/L <6     Results from last 7 days   Lab Units 05/10/24  1258   PROBNP pg/mL 137.2                       Invalid input(s): \"USDES\", \"NITRITITE\", \"BACT\", \"EP\"    Pain Management Panel           No data to display                EKG:      NSR no ST or T wave changes    Radiology:    XR Chest 1 View    Result Date: 5/10/2024  PROCEDURE: XR CHEST 1 VW-  HISTORY: SOA  COMPARISON: 10/5/2017.  FINDINGS: The heart is normal in size. The mediastinum is unremarkable. The lungs are clear. There is no pneumothorax.  There are no acute osseous abnormalities.      No acute cardiopulmonary process.  Continued followup is recommended.     Images were reviewed, interpreted, and dictated by Dr. Malik Rodriguez MD Transcribed by Bernabe Matos PA-C.  This report was signed and finalized on 5/10/2024 1:37 PM by Malik Rodriguez MD.       Assessment/Plan:      Hypoxia    Tobacco abuse    COPD (chronic obstructive pulmonary disease)    Bilateral lower extremity edema    -admit telemetry. Unclear cause of shortness of air. Suspect combination of CHF and COPD.  Will give lasix. Check echo. Will need PFTs outpatient. Nebulizer treatments. Wound care. PT/OT. Will give pain medication   Edited by: Bharath Segal DO at 5/10/2024 1605           Risk Assessment: moderate  DVT Prophylaxis: lovenox  Code Status:   Code Status and Medical Interventions:   Ordered at: 05/10/24 1613     Code Status (Patient has no pulse and is not breathing):    CPR (Attempt to Resuscitate)     Medical Interventions (Patient has pulse or is breathing):    Full Support      Diet:   Dietary Orders (From admission, onward)       Start     Ordered    05/10/24 1611  Diet: Cardiac; Healthy Heart (2-3 Na+); Fluid Consistency: Thin (IDDSI 0)  Diet Effective Now        References:    Diet Order Crosswalk   Question Answer Comment   Diets: Cardiac    Cardiac Diet: Healthy Heart (2-3 Na+)    Fluid Consistency: Thin (IDDSI 0)  "       05/10/24 1613                     Advance Care Planning  ACP discussion was held with the patient during this visit. Patient does not have an advance directive, declines further assistance.           Bharath Segal DO  05/10/24  16:14 EDT    Dictated utilizing Dragon dictation.     Electronically signed by Bharath Segal DO at 05/10/24 1615          Emergency Department Notes        Paulie Rider at 05/10/24 1445          Carolyne from 3rd floor called for report on pt.    Electronically signed by Paulie Rider at 05/10/24 1445       Luis Eduardo Rojo MD at 05/10/24 1250        Procedure Orders    1. Critical Care [[id number]] ordered by Luis Eduardo Rojo MD                 Subjective:  History of Present Illness:    Patient is a 63-year-old female history of CHF, asthma, hypothyroidism, CVA presents today with increasing pedal edema, shortness of breath.  Reportedly came acutely hypoxic at her doctor's office today after presenting complaining of dyspnea.  2+ pitting edema bilateral, states this is much worse than her baseline.  Per EMS report, patient noncompliant with Lasix therapy at home.  Ultimately required BiPAP and route due to persistent hypoxia.  Now presents to our emergency department.  Denies any fevers.  No chest pain.  Denies any focal weakness.  No abdominal pain nausea vomiting or diarrhea.  Denies dysuria.  Stable on BiPAP.      Nurses Notes reviewed and agree, including vitals, allergies, social history and prior medical history.     REVIEW OF SYSTEMS: All systems reviewed and not pertinent unless noted.  Review of Systems   Constitutional:  Positive for activity change. Negative for appetite change, chills, fatigue and fever.   HENT:  Negative for rhinorrhea, sinus pressure and sinus pain.    Eyes:  Negative for discharge and itching.   Respiratory:  Positive for cough, shortness of breath and wheezing.    Cardiovascular:  Positive for leg swelling. Negative for chest  "pain and palpitations.   Gastrointestinal:  Negative for abdominal distention, abdominal pain, nausea and vomiting.   Endocrine: Negative for cold intolerance and heat intolerance.   Genitourinary:  Negative for decreased urine volume, difficulty urinating, flank pain, frequency, urgency, vaginal bleeding, vaginal discharge and vaginal pain.   Musculoskeletal:  Negative for gait problem, neck pain and neck stiffness.   Skin:  Negative for color change.   Allergic/Immunologic: Negative for environmental allergies.   Neurological:  Negative for seizures, syncope, facial asymmetry and speech difficulty.   Psychiatric/Behavioral:  Negative for self-injury and suicidal ideas.        Past Medical History:   Diagnosis Date    Arthritis     Asthma     CHF (congestive heart failure)     COPD (chronic obstructive pulmonary disease)     Hypothyroidism     Stroke 1976    \"mini stroke\" at the age of 16, no deficits per pt report     SVT (supraventricular tachycardia)     prior hx of     Wears glasses        Allergies:    Patient has no known allergies.      Past Surgical History:   Procedure Laterality Date    CARDIAC ELECTROPHYSIOLOGY PROCEDURE N/A 11/3/2017    Procedure: Ablation atrial flutter vs SVT;  Surgeon: Chavo Carr MD;  [place] [facility];  Service:     CHOLECYSTECTOMY  2010    NECK SURGERY  2007    TUBAL ABDOMINAL LIGATION  1981         Social History     Socioeconomic History    Marital status:    Tobacco Use    Smoking status: Every Day     Current packs/day: 0.50     Average packs/day: 0.5 packs/day for 40.0 years (20.0 ttl pk-yrs)     Types: Cigarettes    Smokeless tobacco: Never   Substance and Sexual Activity    Alcohol use: No    Drug use: No    Sexual activity: Defer         History reviewed. No pertinent family history.    Objective  Physical Exam:  /62   Pulse 80   Temp 98 °F (36.7 °C) (Oral)   Resp 18   Ht 172.7 cm (68\")   Wt (!) 143 kg (315 lb)   SpO2 94%   BMI " 47.90 kg/m²      Physical Exam  Constitutional:       General: She is in acute distress.      Appearance: Normal appearance. She is obese. She is ill-appearing. She is not toxic-appearing.   HENT:      Head: Normocephalic and atraumatic.      Nose: Nose normal. No congestion or rhinorrhea.      Mouth/Throat:      Mouth: Mucous membranes are dry.      Pharynx: Oropharynx is clear. No oropharyngeal exudate or posterior oropharyngeal erythema.   Eyes:      Extraocular Movements: Extraocular movements intact.      Conjunctiva/sclera: Conjunctivae normal.      Pupils: Pupils are equal, round, and reactive to light.   Cardiovascular:      Rate and Rhythm: Normal rate and regular rhythm.      Pulses: Normal pulses.      Heart sounds: Normal heart sounds.   Pulmonary:      Effort: Pulmonary effort is normal. No respiratory distress.      Breath sounds: Normal breath sounds.   Abdominal:      General: Abdomen is flat. Bowel sounds are normal. There is no distension.      Palpations: Abdomen is soft.      Tenderness: There is no abdominal tenderness. There is no guarding or rebound.   Musculoskeletal:         General: No swelling or tenderness. Normal range of motion.      Cervical back: Normal range of motion and neck supple.      Right lower leg: Edema present.      Left lower leg: Edema present.      Comments: Sniffing and 2+ pedal edema bilateral with overlying erythema bilateral appears consistent with chronic venous stasis   Skin:     General: Skin is warm and dry.      Capillary Refill: Capillary refill takes less than 2 seconds.   Neurological:      General: No focal deficit present.      Mental Status: She is alert and oriented to person, place, and time. Mental status is at baseline.      Cranial Nerves: No cranial nerve deficit.      Sensory: No sensory deficit.      Motor: No weakness.      Coordination: Coordination normal.   Psychiatric:         Mood and Affect: Mood normal.         Behavior: Behavior normal.          Thought Content: Thought content normal.         Judgment: Judgment normal.         Critical Care    Performed by: Luis Eduardo Rojo MD  Authorized by: Luis Eduardo Rojo MD    Critical care provider statement:     Critical care time (minutes):  30    Critical care was necessary to treat or prevent imminent or life-threatening deterioration of the following conditions:  Respiratory failure    Critical care was time spent personally by me on the following activities:  Blood draw for specimens, development of treatment plan with patient or surrogate, evaluation of patient's response to treatment, discussions with primary provider, examination of patient, obtaining history from patient or surrogate, ordering and performing treatments and interventions, ordering and review of laboratory studies, ordering and review of radiographic studies, pulse oximetry, re-evaluation of patient's condition and review of old charts    Care discussed with: admitting provider        ED Course:    ED Course as of 05/10/24 1433   Fri May 10, 2024   1428 EKG interpreted by me, normal sinus rhythm with no concerning ST changes noted, rate of 67 [JE]      ED Course User Index  [JE] Luis Eduardo Rojo MD       Lab Results (last 24 hours)       Procedure Component Value Units Date/Time    CBC & Differential [[id number]]  (Abnormal) Collected: 05/10/24 1258    Specimen: Blood Updated: 05/10/24 1315    Narrative:      The following orders were created for panel order CBC & Differential.  Procedure                               Abnormality         Status                     ---------                               -----------         ------                     CBC Auto Differential[[id number]]        Abnormal            Final result                 Please view results for these tests on the individual orders.    Comprehensive Metabolic Panel [[id number]]  (Abnormal) Collected: 05/10/24 1258    Specimen: Blood Updated: 05/10/24 1403      Glucose 99 mg/dL      BUN 21 mg/dL      Creatinine 0.72 mg/dL      Sodium 140 mmol/L      Potassium 4.7 mmol/L      Comment: Slight hemolysis detected by analyzer. Result may be falsely elevated.        Chloride 101 mmol/L      CO2 30.0 mmol/L      Calcium 9.3 mg/dL      Total Protein 7.1 g/dL      Albumin 3.6 g/dL      ALT (SGPT) 20 U/L      AST (SGOT) 12 U/L      Alkaline Phosphatase 90 U/L      Total Bilirubin 0.4 mg/dL      Globulin 3.5 gm/dL      A/G Ratio 1.0 g/dL      BUN/Creatinine Ratio 29.2     Anion Gap 9.0 mmol/L      eGFR 92.9 mL/min/1.73     Narrative:      GFR Normal >60  Chronic Kidney Disease <60  Kidney Failure <15      BNP [[id number]]  (Normal) Collected: 05/10/24 1258    Specimen: Blood Updated: 05/10/24 1334     proBNP 137.2 pg/mL     Narrative:      This assay is used as an aid in the diagnosis of individuals suspected of having heart failure. It can be used as an aid in the diagnosis of acute decompensated heart failure (ADHF) in patients presenting with signs and symptoms of ADHF to the emergency department (ED). In addition, NT-proBNP of <300 pg/mL indicates ADHF is not likely.    Age Range Result Interpretation  NT-proBNP Concentration (pg/mL:      <50             Positive            >450                   Gray                 300-450                    Negative             <300    50-75           Positive            >900                  Gray                300-900                  Negative            <300      >75             Positive            >1800                  Gray                300-1800                  Negative            <300    High Sensitivity Troponin T [[id number]]  (Normal) Collected: 05/10/24 1258    Specimen: Blood Updated: 05/10/24 1334     HS Troponin T <6 ng/L     Narrative:      High Sensitive Troponin T Reference Range:  <14.0 ng/L- Negative Female for AMI  <22.0 ng/L- Negative Male for AMI  >=14 - Abnormal Female indicating possible myocardial injury.  >=22 -  "Abnormal Male indicating possible myocardial injury.   Clinicians would have to utilize clinical acumen, EKG, Troponin, and serial changes to determine if it is an Acute Myocardial Infarction or myocardial injury due to an underlying chronic condition.         D-dimer, Quantitative [[id number]]  (Normal) Collected: 05/10/24 1258    Specimen: Blood Updated: 05/10/24 1322     D-Dimer, Quantitative 0.45 MCGFEU/mL     Narrative:      According to the assay 's published package insert, a normal (<0.50 MCGFEU/mL) D-dimer result in conjunction with a non-high clinical probability assessment, excludes deep vein thrombosis (DVT) and pulmonary embolism (PE) with high sensitivity.    D-dimer values increase with age and this can make VTE exclusion of an older population difficult. To address this, the American College of Phys[employer], based on best available evidence and recent guidelines, recommends that clinicians use age-adjusted D-dimer thresholds in patients greater than 50 years of age with: a) a low probability of PE who do not meet all Pulmonary Embolism Rule Out Criteria, or b) in those with intermediate probability of PE.   The formula for an age-adjusted D-dimer cut-off is \"age/100\".  For example, a 60 year old patient would have an age-adjusted cut-off of 0.60 MCGFEU/mL and an 80 year old 0.80 MCGFEU/mL.    C-reactive Protein [[id number]]  (Abnormal) Collected: 05/10/24 1258    Specimen: Blood Updated: 05/10/24 1331     C-Reactive Protein 2.27 mg/dL     Magnesium [[id number]]  (Normal) Collected: 05/10/24 1258    Specimen: Blood Updated: 05/10/24 1332     Magnesium 2.2 mg/dL     CBC Auto Differential [[id number]]  (Abnormal) Collected: 05/10/24 1258    Specimen: Blood Updated: 05/10/24 1315     WBC 8.45 10*3/mm3      RBC 5.11 10*6/mm3      Hemoglobin 15.9 g/dL      Hematocrit 49.7 %      MCV 97.3 fL      MCH 31.1 pg      MCHC 32.0 g/dL      RDW 15.0 %      RDW-SD 54.2 fl      MPV 9.5 fL      Platelets 242 " 10*3/mm3      Neutrophil % 75.8 %      Lymphocyte % 13.6 %      Monocyte % 7.7 %      Eosinophil % 1.7 %      Basophil % 0.8 %      Immature Grans % 0.4 %      Neutrophils, Absolute 6.41 10*3/mm3      Lymphocytes, Absolute 1.15 10*3/mm3      Monocytes, Absolute 0.65 10*3/mm3      Eosinophils, Absolute 0.14 10*3/mm3      Basophils, Absolute 0.07 10*3/mm3      Immature Grans, Absolute 0.03 10*3/mm3      nRBC 0.0 /100 WBC     COVID-19 and FLU A/B PCR, 1 HR TAT - Swab, Nasopharynx [[id number]]  (Normal) Collected: 05/10/24 1304    Specimen: Swab from Nasopharynx Updated: 05/10/24 1327     COVID19 Not Detected     Influenza A PCR Not Detected     Influenza B PCR Not Detected    Narrative:      Fact sheet for providers: https://www.fda.gov/media/660339/download    Fact sheet for patients: https://www.fda.gov/media/052220/download    Test performed by PCR.             XR Chest 1 View    Result Date: 5/10/2024  PROCEDURE: XR CHEST 1 VW-  HISTORY: SOA  COMPARISON: 10/5/2017.  FINDINGS: The heart is normal in size. The mediastinum is unremarkable. The lungs are clear. There is no pneumothorax.  There are no acute osseous abnormalities.      Impression: No acute cardiopulmonary process.  Continued followup is recommended.     Images were reviewed, interpreted, and dictated by Dr. Malik Rodriguez MD Transcribed by Bernabe Matos PA-C.  This report was signed and finalized on 5/10/2024 1:37 PM by Malik Rodriguez MD.          MDM     Amount and/or Complexity of Data Reviewed  Decide to obtain previous medical records or to obtain history from someone other than the patient: yes        Initial impression of presenting illness: Shortness of breath    DDX: includes but is not limited to: CHF exacerbation, COPD exacerbation, PE, bacterial pneumonia, viral URI    Patient arrives guarded with vitals interpreted by myself.     Pertinent features from physical exam: 2+ pitting edema bilateral, wheezing on exam, nontender to abdominal  palpation, tachycardic with regular rhythm.    Initial diagnostic plan: CBC, CMP, troponin, BNP, CRP, Pro-Ritesh, lactic acid, magnesium, EKG, chest x-ray    Results from initial plan were reviewed and interpreted by me revealing no concern for bacterial pneumonia, no concern for PE.  BNP is not elevated however, given patient's physical exam feels that this is falsely decreased    Diagnostic information from other sources: Reviewed past medical records and discussed with EMS on arrival    Interventions / Re-evaluation: Given my concern for fluid overload given 80 mg IV Lasix, patient remained stable on 3 L nasal cannula in the emergency department, able to be weaned off BiPAP    Results/clinical rationale were discussed with patient at bedside    Consultations/Discussion of results with other physicians: Given my concerns for acute hypoxic respiratory failure secondary to CHF exacerbation discussed with hospitalist, Dr. Segal, and admitted to their service for further management    Disposition plan: Admit  -----        Final diagnoses:   Acute hypoxic respiratory failure          Luis Eduardo Rojo MD  05/10/24 1433      Electronically signed by Luis Eduardo Rojo MD at 05/10/24 1433       Vital Signs (last day)       Date/Time Temp Temp src Pulse Resp BP Patient Position SpO2    05/11/24 0719 97.5 (36.4) Oral -- 18 132/63 Sitting --    05/11/24 0651 -- -- -- 18 -- -- --    05/11/24 0302 97.6 (36.4) Oral 77 20 128/65 Lying 97    05/10/24 2309 97.9 (36.6) Oral 61 20 120/60 Lying 96    05/10/24 1915 97.5 (36.4) Oral 87 20 140/54 Lying 97    05/10/24 1904 -- -- 87 20 -- -- --    05/10/24 1852 -- -- 107 22 -- -- 94    05/10/24 1653 -- -- -- -- 165/57 -- --    05/10/24 1511 97.3 (36.3) Oral 84 18 165/57 Lying 96    05/10/24 1440 -- -- 89 -- -- -- 95    05/10/24 1430 -- -- -- -- 133/92 -- 80    05/10/24 1410 -- -- 80 -- 130/62 -- --    05/10/24 13:59:32 98 (36.7) Oral 81 18 127/63 -- 94    05/10/24 1359 -- -- 81 -- -- --  94    05/10/24 1330 -- -- -- -- 127/63 -- --    05/10/24 1327 -- -- 72 -- -- -- 95    05/10/24 1251 98 (36.7) Oral 72 18 140/70 Sitting 97          Lab Results (last 24 hours)       Procedure Component Value Units Date/Time    Comprehensive Metabolic Panel [[id number]]  (Abnormal) Collected: 05/11/24 0650    Specimen: Blood Updated: 05/11/24 0739     Glucose 130 mg/dL      BUN 23 mg/dL      Creatinine 0.73 mg/dL      Sodium 141 mmol/L      Potassium 4.3 mmol/L      Chloride 98 mmol/L      CO2 32.7 mmol/L      Calcium 9.1 mg/dL      Total Protein 6.9 g/dL      Albumin 3.6 g/dL      ALT (SGPT) 19 U/L      AST (SGOT) 15 U/L      Alkaline Phosphatase 87 U/L      Total Bilirubin 0.4 mg/dL      Globulin 3.3 gm/dL      A/G Ratio 1.1 g/dL      BUN/Creatinine Ratio 31.5     Anion Gap 10.3 mmol/L      eGFR 91.4 mL/min/1.73     Narrative:      GFR Normal >60  Chronic Kidney Disease <60  Kidney Failure <15      CBC (No Diff) [[id number]]  (Abnormal) Collected: 05/11/24 0650    Specimen: Blood Updated: 05/11/24 0721     WBC 9.13 10*3/mm3      RBC 5.05 10*6/mm3      Hemoglobin 15.6 g/dL      Hematocrit 49.9 %      MCV 98.8 fL      MCH 30.9 pg      MCHC 31.3 g/dL      RDW 14.9 %      RDW-SD 54.7 fl      MPV 9.9 fL      Platelets 256 10*3/mm3     High Sensitivity Troponin T 2Hr [[id number]] Collected: 05/10/24 1651    Specimen: Blood Updated: 05/10/24 1807     HS Troponin T 7 ng/L      Troponin T Delta --     Comment: Unable to calculate.       Narrative:      High Sensitive Troponin T Reference Range:  <14.0 ng/L- Negative Female for AMI  <22.0 ng/L- Negative Male for AMI  >=14 - Abnormal Female indicating possible myocardial injury.  >=22 - Abnormal Male indicating possible myocardial injury.   Clinicians would have to utilize clinical acumen, EKG, Troponin, and serial changes to determine if it is an Acute Myocardial Infarction or myocardial injury due to an underlying chronic condition.         Procalcitonin [[id number]]  (Normal)  "Collected: 05/10/24 1258    Specimen: Blood Updated: 05/10/24 1458     Procalcitonin <0.02 ng/mL     Narrative:      As a Marker for Sepsis (Non-Neonates):    1. <0.5 ng/mL represents a low risk of severe sepsis and/or septic shock.  2. >2 ng/mL represents a high risk of severe sepsis and/or septic shock.    As a Marker for Lower Respiratory Tract Infections that require antibiotic therapy:    PCT on Admission    Antibiotic Therapy       6-12 Hrs later    >0.5                Strongly Recommended  >0.25 - <0.5        Recommended   0.1 - 0.25          Discouraged              Remeasure/reassess PCT  <0.1                Strongly Discouraged     Remeasure/reassess PCT    As 28 day mortality risk marker: \"Change in Procalcitonin Result\" (>80% or <=80%) if Day 0 (or Day 1) and Day 4 values are available. Refer to http://www.[employer]-pct-calculator.com    Change in PCT <=80%  A decrease of PCT levels below or equal to 80% defines a positive change in PCT test result representing a higher risk for 28-day all-cause mortality of patients diagnosed with severe sepsis for septic shock.    Change in PCT >80%  A decrease of PCT levels of more than 80% defines a negative change in PCT result representing a lower risk for 28-day all-cause mortality of patients diagnosed with severe sepsis or septic shock.       Comprehensive Metabolic Panel [[id number]]  (Abnormal) Collected: 05/10/24 1258    Specimen: Blood Updated: 05/10/24 1403     Glucose 99 mg/dL      BUN 21 mg/dL      Creatinine 0.72 mg/dL      Sodium 140 mmol/L      Potassium 4.7 mmol/L      Comment: Slight hemolysis detected by analyzer. Result may be falsely elevated.        Chloride 101 mmol/L      CO2 30.0 mmol/L      Calcium 9.3 mg/dL      Total Protein 7.1 g/dL      Albumin 3.6 g/dL      ALT (SGPT) 20 U/L      AST (SGOT) 12 U/L      Alkaline Phosphatase 90 U/L      Total Bilirubin 0.4 mg/dL      Globulin 3.5 gm/dL      A/G Ratio 1.0 g/dL      BUN/Creatinine Ratio 29.2     " Anion Gap 9.0 mmol/L      eGFR 92.9 mL/min/1.73     Narrative:      GFR Normal >60  Chronic Kidney Disease <60  Kidney Failure <15      BNP [[id number]]  (Normal) Collected: 05/10/24 1258    Specimen: Blood Updated: 05/10/24 1334     proBNP 137.2 pg/mL     Narrative:      This assay is used as an aid in the diagnosis of individuals suspected of having heart failure. It can be used as an aid in the diagnosis of acute decompensated heart failure (ADHF) in patients presenting with signs and symptoms of ADHF to the emergency department (ED). In addition, NT-proBNP of <300 pg/mL indicates ADHF is not likely.    Age Range Result Interpretation  NT-proBNP Concentration (pg/mL:      <50             Positive            >450                   Gray                 300-450                    Negative             <300    50-75           Positive            >900                  Gray                300-900                  Negative            <300      >75             Positive            >1800                  Gray                300-1800                  Negative            <300    High Sensitivity Troponin T [[id number]]  (Normal) Collected: 05/10/24 1258    Specimen: Blood Updated: 05/10/24 1334     HS Troponin T <6 ng/L     Narrative:      High Sensitive Troponin T Reference Range:  <14.0 ng/L- Negative Female for AMI  <22.0 ng/L- Negative Male for AMI  >=14 - Abnormal Female indicating possible myocardial injury.  >=22 - Abnormal Male indicating possible myocardial injury.   Clinicians would have to utilize clinical acumen, EKG, Troponin, and serial changes to determine if it is an Acute Myocardial Infarction or myocardial injury due to an underlying chronic condition.         Magnesium [[id number]]  (Normal) Collected: 05/10/24 1258    Specimen: Blood Updated: 05/10/24 1332     Magnesium 2.2 mg/dL     C-reactive Protein [[id number]]  (Abnormal) Collected: 05/10/24 1258    Specimen: Blood Updated: 05/10/24 1331     C-Reactive  "Protein 2.27 mg/dL     COVID-19 and FLU A/B PCR, 1 HR TAT - Swab, Nasopharynx [[id number]]  (Normal) Collected: 05/10/24 1304    Specimen: Swab from Nasopharynx Updated: 05/10/24 1327     COVID19 Not Detected     Influenza A PCR Not Detected     Influenza B PCR Not Detected    Narrative:      Fact sheet for providers: https://www.fda.gov/media/080340/download    Fact sheet for patients: https://www.fda.gov/media/178054/download    Test performed by PCR.    D-dimer, Quantitative [[id number]]  (Normal) Collected: 05/10/24 1258    Specimen: Blood Updated: 05/10/24 1322     D-Dimer, Quantitative 0.45 MCGFEU/mL     Narrative:      According to the assay 's published package insert, a normal (<0.50 MCGFEU/mL) D-dimer result in conjunction with a non-high clinical probability assessment, excludes deep vein thrombosis (DVT) and pulmonary embolism (PE) with high sensitivity.    D-dimer values increase with age and this can make VTE exclusion of an older population difficult. To address this, the [employer], based on best available evidence and recent guidelines, recommends that clinicians use age-adjusted D-dimer thresholds in patients greater than 50 years of age with: a) a low probability of PE who do not meet all Pulmonary Embolism Rule Out Criteria, or b) in those with intermediate probability of PE.   The formula for an age-adjusted D-dimer cut-off is \"age/100\".  For example, a 60 year old patient would have an age-adjusted cut-off of 0.60 MCGFEU/mL and an 80 year old 0.80 MCGFEU/mL.    CBC & Differential [[id number]]  (Abnormal) Collected: 05/10/24 1258    Specimen: Blood Updated: 05/10/24 1315    Narrative:      The following orders were created for panel order CBC & Differential.  Procedure                               Abnormality         Status                     ---------                               -----------         ------                     CBC Auto Differential[[id number]]      "   Abnormal            Final result                 Please view results for these tests on the individual orders.    CBC Auto Differential [[id number]]  (Abnormal) Collected: 05/10/24 1258    Specimen: Blood Updated: 05/10/24 1315     WBC 8.45 10*3/mm3      RBC 5.11 10*6/mm3      Hemoglobin 15.9 g/dL      Hematocrit 49.7 %      MCV 97.3 fL      MCH 31.1 pg      MCHC 32.0 g/dL      RDW 15.0 %      RDW-SD 54.2 fl      MPV 9.5 fL      Platelets 242 10*3/mm3      Neutrophil % 75.8 %      Lymphocyte % 13.6 %      Monocyte % 7.7 %      Eosinophil % 1.7 %      Basophil % 0.8 %      Immature Grans % 0.4 %      Neutrophils, Absolute 6.41 10*3/mm3      Lymphocytes, Absolute 1.15 10*3/mm3      Monocytes, Absolute 0.65 10*3/mm3      Eosinophils, Absolute 0.14 10*3/mm3      Basophils, Absolute 0.07 10*3/mm3      Immature Grans, Absolute 0.03 10*3/mm3      nRBC 0.0 /100 WBC           Imaging Results (Last 24 Hours)       Procedure Component Value Units Date/Time    XR Chest 1 View [[id number]] Collected: 05/10/24 1313     Updated: 05/10/24 1339    Narrative:      PROCEDURE: XR CHEST 1 VW-     HISTORY: SOA     COMPARISON: 10/5/2017.     FINDINGS: The heart is normal in size. The mediastinum is unremarkable.  The lungs are clear. There is no pneumothorax.  There are no acute  osseous abnormalities.       Impression:      No acute cardiopulmonary process.     Continued followup is recommended.              Images were reviewed, interpreted, and dictated by Dr. Malik Rodriguez MD  Transcribed by Bernabe Matos PA-C.     This report was signed and finalized on 5/10/2024 1:37 PM by Malik Rodriguez MD.             Physician Progress Notes (last 24 hours)  Notes from 05/10/24 0803 through 05/11/24 0803   No notes of this type exist for this encounter.       Consult Notes (last 24 hours)  Notes from 05/10/24 0803 through 05/11/24 0803   No notes of this type exist for this encounter.

## 2024-05-12 ENCOUNTER — APPOINTMENT (OUTPATIENT)
Dept: CT IMAGING | Facility: HOSPITAL | Age: 64
DRG: 191 | End: 2024-05-12
Payer: MEDICARE

## 2024-05-12 LAB
ANION GAP SERPL CALCULATED.3IONS-SCNC: 9.5 MMOL/L (ref 5–15)
BUN SERPL-MCNC: 29 MG/DL (ref 8–23)
BUN/CREAT SERPL: 38.2 (ref 7–25)
CALCIUM SPEC-SCNC: 9.3 MG/DL (ref 8.6–10.5)
CHLORIDE SERPL-SCNC: 95 MMOL/L (ref 98–107)
CO2 SERPL-SCNC: 34.5 MMOL/L (ref 22–29)
CREAT SERPL-MCNC: 0.76 MG/DL (ref 0.57–1)
DEPRECATED RDW RBC AUTO: 52.9 FL (ref 37–54)
EGFRCR SERPLBLD CKD-EPI 2021: 87.1 ML/MIN/1.73
ERYTHROCYTE [DISTWIDTH] IN BLOOD BY AUTOMATED COUNT: 14.6 % (ref 12.3–15.4)
GLUCOSE SERPL-MCNC: 122 MG/DL (ref 65–99)
HCT VFR BLD AUTO: 46.7 % (ref 34–46.6)
HGB BLD-MCNC: 14.7 G/DL (ref 12–15.9)
MCH RBC QN AUTO: 30.6 PG (ref 26.6–33)
MCHC RBC AUTO-ENTMCNC: 31.5 G/DL (ref 31.5–35.7)
MCV RBC AUTO: 97.3 FL (ref 79–97)
PLATELET # BLD AUTO: 263 10*3/MM3 (ref 140–450)
PMV BLD AUTO: 9.9 FL (ref 6–12)
POTASSIUM SERPL-SCNC: 4.3 MMOL/L (ref 3.5–5.2)
RBC # BLD AUTO: 4.8 10*6/MM3 (ref 3.77–5.28)
SODIUM SERPL-SCNC: 139 MMOL/L (ref 136–145)
WBC NRBC COR # BLD AUTO: 10.27 10*3/MM3 (ref 3.4–10.8)

## 2024-05-12 PROCEDURE — 94799 UNLISTED PULMONARY SVC/PX: CPT

## 2024-05-12 PROCEDURE — 94664 DEMO&/EVAL PT USE INHALER: CPT

## 2024-05-12 PROCEDURE — 71250 CT THORAX DX C-: CPT

## 2024-05-12 PROCEDURE — 80048 BASIC METABOLIC PNL TOTAL CA: CPT | Performed by: INTERNAL MEDICINE

## 2024-05-12 PROCEDURE — 99232 SBSQ HOSP IP/OBS MODERATE 35: CPT | Performed by: INTERNAL MEDICINE

## 2024-05-12 PROCEDURE — 85027 COMPLETE CBC AUTOMATED: CPT | Performed by: INTERNAL MEDICINE

## 2024-05-12 PROCEDURE — 94761 N-INVAS EAR/PLS OXIMETRY MLT: CPT

## 2024-05-12 PROCEDURE — 25010000002 MORPHINE PER 10 MG: Performed by: INTERNAL MEDICINE

## 2024-05-12 PROCEDURE — 25010000002 FUROSEMIDE PER 20 MG: Performed by: INTERNAL MEDICINE

## 2024-05-12 PROCEDURE — 25010000002 METHYLPREDNISOLONE PER 40 MG: Performed by: INTERNAL MEDICINE

## 2024-05-12 RX ORDER — METOPROLOL TARTRATE 1 MG/ML
5 INJECTION, SOLUTION INTRAVENOUS
Status: DISCONTINUED | OUTPATIENT
Start: 2024-05-12 | End: 2024-05-13 | Stop reason: HOSPADM

## 2024-05-12 RX ORDER — METOLAZONE 5 MG/1
5 TABLET ORAL ONCE
Status: COMPLETED | OUTPATIENT
Start: 2024-05-12 | End: 2024-05-12

## 2024-05-12 RX ADMIN — METHYLPREDNISOLONE SODIUM SUCCINATE 40 MG: 40 INJECTION, POWDER, FOR SOLUTION INTRAMUSCULAR; INTRAVENOUS at 08:34

## 2024-05-12 RX ADMIN — IPRATROPIUM BROMIDE AND ALBUTEROL SULFATE 3 ML: .5; 3 SOLUTION RESPIRATORY (INHALATION) at 12:45

## 2024-05-12 RX ADMIN — LEVOTHYROXINE SODIUM 200 MCG: 0.1 TABLET ORAL at 06:22

## 2024-05-12 RX ADMIN — LEVOTHYROXINE SODIUM 88 MCG: 88 TABLET ORAL at 06:22

## 2024-05-12 RX ADMIN — METOPROLOL TARTRATE 50 MG: 50 TABLET, FILM COATED ORAL at 20:17

## 2024-05-12 RX ADMIN — Medication 5 MG: at 20:17

## 2024-05-12 RX ADMIN — METHYLPREDNISOLONE SODIUM SUCCINATE 40 MG: 40 INJECTION, POWDER, FOR SOLUTION INTRAMUSCULAR; INTRAVENOUS at 23:55

## 2024-05-12 RX ADMIN — METHYLPREDNISOLONE SODIUM SUCCINATE 40 MG: 40 INJECTION, POWDER, FOR SOLUTION INTRAMUSCULAR; INTRAVENOUS at 17:07

## 2024-05-12 RX ADMIN — IPRATROPIUM BROMIDE AND ALBUTEROL SULFATE 3 ML: .5; 3 SOLUTION RESPIRATORY (INHALATION) at 18:41

## 2024-05-12 RX ADMIN — CETIRIZINE HYDROCHLORIDE 10 MG: 10 TABLET, FILM COATED ORAL at 08:34

## 2024-05-12 RX ADMIN — RIVAROXABAN 20 MG: 10 TABLET, FILM COATED ORAL at 06:22

## 2024-05-12 RX ADMIN — Medication 10 ML: at 08:34

## 2024-05-12 RX ADMIN — BUDESONIDE 1 MG: 0.5 INHALANT RESPIRATORY (INHALATION) at 07:22

## 2024-05-12 RX ADMIN — MORPHINE SULFATE 2 MG: 2 INJECTION, SOLUTION INTRAMUSCULAR; INTRAVENOUS at 23:52

## 2024-05-12 RX ADMIN — ACETAMINOPHEN 650 MG: 325 TABLET, FILM COATED ORAL at 08:34

## 2024-05-12 RX ADMIN — METHYLPREDNISOLONE SODIUM SUCCINATE 40 MG: 40 INJECTION, POWDER, FOR SOLUTION INTRAMUSCULAR; INTRAVENOUS at 02:12

## 2024-05-12 RX ADMIN — FUROSEMIDE 40 MG: 10 INJECTION, SOLUTION INTRAMUSCULAR; INTRAVENOUS at 08:34

## 2024-05-12 RX ADMIN — FUROSEMIDE 40 MG: 10 INJECTION, SOLUTION INTRAMUSCULAR; INTRAVENOUS at 17:07

## 2024-05-12 RX ADMIN — IPRATROPIUM BROMIDE AND ALBUTEROL SULFATE 3 ML: .5; 3 SOLUTION RESPIRATORY (INHALATION) at 07:22

## 2024-05-12 RX ADMIN — METOPROLOL TARTRATE 50 MG: 50 TABLET, FILM COATED ORAL at 08:34

## 2024-05-12 RX ADMIN — METOLAZONE 5 MG: 5 TABLET ORAL at 09:37

## 2024-05-12 RX ADMIN — Medication 1 TABLET: at 08:34

## 2024-05-12 RX ADMIN — FLUTICASONE PROPIONATE 2 SPRAY: 50 SPRAY, METERED NASAL at 08:35

## 2024-05-12 RX ADMIN — BUDESONIDE 1 MG: 0.5 INHALANT RESPIRATORY (INHALATION) at 18:41

## 2024-05-12 NOTE — PLAN OF CARE
Goal Outcome Evaluation:  Plan of Care Reviewed With: patient        Progress: no change  Outcome Evaluation: VSS. 4L NC. no acute overnight events

## 2024-05-12 NOTE — PROGRESS NOTES
[facility]    Name:  Patsy Frazier   Age:  63 y.o.  Sex:  female  :  1960  MRN:  [id number]   Visit Number:  [id number]  Admission Date:  5/10/2024  Date Of Service:  24  Primary Care Physician:  Eva Washburn APRN     LOS: 2 days :    Chief Complaint:      Leg weeping    Subjective:    : Declined PT services. echo pending read. Swelling improved somewhat. Still with hypoxia requiring oxygen. Weight significantly decreased. Labs stable. Will check walking oximetry.    History of Presenting Illness:  63 female,  Doesn't wear oxygen. Tobacco use persistent. COPD. CHF documented but last echo normal. Aflutter s/p Ablation. History of LE edema since age 18. Reports multiple family members with same problem. Went to Dr. Juárez in [place]. Oxygen levels 69% in the clinic. Didn't know she was short of breath until she got there. Reports leg swelling same as it always is. Has not been taking lasix because it makes her feel bad. Sleeps sitting upHasn't took it in months. Full code.     Pertinent findings: bnp 137, trop 6, cr 0.72, covid and flu ok, CXR clear, EKG NSR nonspecific Twave changes    Edited by: Bharath Segal DO at 2024 0753     Review of Systems:     All systems were reviewed and negative except as mentioned in subjective, assessment and plan.    Vital Signs:    Temp:  [97.3 °F (36.3 °C)-98.4 °F (36.9 °C)] 97.5 °F (36.4 °C)  Heart Rate:  [58-89] 74  Resp:  [16-18] 18  BP: (116-134)/(53-71) 131/64    Intake and output:    I/O last 3 completed shifts:  In: 360 [P.O.:360]  Out: 3050 [Urine:3050]  I/O this shift:  In: 360 [P.O.:360]  Out: -     Physical Examination:    Constitutional: Awake, alert  Eyes: PERRLA, sclerae anicteric, no conjunctival injection  HENT: NCAT, mucous membranes moist  Neck: Supple, no thyromegaly, no lymphadenopathy, trachea midline  Respiratory: diminimished with basilar rales bilaterally,  "nonlabored respirations   Cardiovascular: RRR, no murmurs, rubs, or gallops, palpable pedal pulses bilaterally  Gastrointestinal: Positive bowel sounds, soft, nontender, nondistended  Musculoskeletal: 1+ bilateral ankle edema, no clubbing or cyanosis to extremities  Psychiatric: Appropriate affect, cooperative  Neurologic: Oriented x 3, speech clear  Skin:persistentweeping less red skin on bilateral lower extremities  Edited by: Bharath Segal DO at 5/12/2024 0901     Laboratory results:    Results from last 7 days   Lab Units 05/12/24  0627 05/11/24  0650 05/10/24  1258   SODIUM mmol/L 139 141 140   POTASSIUM mmol/L 4.3 4.3 4.7   CHLORIDE mmol/L 95* 98 101   CO2 mmol/L 34.5* 32.7* 30.0*   BUN mg/dL 29* 23 21   CREATININE mg/dL 0.76 0.73 0.72   CALCIUM mg/dL 9.3 9.1 9.3   BILIRUBIN mg/dL  --  0.4 0.4   ALK PHOS U/L  --  87 90   ALT (SGPT) U/L  --  19 20   AST (SGOT) U/L  --  15 12   GLUCOSE mg/dL 122* 130* 99     Results from last 7 days   Lab Units 05/12/24  0627 05/11/24  0650 05/10/24  1258   WBC 10*3/mm3 10.27 9.13 8.45   HEMOGLOBIN g/dL 14.7 15.6 15.9   HEMATOCRIT % 46.7* 49.9* 49.7*   PLATELETS 10*3/mm3 263 256 242         Results from last 7 days   Lab Units 05/10/24  1651 05/10/24  1258   HSTROP T ng/L 7 <6         No results for input(s): \"PHART\", \"XYX6IVO\", \"PO2ART\", \"RPZ7TIF\", \"BASEEXCESS\" in the last 8760 hours.   I have reviewed the patient's laboratory results.    Radiology results:    XR Chest 1 View    Result Date: 5/10/2024  PROCEDURE: XR CHEST 1 VW-  HISTORY: SOA  COMPARISON: 10/5/2017.  FINDINGS: The heart is normal in size. The mediastinum is unremarkable. The lungs are clear. There is no pneumothorax.  There are no acute osseous abnormalities.      Impression: No acute cardiopulmonary process.  Continued followup is recommended.     Images were reviewed, interpreted, and dictated by Dr. Malik Rodriguez MD Transcribed by Bernabe Matos PA-C.  This report was signed and finalized on 5/10/2024 " 1:37 PM by Malik Rodriguez MD.     I have reviewed the patient's radiology reports.    Medication Review:     I have reviewed the patient's active and prn medications.     Problem List:      Hypoxia    Tobacco abuse    COPD (chronic obstructive pulmonary disease)    Bilateral lower extremity edema      Assessment/Plan:      Hypoxia    Tobacco abuse    COPD (chronic obstructive pulmonary disease)    Bilateral lower extremity edema    -admit telemetry. Unclear cause of shortness of air. Suspect combination of CHF and COPD.  Will give lasix and metolazone. Read pending for echo. Will need PFTs outpatient. Nebulizer treatments. Wound care. PT/OT. Will give pain medication  Will check CT Lung  Edited by: Bharath Segal DO at 5/12/2024 0901     DVT Prophylaxis: Xarelto  Code Status:   Code Status and Medical Interventions:   Ordered at: 05/10/24 1613     Code Status (Patient has no pulse and is not breathing):    CPR (Attempt to Resuscitate)     Medical Interventions (Patient has pulse or is breathing):    Full Support      Diet:   Dietary Orders (From admission, onward)       Start     Ordered    05/10/24 1611  Diet: Cardiac; Healthy Heart (2-3 Na+); Fluid Consistency: Thin (IDDSI 0)  Diet Effective Now        References:    Diet Order [place]   Question Answer Comment   Diets: Cardiac    Cardiac Diet: Healthy Heart (2-3 Na+)    Fluid Consistency: Thin (IDDSI 0)        05/10/24 1613                   Discharge Plan: home with  tomorrow    Bharath Segal DO  05/12/24  09:01 EDT    Dictated utilizing Dragon dictation.

## 2024-05-12 NOTE — CASE MANAGEMENT/SOCIAL WORK
Case Management/Social Work    Patient Name:  Patsy Frazier  YOB: 1960  MRN: [id number]  Admit Date:  5/10/2024        09:05 EDT  Met with patient at bedside. Plan to DC home with home health. Referral sent to [facility], need to follow up on Monday morning. If patient can't get home health for wound care, need to consider STR. CM will continue to follow.      Electronically signed by:  Heath Epstein RN  05/12/24 09:05 EDT

## 2024-05-12 NOTE — PLAN OF CARE
Goal Outcome Evaluation:      No acute events during shift. Pain controlled with prn medications. Vitals stable on 4 liters nc.

## 2024-05-13 ENCOUNTER — READMISSION MANAGEMENT (OUTPATIENT)
Dept: CALL CENTER | Facility: HOSPITAL | Age: 64
End: 2024-05-13
Payer: MEDICARE

## 2024-05-13 VITALS
TEMPERATURE: 97.9 F | BODY MASS INDEX: 44.41 KG/M2 | HEART RATE: 71 BPM | DIASTOLIC BLOOD PRESSURE: 73 MMHG | SYSTOLIC BLOOD PRESSURE: 129 MMHG | RESPIRATION RATE: 16 BRPM | HEIGHT: 68 IN | OXYGEN SATURATION: 93 % | WEIGHT: 293 LBS

## 2024-05-13 LAB
BH CV ECHO MEAS - AO MAX PG: 6.1 MMHG
BH CV ECHO MEAS - AO MEAN PG: 4 MMHG
BH CV ECHO MEAS - AO ROOT DIAM: 3.7 CM
BH CV ECHO MEAS - AO V2 MAX: 123 CM/SEC
BH CV ECHO MEAS - AO V2 VTI: 24.7 CM
BH CV ECHO MEAS - AVA(I,D): 3.1 CM2
BH CV ECHO MEAS - EDV(CUBED): 75.2 ML
BH CV ECHO MEAS - EDV(MOD-SP2): 68.1 ML
BH CV ECHO MEAS - EDV(MOD-SP4): 111 ML
BH CV ECHO MEAS - EF(MOD-BP): 58.1 %
BH CV ECHO MEAS - EF(MOD-SP2): 58.6 %
BH CV ECHO MEAS - EF(MOD-SP4): 57.1 %
BH CV ECHO MEAS - ESV(CUBED): 14 ML
BH CV ECHO MEAS - ESV(MOD-SP2): 28.2 ML
BH CV ECHO MEAS - ESV(MOD-SP4): 47.6 ML
BH CV ECHO MEAS - FS: 42.9 %
BH CV ECHO MEAS - IVS/LVPW: 0.89 CM
BH CV ECHO MEAS - IVSD: 1.1 CM
BH CV ECHO MEAS - LA DIMENSION: 2.8 CM
BH CV ECHO MEAS - LAT PEAK E' VEL: 8.7 CM/SEC
BH CV ECHO MEAS - LV DIASTOLIC VOL/BSA (35-75): 45.2 CM2
BH CV ECHO MEAS - LV MASS(C)D: 172.9 GRAMS
BH CV ECHO MEAS - LV MAX PG: 5.2 MMHG
BH CV ECHO MEAS - LV MEAN PG: 3 MMHG
BH CV ECHO MEAS - LV SYSTOLIC VOL/BSA (12-30): 19.4 CM2
BH CV ECHO MEAS - LV V1 MAX: 114 CM/SEC
BH CV ECHO MEAS - LV V1 VTI: 21.7 CM
BH CV ECHO MEAS - LVIDD: 4.2 CM
BH CV ECHO MEAS - LVIDS: 2.41 CM
BH CV ECHO MEAS - LVOT AREA: 3.5 CM2
BH CV ECHO MEAS - LVOT DIAM: 2.12 CM
BH CV ECHO MEAS - LVPWD: 1.24 CM
BH CV ECHO MEAS - MED PEAK E' VEL: 7 CM/SEC
BH CV ECHO MEAS - MV A MAX VEL: 67.3 CM/SEC
BH CV ECHO MEAS - MV DEC SLOPE: 222 CM/SEC2
BH CV ECHO MEAS - MV DEC TIME: 0.32 SEC
BH CV ECHO MEAS - MV E MAX VEL: 70.3 CM/SEC
BH CV ECHO MEAS - MV E/A: 1.04
BH CV ECHO MEAS - MV MAX PG: 3.2 MMHG
BH CV ECHO MEAS - MV MEAN PG: 2 MMHG
BH CV ECHO MEAS - MV V2 VTI: 23.5 CM
BH CV ECHO MEAS - MVA(VTI): 3.3 CM2
BH CV ECHO MEAS - PA ACC TIME: 0.16 SEC
BH CV ECHO MEAS - PA V2 MAX: 102 CM/SEC
BH CV ECHO MEAS - RAP SYSTOLE: 3 MMHG
BH CV ECHO MEAS - RV MAX PG: 1.95 MMHG
BH CV ECHO MEAS - RV V1 MAX: 69.8 CM/SEC
BH CV ECHO MEAS - RV V1 VTI: 12.7 CM
BH CV ECHO MEAS - RVSP: 30.8 MMHG
BH CV ECHO MEAS - SV(LVOT): 76.6 ML
BH CV ECHO MEAS - SV(MOD-SP2): 39.9 ML
BH CV ECHO MEAS - SV(MOD-SP4): 63.4 ML
BH CV ECHO MEAS - SVI(LVOT): 31.2 ML/M2
BH CV ECHO MEAS - SVI(MOD-SP2): 16.2 ML/M2
BH CV ECHO MEAS - SVI(MOD-SP4): 25.8 ML/M2
BH CV ECHO MEAS - TAPSE (>1.6): 2.9 CM
BH CV ECHO MEAS - TR MAX PG: 27.8 MMHG
BH CV ECHO MEAS - TR MAX VEL: 262.5 CM/SEC
BH CV ECHO MEASUREMENTS AVERAGE E/E' RATIO: 8.96
BH CV XLRA - TDI S': 15.7 CM/SEC

## 2024-05-13 PROCEDURE — 25010000002 METHYLPREDNISOLONE PER 40 MG: Performed by: INTERNAL MEDICINE

## 2024-05-13 PROCEDURE — 94618 PULMONARY STRESS TESTING: CPT

## 2024-05-13 PROCEDURE — 94799 UNLISTED PULMONARY SVC/PX: CPT

## 2024-05-13 PROCEDURE — 94664 DEMO&/EVAL PT USE INHALER: CPT

## 2024-05-13 PROCEDURE — 97165 OT EVAL LOW COMPLEX 30 MIN: CPT

## 2024-05-13 PROCEDURE — 94761 N-INVAS EAR/PLS OXIMETRY MLT: CPT

## 2024-05-13 PROCEDURE — 25010000002 FUROSEMIDE PER 20 MG: Performed by: INTERNAL MEDICINE

## 2024-05-13 PROCEDURE — 99239 HOSP IP/OBS DSCHRG MGMT >30: CPT | Performed by: INTERNAL MEDICINE

## 2024-05-13 RX ORDER — PREDNISONE 20 MG/1
40 TABLET ORAL DAILY
Qty: 6 TABLET | Refills: 0 | Status: SHIPPED | OUTPATIENT
Start: 2024-05-13 | End: 2024-05-16

## 2024-05-13 RX ORDER — UMECLIDINIUM BROMIDE AND VILANTEROL TRIFENATATE 62.5; 25 UG/1; UG/1
1 POWDER RESPIRATORY (INHALATION)
Qty: 60 EACH | Refills: 0 | Status: SHIPPED | OUTPATIENT
Start: 2024-05-13 | End: 2024-06-12

## 2024-05-13 RX ORDER — FUROSEMIDE 80 MG
80 TABLET ORAL DAILY
Qty: 30 TABLET | Refills: 0 | Status: SHIPPED | OUTPATIENT
Start: 2024-05-13 | End: 2024-05-13

## 2024-05-13 RX ORDER — POTASSIUM CHLORIDE 20 MEQ/1
20 TABLET, EXTENDED RELEASE ORAL DAILY
Qty: 90 TABLET | Refills: 3 | Status: SHIPPED | OUTPATIENT
Start: 2024-05-13

## 2024-05-13 RX ORDER — FUROSEMIDE 80 MG
80 TABLET ORAL DAILY
Qty: 30 TABLET | Refills: 0 | Status: SHIPPED | OUTPATIENT
Start: 2024-05-13

## 2024-05-13 RX ORDER — PREDNISONE 20 MG/1
40 TABLET ORAL DAILY
Qty: 6 TABLET | Refills: 0 | Status: SHIPPED | OUTPATIENT
Start: 2024-05-13 | End: 2024-05-13

## 2024-05-13 RX ORDER — UMECLIDINIUM BROMIDE AND VILANTEROL TRIFENATATE 62.5; 25 UG/1; UG/1
1 POWDER RESPIRATORY (INHALATION)
Qty: 60 EACH | Refills: 0 | Status: SHIPPED | OUTPATIENT
Start: 2024-05-13 | End: 2024-05-13

## 2024-05-13 RX ORDER — POTASSIUM CHLORIDE 20 MEQ/1
20 TABLET, EXTENDED RELEASE ORAL DAILY
Qty: 90 TABLET | Refills: 3 | Status: SHIPPED | OUTPATIENT
Start: 2024-05-13 | End: 2024-05-13

## 2024-05-13 RX ORDER — ALBUTEROL SULFATE 90 UG/1
2 AEROSOL, METERED RESPIRATORY (INHALATION) EVERY 4 HOURS PRN
Qty: 18 G | Refills: 0 | Status: SHIPPED | OUTPATIENT
Start: 2024-05-13 | End: 2024-05-13

## 2024-05-13 RX ORDER — ALBUTEROL SULFATE 90 UG/1
2 AEROSOL, METERED RESPIRATORY (INHALATION) EVERY 4 HOURS PRN
Qty: 18 G | Refills: 0 | Status: SHIPPED | OUTPATIENT
Start: 2024-05-13

## 2024-05-13 RX ADMIN — BUDESONIDE 1 MG: 0.5 INHALANT RESPIRATORY (INHALATION) at 06:55

## 2024-05-13 RX ADMIN — FUROSEMIDE 40 MG: 10 INJECTION, SOLUTION INTRAMUSCULAR; INTRAVENOUS at 08:23

## 2024-05-13 RX ADMIN — IPRATROPIUM BROMIDE AND ALBUTEROL SULFATE 3 ML: .5; 3 SOLUTION RESPIRATORY (INHALATION) at 06:55

## 2024-05-13 RX ADMIN — IPRATROPIUM BROMIDE AND ALBUTEROL SULFATE 3 ML: .5; 3 SOLUTION RESPIRATORY (INHALATION) at 13:08

## 2024-05-13 RX ADMIN — Medication 10 ML: at 08:21

## 2024-05-13 RX ADMIN — METOPROLOL TARTRATE 50 MG: 50 TABLET, FILM COATED ORAL at 08:23

## 2024-05-13 RX ADMIN — RIVAROXABAN 20 MG: 10 TABLET, FILM COATED ORAL at 17:14

## 2024-05-13 RX ADMIN — ACETAMINOPHEN 650 MG: 325 TABLET, FILM COATED ORAL at 10:56

## 2024-05-13 RX ADMIN — LEVOTHYROXINE SODIUM 88 MCG: 88 TABLET ORAL at 06:42

## 2024-05-13 RX ADMIN — Medication 1 TABLET: at 08:21

## 2024-05-13 RX ADMIN — FLUTICASONE PROPIONATE 2 SPRAY: 50 SPRAY, METERED NASAL at 08:21

## 2024-05-13 RX ADMIN — METHYLPREDNISOLONE SODIUM SUCCINATE 40 MG: 40 INJECTION, POWDER, FOR SOLUTION INTRAMUSCULAR; INTRAVENOUS at 08:21

## 2024-05-13 RX ADMIN — LEVOTHYROXINE SODIUM 200 MCG: 0.1 TABLET ORAL at 06:41

## 2024-05-13 RX ADMIN — CETIRIZINE HYDROCHLORIDE 10 MG: 10 TABLET, FILM COATED ORAL at 08:21

## 2024-05-13 NOTE — DISCHARGE SUMMARY
[facility]   DISCHARGE SUMMARY      Name:  Patsy Frazier   Age:  63 y.o.  Sex:  female  :  1960  MRN:  [id number]   Visit Number:  [id number]    Admission Date:  5/10/2024  Date of Discharge:  2024  Primary Care Physician:  Eva Washburn APRN    Important issues to note:    Start: lasix prednisone, unna boots  Stop: nothing  Follow up: PCP and podiatry pulmonology  Brief Summary: Presented with dyspnea due to progression of chronic respiratory failure. Was monitored inpatient. Had lower extremity wounds which will be followed by home health. Assessed for home oxygen. Stable for DC close follow up.  DME: Patient has a mobility limitation that significantly impairs their ability to participate in one or more mobility- related activities of daily living. The patient is able to safely use the walker. The functional mobility deficit can be sufficiently resolved by use of a walker.     Discharge Diagnoses:       Hypoxia    Tobacco abuse    COPD (chronic obstructive pulmonary disease)    Bilateral lower extremity edema        Problem List:     Active Hospital Problems    Diagnosis  POA    **Hypoxia [R09.02]  Yes    COPD (chronic obstructive pulmonary disease) [J44.9]  Yes    Bilateral lower extremity edema [R60.0]  Yes    Tobacco abuse [Z72.0]  Yes      Resolved Hospital Problems   No resolved problems to display.     Presenting Problem:    Chief Complaint   Patient presents with    Shortness of Breath      Consults:     Consulting Physician(s)                     None                 History of Presenting Illness:    63 female,  Doesn't wear oxygen. Tobacco use persistent. COPD. CHF documented but last echo normal. Aflutter s/p Ablation. History of LE edema since age 18. Reports multiple family members with same problem. Went to Dr. Juárez in [place]. Oxygen levels 69% in the clinic. Didn't know she was short of breath until she got there. Reports leg  swelling same as it always is. Has not been taking lasix because it makes her feel bad. Sleeps sitting upHasn't took it in months. Full code.     Pertinent findings: bnp 137, trop 6, cr 0.72, covid and flu ok, CXR clear, EKG NSR nonspecific Twave changes    Edited by: Bharath Segal, DO at 5/12/2024 0753    Hospital course:      Hypoxia    Tobacco abuse    COPD (chronic obstructive pulmonary disease)    Bilateral lower extremity edema    - Suspect due to progression of COPD.  Echo ok. Suspect venous stasis. Will give unna boots with home health and lasix.  Will need PFTs outpatient. Close follow up with Pulmonology. Podiatry for her toenails.        Vital Signs:    Temp:  [97.6 °F (36.4 °C)-98.1 °F (36.7 °C)] 97.8 °F (36.6 °C)  Heart Rate:  [61-77] 61  Resp:  [12-20] 12  BP: (115-134)/(50-68) 119/66    Physical Exam:    Constitutional: Awake, alert  Eyes: PERRLA, sclerae anicteric, no conjunctival injection  HENT: NCAT, mucous membranes moist  Neck: Supple, no thyromegaly, no lymphadenopathy, trachea midline  Respiratory: diminimished with basilar rales bilaterally, nonlabored respirations   Cardiovascular: RRR, no murmurs, rubs, or gallops, palpable pedal pulses bilaterally  Gastrointestinal: Positive bowel sounds, soft, nontender, nondistended  Musculoskeletal: 1+ bilateral ankle edema, no clubbing or cyanosis to extremities  Psychiatric: Appropriate affect, cooperative  Neurologic: Oriented x 3, speech clear  Skin:persistentweeping less red skin on bilateral lower extremities  Exam stable 5/13/24    Pertinent Lab Results:     Results from last 7 days   Lab Units 05/12/24  0627 05/11/24  0650 05/10/24  1258   SODIUM mmol/L 139 141 140   POTASSIUM mmol/L 4.3 4.3 4.7   CHLORIDE mmol/L 95* 98 101   CO2 mmol/L 34.5* 32.7* 30.0*   BUN mg/dL 29* 23 21   CREATININE mg/dL 0.76 0.73 0.72   CALCIUM mg/dL 9.3 9.1 9.3   BILIRUBIN mg/dL  --  0.4 0.4   ALK PHOS U/L  --  87 90   ALT (SGPT) U/L  --  19 20   AST (SGOT) U/L   --  15 12   GLUCOSE mg/dL 122* 130* 99     Results from last 7 days   Lab Units 05/12/24  0627 05/11/24  0650 05/10/24  1258   WBC 10*3/mm3 10.27 9.13 8.45   HEMOGLOBIN g/dL 14.7 15.6 15.9   HEMATOCRIT % 46.7* 49.9* 49.7*   PLATELETS 10*3/mm3 263 256 242         Results from last 7 days   Lab Units 05/10/24  1651 05/10/24  1258   HSTROP T ng/L 7 <6     Results from last 7 days   Lab Units 05/10/24  1258   PROBNP pg/mL 137.2                       Pertinent Radiology Results:    Imaging Results (All)       Procedure Component Value Units Date/Time    CT Chest Without Contrast Diagnostic [[id number]] Collected: 05/12/24 1022     Updated: 05/12/24 1036    Narrative:      PROCEDURE: CT CHEST WO CONTRAST DIAGNOSTIC-     HISTORY: hypoxia; J96.01-Acute respiratory failure with hypoxia     COMPARISON: None     FINDINGS: Axial CT images of the chest were obtained without contrast.  Coronal reformatted images were also obtained. This study was performed  with techniques to keep radiation doses as low as reasonably achievable,  (ALARA). Individualized dose reduction techniques using automated  exposure control or adjustment of mA and/or kV according to the patient  size were employed.      There is no evidence of mediastinal or hilar mass or adenopathy. No  axillary mass or adenopathy is identified. Mild atelectasis or scarring  is noted in the lung bases. No pulmonary mass or suspicious nodule is  identified. No localized pulmonary inflammatory process is identified.  No chest wall abnormality is identified. Limited images of the upper  abdomen reveal bilateral adrenal nodules, favor adenomas. Postoperative  changes are seen from cholecystectomy.          Impression:      Mild bibasilar pulmonary atelectasis or scarring.     Bilateral adrenal nodules with a nonspecific appearance but favor  adenomas.                 CTDI: 11.58 mGy  DLP:441.1 mGy.cm     This report was signed and finalized on 5/12/2024 10:34 AM by  Terrence Avalos MD.       XR Chest 1 View [[id number]] Collected: 05/10/24 1313     Updated: 05/10/24 1339    Narrative:      PROCEDURE: XR CHEST 1 VW-     HISTORY: SOA     COMPARISON: 10/5/2017.     FINDINGS: The heart is normal in size. The mediastinum is unremarkable.  The lungs are clear. There is no pneumothorax.  There are no acute  osseous abnormalities.       Impression:      No acute cardiopulmonary process.     Continued followup is recommended.              Images were reviewed, interpreted, and dictated by Dr. Malik Rodriguez MD  Transcribed by Bernabe Matos PA-C.     This report was signed and finalized on 5/10/2024 1:37 PM by Malik Rodriguez MD.               Echo:    Results for orders placed during the hospital encounter of 05/10/24    Adult Transthoracic Echo Complete W/ Cont if Necessary Per Protocol    Interpretation Summary    Left ventricular systolic function is normal. Calculated left ventricular EF = 58.1% Left ventricular ejection fraction appears to be 56 - 60%. Left ventricular diastolic function was normal.    The right ventricular cavity is borderline dilated with normal systolic function.    The right atrial cavity is mildly  dilated.    Mild tricuspid valve regurgitation is present. Estimated right ventricular systolic pressure from tricuspid regurgitation is mildly elevated (35-45 mmHg).    There is a trivial pericardial effusion.    Mild dilation of the aortic root is present. The aortic root measures 3.7 cm.    Condition on Discharge:      Stable.    Code status during the hospital stay:    Code Status and Medical Interventions:   Ordered at: 05/10/24 1613     Code Status (Patient has no pulse and is not breathing):    CPR (Attempt to Resuscitate)     Medical Interventions (Patient has pulse or is breathing):    Full Support     Discharge Disposition:        Discharge Medications:       Discharge Medications        Changes to Medications        Instructions Start Date   levothyroxine  200 MCG tablet  Commonly known as: SYNTHROID, LEVOTHROID  What changed: Another medication with the same name was removed. Continue taking this medication, and follow the directions you see here.   200 mcg, Oral, Every Morning      levothyroxine 88 MCG tablet  Commonly known as: SYNTHROID, LEVOTHROID  What changed: Another medication with the same name was removed. Continue taking this medication, and follow the directions you see here.   88 mcg, Oral, Daily, Take with the 200mg tablet for a total of 288mg per day      potassium chloride 20 MEQ CR tablet  Commonly known as: KLOR-CON M20  What changed:   how much to take  when to take this   20 mEq, Oral, Daily      predniSONE 20 MG tablet  Commonly known as: DELTASONE  What changed:   medication strength  how much to take  when to take this   40 mg, Oral, Daily             Continue These Medications        Instructions Start Date   albuterol sulfate  (90 Base) MCG/ACT inhaler  Commonly known as: PROVENTIL HFA;VENTOLIN HFA;PROAIR HFA   2 puffs, Inhalation, Every 4 Hours PRN      Anoro Ellipta 62.5-25 MCG/ACT aerosol powder  inhaler  Generic drug: Umeclidinium-Vilanterol   1 puff, Inhalation, Daily - RT      cetirizine 10 MG tablet  Commonly known as: zyrTEC   10 mg, Oral, Daily      fluticasone 50 MCG/ACT nasal spray  Commonly known as: FLONASE   2 sprays, Nasal, Daily      furosemide 80 MG tablet  Commonly known as: LASIX   80 mg, Oral, Daily      metoprolol tartrate 50 MG tablet  Commonly known as: LOPRESSOR   50 mg, Oral, 2 Times Daily      multivitamin with minerals tablet tablet   1 tablet, Oral, Daily      rivaroxaban 20 MG tablet  Commonly known as: XARELTO   20 mg, Oral, Daily With Dinner             Stop These Medications      diclofenac 75 MG EC tablet  Commonly known as: VOLTAREN     naproxen sodium 220 MG tablet  Commonly known as: ALEVE            Discharge Diet:     Diet Instructions       Advance Diet As Tolerated -Target Diet: heart healthy       Target Diet: heart healthy          Activity at Discharge:       Follow-up Appointments:    Additional Instructions for the Follow-ups that You Need to Schedule       Ambulatory Referral to Disease State Management   As directed      [facility] dept: [facility] [[id number]]   What program(s) are you referring for?: COPD   Follow-up needed: Yes        Ambulatory Referral to Home Health (Hospital)   As directed      Face to Face Visit Date: 5/12/2024   Follow-up provider for Plan of Care?: I treated the patient in an acute care facility and will not continue treatment after discharge.   Follow-up provider: THIERRY LAND [[id number]]   Reason/Clinical Findings: venous stasis dermatitis, weakness deconditioning   Describe mobility limitations that make leaving home difficult: she doesn't go out   Nursing/Therapeutic Services Requested: Skilled Nursing Physical Therapy Occupational Therapy   Skilled nursing orders: O2 instruction COPD management CHF management Cardiopulmonary assessments Wound care dressing/changes   Instructions: daily dressing changes to legs bilaterally aquacel AG over open areas ammonium lactate over in tact skin twice weekly   PT orders: Strengthening Home safety assessment   Occupational orders: Home safety assessment Strengthening   Frequency: 1 Week 1        Discharge Follow-up with PCP   As directed       Currently Documented PCP:    Thierry Land APRN    PCP Phone Number:    [phone]     Follow Up Details: 1 week               Follow-up Information       Thierry Land APRN .    Specialty: Family Medicine  Why: 1 week  Contact information:  [address] [place] [id number]  [phone]                           No future appointments.  Test Results Pending at Discharge:           Bharath Segal DO  05/13/24  10:24 EDT    Time: I spent 45 minutes on this discharge activity which included: face-to-face encounter with the patient, reviewing the data in  the system, coordination of the care with the nursing staff as well as consultants, documentation, and entering orders.     Dictated utilizing Dragon dictation.

## 2024-05-13 NOTE — THERAPY DISCHARGE NOTE
"Acute Care - Occupational Therapy Discharge  [facility]    Patient Name: Patsy Frazier  : 1960    MRN: [id number]                              Today's Date: 2024       Admit Date: 5/10/2024    Visit Dx:     ICD-10-CM ICD-9-CM   1. Acute hypoxic respiratory failure  J96.01 518.81   2. Chronic diastolic congestive heart failure  I50.32 428.32     428.0   3. Bilateral lower extremity edema  R60.0 782.3   4. Pulmonary emphysema, unspecified emphysema type  J43.9 492.8     Patient Active Problem List   Diagnosis    Atrial flutter with rapid ventricular response    Tobacco abuse    SVT (supraventricular tachycardia)    Atrial flutter    Acute hypoxic respiratory failure    Hypoxia    COPD (chronic obstructive pulmonary disease)    Bilateral lower extremity edema     Past Medical History:   Diagnosis Date    Arthritis     Asthma     CHF (congestive heart failure)     COPD (chronic obstructive pulmonary disease)     Hypothyroidism     Stroke     \"mini stroke\" at the age of 16, no deficits per pt report     SVT (supraventricular tachycardia)     prior hx of     Wears glasses      Past Surgical History:   Procedure Laterality Date    CARDIAC ELECTROPHYSIOLOGY PROCEDURE N/A 11/3/2017    Procedure: Ablation atrial flutter vs SVT;  Surgeon: Chavo Carr MD;  Location: [facility] INVASIVE LOCATION;  Service:     CHOLECYSTECTOMY      NECK SURGERY      TUBAL ABDOMINAL LIGATION        General Information       Row Name 24 1502          OT Time and Intention    Document Type discharge evaluation/summary  -SD     Mode of Treatment occupational therapy  -SD       Row Name 24 1502          General Information    Patient Profile Reviewed yes  -SD     Prior Level of Function independent:;all household mobility;ADL's  Lives alone, kids and grandkids assists with getting groceries and driving. No DME at home, sleeps on the couch  -SD     Existing Precautions/Restrictions oxygen therapy " device and L/min  -SD     Barriers to Rehab none identified  -SD       Row Name 05/13/24 1502          Living Environment    People in Home alone  -SD       Row Name 05/13/24 1502          Home Main Entrance    Number of Stairs, Main Entrance other (see comments)  14  -SD     Stair Railings, Main Entrance railings safe and in good condition  -SD       Row Name 05/13/24 1502          Stairs Within Home, Primary    Number of Stairs, Within Home, Primary none  -SD       Row Name 05/13/24 1502          Cognition    Orientation Status (Cognition) oriented x 4  -SD       Row Name 05/13/24 1502          Safety Issues, Functional Mobility    Safety Issues Affecting Function (Mobility) safety precautions follow-through/compliance  -SD     Impairments Affecting Function (Mobility) endurance/activity tolerance;shortness of breath  -SD               User Key  (r) = Recorded By, (t) = Taken By, (c) = Cosigned By      Initials Name Provider Type    SD Kim Sorenson OT Occupational Therapist                   Mobility/ADL's       Row Name 05/13/24 1503          Bed Mobility    Bed Mobility supine-sit;sit-supine  -SD     Supine-Sit [place] (Bed Mobility) modified independence  -SD     Sit-Supine [place] (Bed Mobility) modified independence  -SD     Assistive Device (Bed Mobility) head of bed elevated  -SD       Row Name 05/13/24 1503          Transfers    Transfers sit-stand transfer  -SD       Row Name 05/13/24 1503          Sit-Stand Transfer    Sit-Stand [place] (Transfers) standby assist  -SD       [facility] Name 05/13/24 1503          Functional Mobility    Functional Mobility- Ind. Level standby assist  -SD     Functional Mobility-Distance (Feet) 70  -SD     Functional Mobility- Safety Issues supplemental O2  -SD       Row Name 05/13/24 1503          Activities of Daily Living    BADL Assessment/Intervention bathing;upper body dressing;lower body dressing;grooming;feeding;toileting  -SD       Row Name 05/13/24  1503          Bathing Assessment/Intervention    [place] Level (Bathing) supervision;standby assist  -SD     Comment, (Bathing) would benefit from a tub bench and long handled sponge for energy conservation and safety  -SD       Row Name 05/13/24 1503          Upper Body Dressing Assessment/Training    [place] Level (Upper Body Dressing) set up  -SD       Row Name 05/13/24 1503          Lower Body Dressing Assessment/Training    [place] Level (Lower Body Dressing) pants/bottoms;supervision  -SD     Comment, (Lower Body Dressing) doesn't wear socks  -SD       Row Name 05/13/24 1503          Grooming Assessment/Training    [place] Level (Grooming) set up  -SD       Row Name 05/13/24 1503          Self-Feeding Assessment/Training    [place] Level (Feeding) set up  -SD       Row Name 05/13/24 1503          Toileting Assessment/Training    [place] Level (Toileting) supervision  -SD               User Key  (r) = Recorded By, (t) = Taken By, (c) = Cosigned By      Initials Name Provider Type    Kim Hamilton OT Occupational Therapist                   Obj/Interventions       Row Name 05/13/24 1506          Range of Motion Comprehensive    General Range of Motion bilateral upper extremity ROM WFL  -SD       Row Name 05/13/24 1506          Strength Comprehensive (MMT)    General Manual Muscle Testing (MMT) Assessment upper extremity strength deficits identified  -SD     Comment, General Manual Muscle Testing (MMT) Assessment UB 4/5  -SD               User Key  (r) = Recorded By, (t) = Taken By, (c) = Cosigned By      Initials Name Provider Type    Kim Hamilton OT Occupational Therapist                   Goals/Plan    No documentation.                  Clinical Impression       Row Name 05/13/24 1506          Pain Assessment    Pretreatment Pain Rating 0/10 - no pain  -SD     Posttreatment Pain Rating 0/10 - no pain  -SD       Row Name 05/13/24 1506          Plan of Care Review     Plan of Care Reviewed With patient  -SD     Progress no change  -SD     Outcome Evaluation OT eval completed. Patient is sitting EOB, respiratory therapist present. Patient placed on 4L O2 by RT. Patient denies pain, is Ox4. Patient lives alone in anapartment with 14 steps to enter. Patient reports rails are in good condition and family will assist with getting into the home. Patient is ind with HH mobility and ADLs. Patient assists with getting groceries and driving. Patient performed sit to stand with SBA, walked 70' without AD and SBA. Patient is able to perform ADLs with sup overall. Patient would benefit from  services, a rollator, a tub bench and long handled sponge.  -SD       Row Name 05/13/24 1502          Therapy Assessment/Plan (OT)    Patient/Family Therapy Goal Statement (OT) home  -SD     Rehab Potential (OT) good, to achieve stated therapy goals  -SD     Criteria for Skilled Therapeutic Interventions Met (OT) no problems identified which require skilled intervention  -SD     Therapy Frequency (OT) evaluation only  -SD       Row Name 05/13/24 1504          Therapy Plan Review/Discharge Plan (OT)    Equipment Needs Upon Discharge (OT) tub bench;walker, rolling  -SD     Anticipated Discharge Disposition (OT) home with assist;home with home health  -SD       Row Name 05/13/24 1506          Vital Signs    O2 Delivery Pre Treatment supplemental O2  -SD     Intra SpO2 (%) 87  -SD     O2 Delivery Intra Treatment supplemental O2  -SD     Post SpO2 (%) 90  -SD     O2 Delivery Post Treatment supplemental O2  -SD       Row Name 05/13/24 1503          Positioning and Restraints    Pre-Treatment Position in bed  -SD     Post Treatment Position chair  -SD     In Chair sitting;call light within reach;encouraged to call for assist  -SD               User Key  (r) = Recorded By, (t) = Taken By, (c) = Cosigned By      Initials Name Provider Type    Kim Hamilton, OT Occupational Therapist                    Outcome Measures       Row Name 05/13/24 1509          How much help from another is currently needed...    Putting on and taking off regular lower body clothing? 4  -SD     Bathing (including washing, rinsing, and drying) 3  -SD     Toileting (which includes using toilet bed pan or urinal) 4  -SD     Putting on and taking off regular upper body clothing 4  -SD     Taking care of personal grooming (such as brushing teeth) 4  -SD     Eating meals 4  -SD     AM-PAC 6 Clicks Score (OT) 23  -SD       Row Name 05/13/24 0815          How much help from another person do you currently need...    Turning from your back to your side while in flat bed without using bedrails? 4  -CW     Moving from lying on back to sitting on the side of a flat bed without bedrails? 4  -CW     Moving to and from a bed to a chair (including a wheelchair)? 4  -CW     Standing up from a chair using your arms (e.g., wheelchair, bedside chair)? 4  -CW     Climbing 3-5 steps with a railing? 3  -CW     To walk in hospital room? 3  -CW     AM-PAC 6 Clicks Score (PT) 22  -CW     Highest Level of Mobility Goal 7 --> Walk 25 feet or more  -CW       Row Name 05/13/24 1509          Functional Assessment    Outcome Measure Options AM-PAC 6 Clicks Daily Activity (OT)  -SD               User Key  (r) = Recorded By, (t) = Taken By, (c) = Cosigned By      Initials Name Provider Type    Kim Hamilton OT Occupational Therapist    CW Karen Joshi, RN Registered Nurse                  Occupational Therapy Education       Title: PT OT SLP Therapies (In Progress)       Topic: Occupational Therapy (In Progress)       Point: ADL training (Done)       Description:   Instruct learner(s) on proper safety adaptation and remediation techniques during self care or transfers.   Instruct in proper use of assistive devices.                  Learning Progress Summary             Patient Acceptance, E,TB, VU by SD at 5/13/2024 1510    Comment: OT POC                          Point: Home exercise program (Not Started)       Description:   Instruct learner(s) on appropriate technique for monitoring, assisting and/or progressing therapeutic exercises/activities.                  Learner Progress:  Not documented in this visit.              Point: Precautions (Not Started)       Description:   Instruct learner(s) on prescribed precautions during self-care and functional transfers.                  Learner Progress:  Not documented in this visit.              Point: Body mechanics (Not Started)       Description:   Instruct learner(s) on proper positioning and spine alignment during self-care, functional mobility activities and/or exercises.                  Learner Progress:  Not documented in this visit.                              User Key       Initials Effective Dates Name Provider Type Discipline    SD 06/16/21 -  Kim Sorenson OT Occupational Therapist OT                  OT Recommendation and Plan  Therapy Frequency (OT): evaluation only  Plan of Care Review  Plan of Care Reviewed With: patient  Progress: no change  Outcome Evaluation: OT eval completed. Patient is sitting EOB, respiratory therapist present. Patient placed on 4L O2 by RT. Patient denies pain, is Ox4. Patient lives alone in anapartment with 14 steps to enter. Patient reports rails are in good condition and family will assist with getting into the home. Patient is ind with HH mobility and ADLs. Patient assists with getting groceries and driving. Patient performed sit to stand with SBA, walked 70' without AD and SBA. Patient is able to perform ADLs with sup overall. Patient would benefit from  services, a rollator, a tub bench and long handled sponge.  Plan of Care Reviewed With: patient  Outcome Evaluation: OT eval completed. Patient is sitting EOB, respiratory therapist present. Patient placed on 4L O2 by RT. Patient denies pain, is Ox4. Patient lives alone in anapartment with 14 steps to enter. Patient  reports rails are in good condition and family will assist with getting into the home. Patient is ind with HH mobility and ADLs. Patient assists with getting groceries and driving. Patient performed sit to stand with SBA, walked 70' without AD and SBA. Patient is able to perform ADLs with sup overall. Patient would benefit from  services, a rollator, a tub bench and long handled sponge.     Time Calculation:   Evaluation Complexity (OT)  Review Occupational Profile/Medical/Therapy History Complexity: brief/low complexity  Assessment, Occupational Performance/Identification of Deficit Complexity: 1-3 performance deficits  Clinical Decision Making Complexity (OT): problem focused assessment/low complexity  Overall Complexity of Evaluation (OT): low complexity     Time Calculation- OT       Row Name 05/13/24 1510             Time Calculation- OT    OT Start Time 1336  -SD      OT Received On 05/13/24  -SD      OT Goal Re-Cert Due Date 05/23/24  -SD         Untimed Charges    OT Eval/Re-eval Minutes 45  -SD         Total Minutes    Untimed Charges Total Minutes 45  -SD       Total Minutes 45  -SD                User Key  (r) = Recorded By, (t) = Taken By, (c) = Cosigned By      Initials Name Provider Type    Kim Hamilton OT Occupational Therapist                  Therapy Charges for Today       Code Description Service Date Service Provider Modifiers Qty    [id number]  OT EVAL LOW COMPLEXITY 3 5/13/2024 Kim Sorenson OT GO 1               OT Discharge Summary  Anticipated Discharge Disposition (OT): home with assist, home with home health    Kim Sorenson OT  5/13/2024

## 2024-05-13 NOTE — CASE MANAGEMENT/SOCIAL WORK
Case Management Discharge Note      Final Note: Pt discharging home via private car by her daughter. [employer] delivered portable oxygen tank to pts room for ride home and will deliver concentrator and rollator to her home tonight. Darnell Merino will see pt tomorrow. Pt updated.    Provided Post Acute Provider List?: Yes  Post Acute Provider List: Home Health  Provided Post Acute Provider Quality & Resource List?: Yes  Post Acute Provider Quality and Resource List: Home Health  Delivered To: Patient  Method of Delivery: In person    Selected Continued Care - Admitted Since 5/10/2024       Destination    No services have been selected for the patient.                Durable Medical Equipment Coordination complete.      Service Provider Selected Services Address Phone Fax Patient Preferred    [facility] ,  Oxygen Equipment and Accessories [address] [place] [address] [address] [facility] [address] [phone] --              Dialysis/Infusion    No services have been selected for the patient.                Home Medical Care Coordination complete.      Service Provider Selected Services Address Phone Fax Patient Preferred    [facility] ,  Home Rehabilitation ,  [facility] [address] [phone] --              Therapy    No services have been selected for the patient.                Community Resources    No services have been selected for the patient.                [facility] & [facility] [place] [facility]    No services have been selected for the patient.                    Transportation Services  Private: Car    Final Discharge Disposition Code: 06 - home with home health care

## 2024-05-13 NOTE — PLAN OF CARE
Goal Outcome Evaluation:   Patient being discharged home with home health

## 2024-05-13 NOTE — PROGRESS NOTES
"Enter Query Response Below      Query Response: Chronic hypoxic respiratory failure   Patient went home on 4 liters which is her new baseline. Likely lived with chronic hypoxia for which she was unaware prior to admission             If applicable, please update the problem list.     Patient: Patsy Frazier        : 1960  Account: [id number]           Admit Date:         How to Respond to this query:       a. Click New Note     b. Answer query within the yellow box.                c. Update the Problem List, if applicable.      If you have any questions about this query contact me at: [email or web address]    Dr. Segal,     Patient admitted with COPD/asthma, CHF.  Oxygen saturations 80% on 2L, 90% on 4L, respiratory rate 12-20, 22x1. Per H&P \"Oxygen levels 69% in the clinic.\" Documentation of shortness of breath, normal effort, no distress.  Per ED note patient with acute hypoxic respiratory failure, discharge summary states \"progression of chronic respiratory failure\". Assessing for home oxygen needs.     Please clarify conflicting documentation as:  Acute hypoxic respiratory failure  Chronic hypoxic respiratory failure  Acute on chronic hypoxic respiratory failure  Other- specify__________  Unable to determine    By submitting this query, we are merely seeking further clarification of documentation to accurately reflect all conditions that you are monitoring, evaluating, treating or that extend the hospitalization or utilize additional resources of care. Please utilize your independent clinical judgment when addressing the question(s) above.     This query and your response, once completed, will be entered into the legal medical record.    Sincerely,  Orly Nguyen RN BSN  Clinical Documentation Integrity Program   "

## 2024-05-13 NOTE — OUTREACH NOTE
Prep Survey      Flowsheet Row Responses   [facility]? [facility]   Is LACE score < 7 ? No   Eligibility Readm Mgmt   Discharge diagnosis Hypoxia   Does the patient have one of the following disease processes/diagnoses(primary or secondary)? Other   Does the patient have Home health ordered? Yes   What is the Home health agency?  [facility]   Is there a DME ordered? Yes   What DME was ordered? [facility]   Prep survey completed? Yes            YELENA SHEN - Registered Nurse

## 2024-05-13 NOTE — PROGRESS NOTES
Exercise Oximetry    Patient Name:Patsy Frazier   MRN: [id number]   Date: 05/13/24             ROOM AIR BASELINE   SpO2% 86   Heart Rate 72   Blood Pressure      EXERCISE ON ROOM AIR SpO2% EXERCISE ON O2 @  LPM SpO2%   1 MINUTE  1 MINUTE 2 88   2 MINUTES  2 MINUTES 3 90   3 MINUTES  3 MINUTES 3 88   4 MINUTES  4 MINUTES 4 91   5 MINUTES  5 MINUTES 4 90   6 MINUTES  6 MINUTES  91              Distance Walked   Distance Walked   Dyspnea (Mariana Scale)   Dyspnea (Mariana Scale) 3   Fatigue (Mariana Scale)   Fatigue (Mariana Scale)4   SpO2% Post Exercise   SpO2% Post Exercise   HR Post Exercise   HR Post Exercise 90   Time to Recovery   Time to Recovery     Comments: pt requires 3LPM at rest, pt requires 4LPM on exertion.

## 2024-05-13 NOTE — CASE MANAGEMENT/SOCIAL WORK
DCP; Pt plans to discharge to granddaughters today and return home tomorrow. No preference for . [facility] could not accept, referral sent to [facility], awaiting response. CM continues to follow.

## 2024-05-13 NOTE — PROGRESS NOTES
Enter Query Response Below      Query Response: Morbid obesity              If applicable, please update the problem list.     Patient: Patsy Frazier        : 1960  Account: [id number]           Admit Date:         How to Respond to this query:       a. Click New Note     b. Answer query within the yellow box.                c. Update the Problem List, if applicable.      If you have any questions about this query contact me at: [email or web address]    Dr. Segal,     Patient admitted with COPD/asthma, CHF, respiratory failure.  BMI noted 47.9 on admission. Diet orders for Healthy Heart (2-3 Na+).     Please clarify if patient treated/monitored for one or more of the following:  Obesity  Morbid obesity  Overweight  Other- specify __________  Unable to determine    Definition- “Obesity may be classified as morbid (BMI ? 40) or severe (BMI 35.0-39.9) with at least one significant obesity-related comorbidity such as type 2 diabetes, hypertension, sleep apnea, etc.” [employer]o [employer] ()    By submitting this query, we are merely seeking further clarification of documentation to accurately reflect all conditions that you are monitoring, evaluating, treating or that extend the hospitalization or utilize additional resources of care. Please utilize your independent clinical judgment when addressing the question(s) above.     This query and your response, once completed, will be entered into the legal medical record.    Sincerely,  Orly Nguyen RN BSN  Clinical Documentation Integrity Program

## 2024-05-13 NOTE — NURSING NOTE
Patient being discharged home. Discharge instructions reviewed, medications delivered, informed of follow up appointments, oxygen delivered, and IV access removed. Patient verbalizes understanding of discharge teaching.

## 2024-05-13 NOTE — PLAN OF CARE
Goal Outcome Evaluation:  Plan of Care Reviewed With: patient        Progress: no change  Outcome Evaluation: OT eval completed. Patient is sitting EOB, respiratory therapist present. Patient placed on 4L O2 by RT. Patient denies pain, is Ox4. Patient lives alone in anapartment with 14 steps to enter. Patient reports rails are in good condition and family will assist with getting into the home. Patient is ind with HH mobility and ADLs. Patient assists with getting groceries and driving. Patient performed sit to stand with SBA, walked 70' without AD and SBA. Patient is able to perform ADLs with sup overall. Patient would benefit from  services, a rollator, a tub bench and long handled sponge.      Anticipated Discharge Disposition (OT): home with assist, home with home health

## 2024-05-13 NOTE — NURSING NOTE
Seen for wound consult. Sitting on side of bed for assessment and care. Pleasant and cooperative with assessment. Karen NEGRETE assisted. Preparing for discharge.   Bilateral lower legs edematous, ulcerations on calves and surrounding red, dry, flaking skin. Applied barrier cream and nourishing skin cream to intact skin, Placed multidex powder, petroleum gauze, basic foam over wounds and secured with rolled gauze to be changed daily while in hospital. Would be candidate for unna boots or 3 layer wraps at home. Recommend home health clean legs with soap and water, then place ammonium lactate lotion on intact skin, aquacel ag to open areas, then wrap with unna boots or 3 layer wrap from base of toes to base of knee twice weekly.   Staff to contact provider and re-consult wound nurse for new skin issues or lack of improvement with current orders. Thank you for the consult. If you have questions or concerns do not hesitate to contact me.

## 2024-05-14 NOTE — PAYOR COMM NOTE
"To:  [facility]  From: Michelle Lucero RN  Phone: [phone]  Fax: [phone]  NPI: [id number]  TIN: [id number]  Member ID: [id number]   MRN: [id number]    Patsy Frazier (63 y.o. Female)       Date of Birth   1960    Social Security Number       Address   [address] [facility] [address]    Home Phone   [phone]    MRN   [id number]       Sabianist   Pentecostalism    Marital Status                               Admission Date   5/10/24    Admission Type   Emergency    Admitting Provider   Bharath Segal DO    Attending Provider       [facility]       Discharge Date   5/13/2024    Discharge Disposition   Home-Health Care [facility]    Discharge Destination                                 Attending Provider: (none)   Allergies: No Known Allergies    Isolation: None   Infection: None   Code Status: Prior    Ht: 172.7 cm (67.99\")   Wt: 133 kg (293 lb 10.4 oz)    Admission Cmt: None   Principal Problem: Hypoxia [R09.02]                   Active Insurance as of 5/10/2024       Primary Coverage       Payor Plan Insurance Group Employer/Plan Group    [facility] MEDICARE REPLACEMENT [facility] MED ADV SNP HMO [id number]       Payor Plan Address Payor Plan Phone Number Payor Plan Fax Number Effective Dates    [address] [address]   1/1/2024 - None Entered    [facility] [id number]         Subscriber Name Subscriber Birth Date Member ID       PATSY FRAZIER 1960 [id number]               Secondary Coverage       Payor Plan Insurance Group Employer/Plan Group    [facility] MEDICAID        Payor Plan Address Payor Plan Phone Number Payor Plan Fax Number Effective Dates    [address] [address] [phone]  9/28/2017 - None Entered    [facility] [id number]         Subscriber Name Subscriber Birth Date Member ID       PATSY FRAZIER 1960 [id number]                     Emergency Contacts        (Rel.) Home Phone " Work Phone Mobile Phone    Fani Alegria (Daughter) [phone] -- --    DAVID ALEGRIA (Son) [phone] -- --                 Discharge Summary        Bharath Segal,  at 24 1024              [facility]   DISCHARGE SUMMARY      Name:  Patsy Frazier   Age:  63 y.o.  Sex:  female  :  1960  MRN:  [id number]   Visit Number:  [id number]    Admission Date:  5/10/2024  Date of Discharge:  2024  Primary Care Physician:  Eva Washburn APRN    Important issues to note:    Start: lasix prednisone, unna boots  Stop: nothing  Follow up: PCP and podiatry pulmonology  Brief Summary: Presented with dyspnea due to progression of chronic respiratory failure. Was monitored inpatient. Had lower extremity wounds which will be followed by home health. Assessed for home oxygen. Stable for DC close follow up.  DME: Patient has a mobility limitation that significantly impairs their ability to participate in one or more mobility- related activities of daily living. The patient is able to safely use the walker. The functional mobility deficit can be sufficiently resolved by use of a walker.     Discharge Diagnoses:       Hypoxia    Tobacco abuse    COPD (chronic obstructive pulmonary disease)    Bilateral lower extremity edema        Problem List:     Active Hospital Problems    Diagnosis  POA    **Hypoxia [R09.02]  Yes    COPD (chronic obstructive pulmonary disease) [J44.9]  Yes    Bilateral lower extremity edema [R60.0]  Yes    Tobacco abuse [Z72.0]  Yes      Resolved Hospital Problems   No resolved problems to display.     Presenting Problem:    Chief Complaint   Patient presents with    Shortness of Breath      Consults:     Consulting Physician(s)                     None                 History of Presenting Illness:    63 female,  Doesn't wear oxygen. Tobacco use persistent. COPD. CHF documented but last echo normal. Aflutter s/p Ablation. History of LE edema since age  18. Reports multiple family members with same problem. Went to Dr. Juárez in [place]. Oxygen levels 69% in the clinic. Didn't know she was short of breath until she got there. Reports leg swelling same as it always is. Has not been taking lasix because it makes her feel bad. Sleeps sitting upHasn't took it in months. Full code.     Pertinent findings: bnp 137, trop 6, cr 0.72, covid and flu ok, CXR clear, EKG NSR nonspecific Twave changes    Edited by: Bharath Segal DO at 5/12/2024 0753    Hospital course:      Hypoxia    Tobacco abuse    COPD (chronic obstructive pulmonary disease)    Bilateral lower extremity edema    - Suspect due to progression of COPD.  Echo ok. Suspect venous stasis. Will give unna boots with home health and lasix.  Will need PFTs outpatient. Close follow up with Pulmonology. Podiatry for her toenails.        Vital Signs:    Temp:  [97.6 °F (36.4 °C)-98.1 °F (36.7 °C)] 97.8 °F (36.6 °C)  Heart Rate:  [61-77] 61  Resp:  [12-20] 12  BP: (115-134)/(50-68) 119/66    Physical Exam:    Constitutional: Awake, alert  Eyes: PERRLA, sclerae anicteric, no conjunctival injection  HENT: NCAT, mucous membranes moist  Neck: Supple, no thyromegaly, no lymphadenopathy, trachea midline  Respiratory: diminimished with basilar rales bilaterally, nonlabored respirations   Cardiovascular: RRR, no murmurs, rubs, or gallops, palpable pedal pulses bilaterally  Gastrointestinal: Positive bowel sounds, soft, nontender, nondistended  Musculoskeletal: 1+ bilateral ankle edema, no clubbing or cyanosis to extremities  Psychiatric: Appropriate affect, cooperative  Neurologic: Oriented x 3, speech clear  Skin:persistentweeping less red skin on bilateral lower extremities  Exam stable 5/13/24    Pertinent Lab Results:     Results from last 7 days   Lab Units 05/12/24  0627 05/11/24  0650 05/10/24  1258   SODIUM mmol/L 139 141 140   POTASSIUM mmol/L 4.3 4.3 4.7   CHLORIDE mmol/L 95* 98 101   CO2 mmol/L 34.5* 32.7*  30.0*   BUN mg/dL 29* 23 21   CREATININE mg/dL 0.76 0.73 0.72   CALCIUM mg/dL 9.3 9.1 9.3   BILIRUBIN mg/dL  --  0.4 0.4   ALK PHOS U/L  --  87 90   ALT (SGPT) U/L  --  19 20   AST (SGOT) U/L  --  15 12   GLUCOSE mg/dL 122* 130* 99     Results from last 7 days   Lab Units 05/12/24  0627 05/11/24  0650 05/10/24  1258   WBC 10*3/mm3 10.27 9.13 8.45   HEMOGLOBIN g/dL 14.7 15.6 15.9   HEMATOCRIT % 46.7* 49.9* 49.7*   PLATELETS 10*3/mm3 263 256 242         Results from last 7 days   Lab Units 05/10/24  1651 05/10/24  1258   HSTROP T ng/L 7 <6     Results from last 7 days   Lab Units 05/10/24  1258   PROBNP pg/mL 137.2                       Pertinent Radiology Results:    Imaging Results (All)       Procedure Component Value Units Date/Time    CT Chest Without Contrast Diagnostic [[id number]] Collected: 05/12/24 1022     Updated: 05/12/24 1036    Narrative:      PROCEDURE: CT CHEST WO CONTRAST DIAGNOSTIC-     HISTORY: hypoxia; J96.01-Acute respiratory failure with hypoxia     COMPARISON: None     FINDINGS: Axial CT images of the chest were obtained without contrast.  Coronal reformatted images were also obtained. This study was performed  with techniques to keep radiation doses as low as reasonably achievable,  (ALARA). Individualized dose reduction techniques using automated  exposure control or adjustment of mA and/or kV according to the patient  size were employed.      There is no evidence of mediastinal or hilar mass or adenopathy. No  axillary mass or adenopathy is identified. Mild atelectasis or scarring  is noted in the lung bases. No pulmonary mass or suspicious nodule is  identified. No localized pulmonary inflammatory process is identified.  No chest wall abnormality is identified. Limited images of the upper  abdomen reveal bilateral adrenal nodules, favor adenomas. Postoperative  changes are seen from cholecystectomy.          Impression:      Mild bibasilar pulmonary atelectasis or scarring.     Bilateral  adrenal nodules with a nonspecific appearance but favor  adenomas.                 CTDI: 11.58 mGy  DLP:441.1 mGy.cm     This report was signed and finalized on 5/12/2024 10:34 AM by Terrence Avalos MD.       XR Chest 1 View [[id number]] Collected: 05/10/24 1313     Updated: 05/10/24 1339    Narrative:      PROCEDURE: XR CHEST 1 VW-     HISTORY: SOA     COMPARISON: 10/5/2017.     FINDINGS: The heart is normal in size. The mediastinum is unremarkable.  The lungs are clear. There is no pneumothorax.  There are no acute  osseous abnormalities.       Impression:      No acute cardiopulmonary process.     Continued followup is recommended.              Images were reviewed, interpreted, and dictated by Dr. Malik Rodriguez MD  Transcribed by Bernabe Matos PA-C.     This report was signed and finalized on 5/10/2024 1:37 PM by Malik Rodriguez MD.               Echo:    Results for orders placed during the hospital encounter of 05/10/24    Adult Transthoracic Echo Complete W/ Cont if Necessary Per Protocol    Interpretation Summary    Left ventricular systolic function is normal. Calculated left ventricular EF = 58.1% Left ventricular ejection fraction appears to be 56 - 60%. Left ventricular diastolic function was normal.    The right ventricular cavity is borderline dilated with normal systolic function.    The right atrial cavity is mildly  dilated.    Mild tricuspid valve regurgitation is present. Estimated right ventricular systolic pressure from tricuspid regurgitation is mildly elevated (35-45 mmHg).    There is a trivial pericardial effusion.    Mild dilation of the aortic root is present. The aortic root measures 3.7 cm.    Condition on Discharge:      Stable.    Code status during the hospital stay:    Code Status and Medical Interventions:   Ordered at: 05/10/24 1613     Code Status (Patient has no pulse and is not breathing):    CPR (Attempt to Resuscitate)     Medical Interventions (Patient has pulse or is  breathing):    Full Support     Discharge Disposition:        Discharge Medications:       Discharge Medications        Changes to Medications        Instructions Start Date   levothyroxine 200 MCG tablet  Commonly known as: SYNTHROID, LEVOTHROID  What changed: Another medication with the same name was removed. Continue taking this medication, and follow the directions you see here.   200 mcg, Oral, Every Morning      levothyroxine 88 MCG tablet  Commonly known as: SYNTHROID, LEVOTHROID  What changed: Another medication with the same name was removed. Continue taking this medication, and follow the directions you see here.   88 mcg, Oral, Daily, Take with the 200mg tablet for a total of 288mg per day      potassium chloride 20 MEQ CR tablet  Commonly known as: KLOR-CON M20  What changed:   how much to take  when to take this   20 mEq, Oral, Daily      predniSONE 20 MG tablet  Commonly known as: DELTASONE  What changed:   medication strength  how much to take  when to take this   40 mg, Oral, Daily             Continue These Medications        Instructions Start Date   albuterol sulfate  (90 Base) MCG/ACT inhaler  Commonly known as: PROVENTIL HFA;VENTOLIN HFA;PROAIR HFA   2 puffs, Inhalation, Every 4 Hours PRN      Anoro Ellipta 62.5-25 MCG/ACT aerosol powder  inhaler  Generic drug: Umeclidinium-Vilanterol   1 puff, Inhalation, Daily - RT      cetirizine 10 MG tablet  Commonly known as: zyrTEC   10 mg, Oral, Daily      fluticasone 50 MCG/ACT nasal spray  Commonly known as: FLONASE   2 sprays, Nasal, Daily      furosemide 80 MG tablet  Commonly known as: LASIX   80 mg, Oral, Daily      metoprolol tartrate 50 MG tablet  Commonly known as: LOPRESSOR   50 mg, Oral, 2 Times Daily      multivitamin with minerals tablet tablet   1 tablet, Oral, Daily      rivaroxaban 20 MG tablet  Commonly known as: XARELTO   20 mg, Oral, Daily With Dinner             Stop These Medications      diclofenac 75 MG EC tablet  Commonly  known as: VOLTAREN     naproxen sodium 220 MG tablet  Commonly known as: ALEVE            Discharge Diet:     Diet Instructions       Advance Diet As Tolerated -Target Diet: heart healthy      Target Diet: heart healthy          Activity at Discharge:       Follow-up Appointments:    Additional Instructions for the Follow-ups that You Need to Schedule       Ambulatory Referral to Disease State Management   As directed      To dept: [facility] [[id number]]   What program(s) are you referring for?: COPD   Follow-up needed: Yes        Ambulatory Referral to Home Health (Hospital)   As directed      Face to Face Visit Date: 5/12/2024   Follow-up provider for Plan of Care?: I treated the patient in an acute care facility and will not continue treatment after discharge.   Follow-up provider: THIERRY LAND [[id number]]   Reason/Clinical Findings: venous stasis dermatitis, weakness deconditioning   Describe mobility limitations that make leaving home difficult: she doesn't go out   Nursing/Therapeutic Services Requested: Skilled Nursing Physical Therapy Occupational Therapy   Skilled nursing orders: O2 instruction COPD management CHF management Cardiopulmonary assessments Wound care dressing/changes   Instructions: daily dressing changes to legs bilaterally aquacel AG over open areas ammonium lactate over in tact skin twice weekly   PT orders: Strengthening Home safety assessment   Occupational orders: Home safety assessment Strengthening   Frequency: 1 Week 1        Discharge Follow-up with PCP   As directed       Currently Documented PCP:    Thierry Land APRN    PCP Phone Number:    [phone]     Follow Up Details: 1 week               Follow-up Information       Thierry Land APRN .    Specialty: Family Medicine  Why: 1 week  Contact information:  [address] [place], [place] [address] [phone]                           No future appointments.  Test Results Pending at  Discharge:           Bharath Segal DO  05/13/24  10:24 EDT    Time: I spent 45 minutes on this discharge activity which included: face-to-face encounter with the patient, reviewing the data in the system, coordination of the care with the nursing staff as well as consultants, documentation, and entering orders.     Dictated utilizing Dragon dictation.        Electronically signed by Bharath Segal DO at 05/13/24 5489

## 2024-05-14 NOTE — PROGRESS NOTES
"Enter Query Response Below      Query Response: Other- specify  venous stasis             If applicable, please update the problem list.     Patient: Patsy Frazier        : 1960  Account: [id number]           Admit Date: 5/10/2024        How to Respond to this query:       a. Click New Note     b. Answer query within the yellow box.                c. Update the Problem List, if applicable.      If you have any questions about this query contact me at: [email or web address]    Dr. Segal,    Patient admitted with COPD/asthma, history of CHF.  ECHO 5/10/24 EF 58.1%. CXR on admission shows no acute process, proBNP 137.2.  Treatment includes IV and PO lasix, metolazone.  Per progress notes and discharge summary \"CHF documented but last echo normal\" and \"Suspect combination of CHF and COPD\" and \"Reports leg swelling same as it always is\".      Please clarify the type of heart failure treated/monitored:  Chronic diastolic heart failure  Other- specify_________  Unable to determine    By submitting this query, we are merely seeking further clarification of documentation to accurately reflect all conditions that you are monitoring, evaluating, treating or that extend the hospitalization or utilize additional resources of care. Please utilize your independent clinical judgment when addressing the question(s) above.     This query and your response, once completed, will be entered into the legal medical record.    Sincerely,  Orly Nguyen RN BSN  Clinical Documentation Integrity Program   "

## 2024-05-21 ENCOUNTER — READMISSION MANAGEMENT (OUTPATIENT)
Dept: CALL CENTER | Facility: HOSPITAL | Age: 64
End: 2024-05-21
Payer: MEDICARE

## 2024-05-21 NOTE — OUTREACH NOTE
Medical Week 2 Survey      Flowsheet Row Responses   [facility] facility patient discharged from? [facility]   Does the patient have one of the following disease processes/diagnoses(primary or secondary)? Other   Week 2 attempt successful? No   Unsuccessful attempts Attempt 1            Libby Strickland Registered Nurse

## 2024-05-24 ENCOUNTER — READMISSION MANAGEMENT (OUTPATIENT)
Dept: CALL CENTER | Facility: HOSPITAL | Age: 64
End: 2024-05-24
Payer: MEDICARE

## 2024-05-24 NOTE — OUTREACH NOTE
Medical Week 2 Survey      Flowsheet Row Responses   [facility] facility patient discharged from? [facility]   Does the patient have one of the following disease processes/diagnoses(primary or secondary)? Other   Week 2 attempt successful? No   Unsuccessful attempts Attempt 2            Alen AMEZQUITA - Registered Nurse

## 2024-06-05 ENCOUNTER — READMISSION MANAGEMENT (OUTPATIENT)
Dept: CALL CENTER | Facility: HOSPITAL | Age: 64
End: 2024-06-05
Payer: MEDICARE

## 2024-06-05 NOTE — OUTREACH NOTE
Medical Week 3 Survey      Flowsheet Row Responses   [facility] patient discharged from? [facility]   Does the patient have one of the following disease processes/diagnoses(primary or secondary)? Other   Week 3 attempt successful? No   Unsuccessful attempts Attempt 1   Revoke Decline to participate            Anyi ALLEN - Licensed Nurse

## 2025-04-10 ENCOUNTER — APPOINTMENT (OUTPATIENT)
Dept: ULTRASOUND IMAGING | Facility: HOSPITAL | Age: 65
End: 2025-04-10
Payer: MEDICARE

## 2025-04-10 ENCOUNTER — APPOINTMENT (OUTPATIENT)
Dept: GENERAL RADIOLOGY | Facility: HOSPITAL | Age: 65
End: 2025-04-10
Payer: MEDICARE

## 2025-04-10 ENCOUNTER — HOSPITAL ENCOUNTER (OUTPATIENT)
Facility: HOSPITAL | Age: 65
Setting detail: OBSERVATION
LOS: 1 days | Discharge: NURSING, REHAB OR LONG-TERM CARE | End: 2025-04-15
Attending: EMERGENCY MEDICINE | Admitting: INTERNAL MEDICINE
Payer: MEDICARE

## 2025-04-10 DIAGNOSIS — S81.802A WOUND OF LEFT LOWER EXTREMITY, INITIAL ENCOUNTER: ICD-10-CM

## 2025-04-10 DIAGNOSIS — L03.116 BILATERAL CELLULITIS OF LOWER LEG: ICD-10-CM

## 2025-04-10 DIAGNOSIS — R60.0 PERIPHERAL EDEMA: Primary | ICD-10-CM

## 2025-04-10 DIAGNOSIS — I48.3 TYPICAL ATRIAL FLUTTER: ICD-10-CM

## 2025-04-10 DIAGNOSIS — S81.801A WOUND OF RIGHT LOWER EXTREMITY, INITIAL ENCOUNTER: ICD-10-CM

## 2025-04-10 DIAGNOSIS — L03.115 BILATERAL CELLULITIS OF LOWER LEG: ICD-10-CM

## 2025-04-10 PROBLEM — R53.1 WEAKNESS: Status: ACTIVE | Noted: 2025-04-10

## 2025-04-10 LAB
ALBUMIN SERPL-MCNC: 3.5 G/DL (ref 3.5–5.2)
ALBUMIN/GLOB SERPL: 1 G/DL
ALP SERPL-CCNC: 68 U/L (ref 39–117)
ALT SERPL W P-5'-P-CCNC: 11 U/L (ref 1–33)
ANION GAP SERPL CALCULATED.3IONS-SCNC: 8 MMOL/L (ref 5–15)
ANION GAP SERPL CALCULATED.3IONS-SCNC: 8.3 MMOL/L (ref 5–15)
AST SERPL-CCNC: 12 U/L (ref 1–32)
BASOPHILS # BLD AUTO: 0.05 10*3/MM3 (ref 0–0.2)
BASOPHILS # BLD AUTO: 0.05 10*3/MM3 (ref 0–0.2)
BASOPHILS NFR BLD AUTO: 0.6 % (ref 0–1.5)
BASOPHILS NFR BLD AUTO: 0.7 % (ref 0–1.5)
BILIRUB SERPL-MCNC: 0.2 MG/DL (ref 0–1.2)
BUN SERPL-MCNC: 26 MG/DL (ref 8–23)
BUN SERPL-MCNC: 26 MG/DL (ref 8–23)
BUN/CREAT SERPL: 40 (ref 7–25)
BUN/CREAT SERPL: 41.3 (ref 7–25)
CALCIUM SPEC-SCNC: 9.3 MG/DL (ref 8.6–10.5)
CALCIUM SPEC-SCNC: 9.4 MG/DL (ref 8.6–10.5)
CHLORIDE SERPL-SCNC: 99 MMOL/L (ref 98–107)
CHLORIDE SERPL-SCNC: 99 MMOL/L (ref 98–107)
CO2 SERPL-SCNC: 33 MMOL/L (ref 22–29)
CO2 SERPL-SCNC: 33.7 MMOL/L (ref 22–29)
CREAT SERPL-MCNC: 0.63 MG/DL (ref 0.57–1)
CREAT SERPL-MCNC: 0.65 MG/DL (ref 0.57–1)
CRP SERPL-MCNC: 5.77 MG/DL (ref 0–0.5)
D-LACTATE SERPL-SCNC: 2 MMOL/L (ref 0.5–2)
DEPRECATED RDW RBC AUTO: 51.3 FL (ref 37–54)
DEPRECATED RDW RBC AUTO: 51.5 FL (ref 37–54)
EGFRCR SERPLBLD CKD-EPI 2021: 97.2 ML/MIN/1.73
EGFRCR SERPLBLD CKD-EPI 2021: 98 ML/MIN/1.73
EOSINOPHIL # BLD AUTO: 0.12 10*3/MM3 (ref 0–0.4)
EOSINOPHIL # BLD AUTO: 0.14 10*3/MM3 (ref 0–0.4)
EOSINOPHIL NFR BLD AUTO: 1.4 % (ref 0.3–6.2)
EOSINOPHIL NFR BLD AUTO: 1.8 % (ref 0.3–6.2)
ERYTHROCYTE [DISTWIDTH] IN BLOOD BY AUTOMATED COUNT: 14.1 % (ref 12.3–15.4)
ERYTHROCYTE [DISTWIDTH] IN BLOOD BY AUTOMATED COUNT: 14.1 % (ref 12.3–15.4)
ERYTHROCYTE [SEDIMENTATION RATE] IN BLOOD: 44 MM/HR (ref 0–30)
GEN 5 1HR TROPONIN T REFLEX: 8 NG/L
GLOBULIN UR ELPH-MCNC: 3.4 GM/DL
GLUCOSE SERPL-MCNC: 112 MG/DL (ref 65–99)
GLUCOSE SERPL-MCNC: 137 MG/DL (ref 65–99)
HCT VFR BLD AUTO: 37.2 % (ref 34–46.6)
HCT VFR BLD AUTO: 37.3 % (ref 34–46.6)
HGB BLD-MCNC: 11.4 G/DL (ref 12–15.9)
HGB BLD-MCNC: 11.7 G/DL (ref 12–15.9)
HOLD SPECIMEN: NORMAL
HOLD SPECIMEN: NORMAL
IMM GRANULOCYTES # BLD AUTO: 0.03 10*3/MM3 (ref 0–0.05)
IMM GRANULOCYTES # BLD AUTO: 0.05 10*3/MM3 (ref 0–0.05)
IMM GRANULOCYTES NFR BLD AUTO: 0.4 % (ref 0–0.5)
IMM GRANULOCYTES NFR BLD AUTO: 0.6 % (ref 0–0.5)
LYMPHOCYTES # BLD AUTO: 0.76 10*3/MM3 (ref 0.7–3.1)
LYMPHOCYTES # BLD AUTO: 0.9 10*3/MM3 (ref 0.7–3.1)
LYMPHOCYTES NFR BLD AUTO: 10.7 % (ref 19.6–45.3)
LYMPHOCYTES NFR BLD AUTO: 9.9 % (ref 19.6–45.3)
MAGNESIUM SERPL-MCNC: 2.2 MG/DL (ref 1.6–2.4)
MCH RBC QN AUTO: 30.6 PG (ref 26.6–33)
MCH RBC QN AUTO: 31 PG (ref 26.6–33)
MCHC RBC AUTO-ENTMCNC: 30.6 G/DL (ref 31.5–35.7)
MCHC RBC AUTO-ENTMCNC: 31.4 G/DL (ref 31.5–35.7)
MCV RBC AUTO: 98.9 FL (ref 79–97)
MCV RBC AUTO: 99.7 FL (ref 79–97)
MONOCYTES # BLD AUTO: 0.45 10*3/MM3 (ref 0.1–0.9)
MONOCYTES # BLD AUTO: 0.67 10*3/MM3 (ref 0.1–0.9)
MONOCYTES NFR BLD AUTO: 5.9 % (ref 5–12)
MONOCYTES NFR BLD AUTO: 8 % (ref 5–12)
NEUTROPHILS NFR BLD AUTO: 6.24 10*3/MM3 (ref 1.7–7)
NEUTROPHILS NFR BLD AUTO: 6.62 10*3/MM3 (ref 1.7–7)
NEUTROPHILS NFR BLD AUTO: 78.7 % (ref 42.7–76)
NEUTROPHILS NFR BLD AUTO: 81.3 % (ref 42.7–76)
NRBC BLD AUTO-RTO: 0 /100 WBC (ref 0–0.2)
NRBC BLD AUTO-RTO: 0 /100 WBC (ref 0–0.2)
NT-PROBNP SERPL-MCNC: 163 PG/ML (ref 0–900)
PHOSPHATE SERPL-MCNC: 2.9 MG/DL (ref 2.5–4.5)
PLATELET # BLD AUTO: 295 10*3/MM3 (ref 140–450)
PLATELET # BLD AUTO: 311 10*3/MM3 (ref 140–450)
PMV BLD AUTO: 9.4 FL (ref 6–12)
PMV BLD AUTO: 9.5 FL (ref 6–12)
POTASSIUM SERPL-SCNC: 4.4 MMOL/L (ref 3.5–5.2)
POTASSIUM SERPL-SCNC: 4.7 MMOL/L (ref 3.5–5.2)
PROCALCITONIN SERPL-MCNC: 0.04 NG/ML (ref 0–0.25)
PROT SERPL-MCNC: 6.9 G/DL (ref 6–8.5)
RBC # BLD AUTO: 3.73 10*6/MM3 (ref 3.77–5.28)
RBC # BLD AUTO: 3.77 10*6/MM3 (ref 3.77–5.28)
SODIUM SERPL-SCNC: 140 MMOL/L (ref 136–145)
SODIUM SERPL-SCNC: 141 MMOL/L (ref 136–145)
TROPONIN T NUMERIC DELTA: 1 NG/L
TROPONIN T SERPL HS-MCNC: 7 NG/L
WBC NRBC COR # BLD AUTO: 7.67 10*3/MM3 (ref 3.4–10.8)
WBC NRBC COR # BLD AUTO: 8.41 10*3/MM3 (ref 3.4–10.8)
WHOLE BLOOD HOLD COAG: NORMAL
WHOLE BLOOD HOLD SPECIMEN: NORMAL

## 2025-04-10 PROCEDURE — 84484 ASSAY OF TROPONIN QUANT: CPT

## 2025-04-10 PROCEDURE — 94799 UNLISTED PULMONARY SVC/PX: CPT

## 2025-04-10 PROCEDURE — 85025 COMPLETE CBC W/AUTO DIFF WBC: CPT | Performed by: STUDENT IN AN ORGANIZED HEALTH CARE EDUCATION/TRAINING PROGRAM

## 2025-04-10 PROCEDURE — 83735 ASSAY OF MAGNESIUM: CPT | Performed by: STUDENT IN AN ORGANIZED HEALTH CARE EDUCATION/TRAINING PROGRAM

## 2025-04-10 PROCEDURE — 25010000002 CEFTRIAXONE PER 250 MG: Performed by: STUDENT IN AN ORGANIZED HEALTH CARE EDUCATION/TRAINING PROGRAM

## 2025-04-10 PROCEDURE — 84145 PROCALCITONIN (PCT): CPT

## 2025-04-10 PROCEDURE — 94761 N-INVAS EAR/PLS OXIMETRY MLT: CPT

## 2025-04-10 PROCEDURE — 84100 ASSAY OF PHOSPHORUS: CPT | Performed by: STUDENT IN AN ORGANIZED HEALTH CARE EDUCATION/TRAINING PROGRAM

## 2025-04-10 PROCEDURE — 99221 1ST HOSP IP/OBS SF/LOW 40: CPT | Performed by: STUDENT IN AN ORGANIZED HEALTH CARE EDUCATION/TRAINING PROGRAM

## 2025-04-10 PROCEDURE — 83605 ASSAY OF LACTIC ACID: CPT

## 2025-04-10 PROCEDURE — 36415 COLL VENOUS BLD VENIPUNCTURE: CPT

## 2025-04-10 PROCEDURE — 96372 THER/PROPH/DIAG INJ SC/IM: CPT

## 2025-04-10 PROCEDURE — 25010000002 MORPHINE PER 10 MG: Performed by: EMERGENCY MEDICINE

## 2025-04-10 PROCEDURE — 93005 ELECTROCARDIOGRAM TRACING: CPT | Performed by: EMERGENCY MEDICINE

## 2025-04-10 PROCEDURE — 93970 EXTREMITY STUDY: CPT

## 2025-04-10 PROCEDURE — 85025 COMPLETE CBC W/AUTO DIFF WBC: CPT | Performed by: EMERGENCY MEDICINE

## 2025-04-10 PROCEDURE — 71045 X-RAY EXAM CHEST 1 VIEW: CPT

## 2025-04-10 PROCEDURE — 96375 TX/PRO/DX INJ NEW DRUG ADDON: CPT

## 2025-04-10 PROCEDURE — 83880 ASSAY OF NATRIURETIC PEPTIDE: CPT

## 2025-04-10 PROCEDURE — 80053 COMPREHEN METABOLIC PANEL: CPT | Performed by: EMERGENCY MEDICINE

## 2025-04-10 PROCEDURE — 25010000002 ENOXAPARIN PER 10 MG: Performed by: STUDENT IN AN ORGANIZED HEALTH CARE EDUCATION/TRAINING PROGRAM

## 2025-04-10 PROCEDURE — 99285 EMERGENCY DEPT VISIT HI MDM: CPT | Performed by: EMERGENCY MEDICINE

## 2025-04-10 PROCEDURE — G0378 HOSPITAL OBSERVATION PER HR: HCPCS

## 2025-04-10 PROCEDURE — 85652 RBC SED RATE AUTOMATED: CPT

## 2025-04-10 PROCEDURE — 86140 C-REACTIVE PROTEIN: CPT

## 2025-04-10 PROCEDURE — 25010000002 DOXYCYCLINE 100 MG RECONSTITUTED SOLUTION 1 EACH VIAL: Performed by: STUDENT IN AN ORGANIZED HEALTH CARE EDUCATION/TRAINING PROGRAM

## 2025-04-10 RX ORDER — ENOXAPARIN SODIUM 100 MG/ML
40 INJECTION SUBCUTANEOUS EVERY 12 HOURS
Status: DISCONTINUED | OUTPATIENT
Start: 2025-04-10 | End: 2025-04-10

## 2025-04-10 RX ORDER — ALBUTEROL SULFATE 0.83 MG/ML
2.5 SOLUTION RESPIRATORY (INHALATION) EVERY 6 HOURS PRN
Status: DISCONTINUED | OUTPATIENT
Start: 2025-04-10 | End: 2025-04-15 | Stop reason: HOSPADM

## 2025-04-10 RX ORDER — ARFORMOTEROL TARTRATE 15 UG/2ML
15 SOLUTION RESPIRATORY (INHALATION)
Status: DISCONTINUED | OUTPATIENT
Start: 2025-04-10 | End: 2025-04-15 | Stop reason: HOSPADM

## 2025-04-10 RX ORDER — FUROSEMIDE 40 MG/1
80 TABLET ORAL DAILY
Status: DISCONTINUED | OUTPATIENT
Start: 2025-04-11 | End: 2025-04-15 | Stop reason: HOSPADM

## 2025-04-10 RX ORDER — ONDANSETRON 2 MG/ML
4 INJECTION INTRAMUSCULAR; INTRAVENOUS EVERY 6 HOURS PRN
Status: DISCONTINUED | OUTPATIENT
Start: 2025-04-10 | End: 2025-04-15 | Stop reason: HOSPADM

## 2025-04-10 RX ORDER — SODIUM CHLORIDE 0.9 % (FLUSH) 0.9 %
10 SYRINGE (ML) INJECTION EVERY 12 HOURS SCHEDULED
Status: DISCONTINUED | OUTPATIENT
Start: 2025-04-10 | End: 2025-04-15 | Stop reason: HOSPADM

## 2025-04-10 RX ORDER — POTASSIUM CHLORIDE 750 MG/1
10 CAPSULE, EXTENDED RELEASE ORAL 2 TIMES DAILY WITH MEALS
COMMUNITY
Start: 2025-03-23 | End: 2025-04-15 | Stop reason: HOSPADM

## 2025-04-10 RX ORDER — METOPROLOL TARTRATE 50 MG
50 TABLET ORAL 2 TIMES DAILY
Status: DISCONTINUED | OUTPATIENT
Start: 2025-04-10 | End: 2025-04-15 | Stop reason: HOSPADM

## 2025-04-10 RX ORDER — LEVOTHYROXINE SODIUM 100 UG/1
200 TABLET ORAL EVERY MORNING
Status: DISCONTINUED | OUTPATIENT
Start: 2025-04-11 | End: 2025-04-15 | Stop reason: HOSPADM

## 2025-04-10 RX ORDER — LEVOTHYROXINE SODIUM 88 UG/1
88 TABLET ORAL DAILY
Status: DISCONTINUED | OUTPATIENT
Start: 2025-04-11 | End: 2025-04-15 | Stop reason: HOSPADM

## 2025-04-10 RX ORDER — CEPHALEXIN 500 MG/1
500 CAPSULE ORAL 4 TIMES DAILY
Qty: 28 CAPSULE | Refills: 0 | Status: SHIPPED | OUTPATIENT
Start: 2025-04-10 | End: 2025-04-14 | Stop reason: HOSPADM

## 2025-04-10 RX ORDER — SODIUM CHLORIDE 9 MG/ML
40 INJECTION, SOLUTION INTRAVENOUS AS NEEDED
Status: DISCONTINUED | OUTPATIENT
Start: 2025-04-10 | End: 2025-04-15 | Stop reason: HOSPADM

## 2025-04-10 RX ORDER — SODIUM CHLORIDE 0.9 % (FLUSH) 0.9 %
10 SYRINGE (ML) INJECTION AS NEEDED
Status: DISCONTINUED | OUTPATIENT
Start: 2025-04-10 | End: 2025-04-15 | Stop reason: HOSPADM

## 2025-04-10 RX ORDER — DOXYCYCLINE 100 MG/1
100 CAPSULE ORAL 2 TIMES DAILY
Qty: 14 CAPSULE | Refills: 0 | Status: SHIPPED | OUTPATIENT
Start: 2025-04-10 | End: 2025-04-14 | Stop reason: HOSPADM

## 2025-04-10 RX ORDER — DOXYCYCLINE 100 MG/1
100 CAPSULE ORAL ONCE
Status: COMPLETED | OUTPATIENT
Start: 2025-04-10 | End: 2025-04-10

## 2025-04-10 RX ADMIN — ARFORMOTEROL TARTRATE 15 MCG: 15 SOLUTION RESPIRATORY (INHALATION) at 21:14

## 2025-04-10 RX ADMIN — CEFTRIAXONE 2000 MG: 2 INJECTION, POWDER, FOR SOLUTION INTRAMUSCULAR; INTRAVENOUS at 20:54

## 2025-04-10 RX ADMIN — CEPHALEXIN 500 MG: 250 CAPSULE ORAL at 15:09

## 2025-04-10 RX ADMIN — RIVAROXABAN 20 MG: 10 TABLET, FILM COATED ORAL at 23:43

## 2025-04-10 RX ADMIN — MORPHINE SULFATE 4 MG: 4 INJECTION, SOLUTION INTRAMUSCULAR; INTRAVENOUS at 12:35

## 2025-04-10 RX ADMIN — DOXYCYCLINE 100 MG: 100 CAPSULE ORAL at 15:09

## 2025-04-10 RX ADMIN — Medication 10 ML: at 20:54

## 2025-04-10 RX ADMIN — ENOXAPARIN SODIUM 40 MG: 100 INJECTION SUBCUTANEOUS at 18:36

## 2025-04-10 RX ADMIN — DOXYCYCLINE 100 MG: 100 INJECTION, POWDER, LYOPHILIZED, FOR SOLUTION INTRAVENOUS at 23:43

## 2025-04-10 NOTE — ED NOTES
[facility] was here to take pt home. When ems was loading up pt into stretcher pt began to cry and stated she does not want to go home. Pt states she wants to be admitted and refusing to let ems transport her back home. Tim HARRIS talked with pt.

## 2025-04-10 NOTE — DISCHARGE INSTRUCTIONS
Please follow-up with the wound care that the  provided you with, a referral has been placed.  I have sent you antibiotics, please take these as directed.  You can use wet-to-dry dressings, and recommend changing these every 24 hours.  If you get fevers or significant worsening symptoms please return for reevaluation

## 2025-04-10 NOTE — H&P
[facility]   HISTORY AND PHYSICAL      Name:  Patsy Frazier   Age:  64 y.o.  Sex:  female  :  1960  MRN:  [id number]   Visit Number:  [id number]  Admission Date:  4/10/2025  Date Of Service:  04/10/25  Primary Care Physician:  Eva Washburn APRN    Chief Complaint:     Lower extremity edema, weakness    History Of Presenting Illness:      64-year-old female, history of congestive heart failure, COPD, stroke, SVT, presented for evaluation of bilateral lower extremity pain and swelling.  Has been ongoing for multiple months patient reports that the pain and swelling has increased over the last couple of days more than it had been, decreased mobility because of this, she reports that she has been using pads wrapped around her leg to help with the weeping edema.      She denies any traumatic injuries.  Appears to be anticoagulated with Xarelto.  Dopplerable and palpable pedal pulses  in ED.     Denies chest pain, denies shortness of breath.  Intermittent oxygen use at 3 L as needed, denies fevers.  Reports that this all started when she had wounds that were previously healed but accidentally nicked one of her legs and created an abrasion . Hx of leg edema since age 18. No other active complains     Review Of Systems:    All systems were reviewed and negative except as mentioned in history of presenting illness, assessment and plan.    Past Medical History: Patient  has a past medical history of Arthritis, Asthma, CHF (congestive heart failure), COPD (chronic obstructive pulmonary disease), Hypothyroidism, Stroke (), SVT (supraventricular tachycardia), and Wears glasses.    Past Surgical History: Patient  has a past surgical history that includes Tubal ligation (); Neck surgery (); Cholecystectomy (); and Cardiac electrophysiology procedure (N/A, 11/3/2017).    Social History: Patient  reports that she has been smoking cigarettes. She has a 20 pack-year  smoking history. She has never used smokeless tobacco. She reports that she does not drink alcohol and does not use drugs.    Family History:  per patient family history of bilat lower extrem edema     Allergies:      Patient has no known allergies.    Home Medications:    Prior to Admission Medications       Prescriptions Last Dose Informant Patient Reported? Taking?    albuterol sulfate  (90 Base) MCG/ACT inhaler   No No    Inhale 2 puffs Every 4 (Four) Hours As Needed for Wheezing.    cetirizine (zyrTEC) 10 MG tablet   Yes No    Take 1 tablet by mouth Daily.    Patient not taking:  Reported on 5/13/2024    fluticasone (FLONASE) 50 MCG/ACT nasal spray   Yes No    2 sprays into the nostril(s) as directed by provider Daily.    Patient not taking:  Reported on 5/13/2024    furosemide (LASIX) 80 MG tablet   No No    Take 1 tablet by mouth Daily.    levothyroxine (SYNTHROID, LEVOTHROID) 200 MCG tablet  Self Yes No    Take 1 tablet by mouth Every Morning.    levothyroxine (SYNTHROID, LEVOTHROID) 88 MCG tablet   Yes No    Take 1 tablet by mouth Daily. Take with the 200mg tablet for a total of 288mg per day    metoprolol tartrate (LOPRESSOR) 50 MG tablet   Yes No    Take 1 tablet by mouth 2 (Two) Times a Day.    Multiple Vitamins-Minerals (MULTIVITAMIN ADULT PO)  Self Yes No    Take 1 tablet by mouth Daily.    potassium chloride (KLOR-CON M20) 20 MEQ CR tablet   No No    Take 1 tablet by mouth Daily.    potassium chloride (MICRO-K) 10 MEQ CR capsule   Yes Yes    Take 1 capsule by mouth 2 (Two) Times a Day With Meals.    rivaroxaban (XARELTO) 20 MG tablet   No No    Take 1 tablet by mouth Daily With Dinner.    Umeclidinium-Vilanterol (Anoro Ellipta) 62.5-25 MCG/ACT aerosol powder  inhaler   No No    Inhale 1 puff Daily for 30 days.          ED Medications:    Medications   sodium chloride 0.9 % flush 10 mL (has no administration in time range)   sodium chloride 0.9 % flush 10 mL (has no administration in time range)  "  sodium chloride 0.9 % flush 10 mL (has no administration in time range)   sodium chloride 0.9 % infusion 40 mL (has no administration in time range)   ondansetron (ZOFRAN) injection 4 mg (has no administration in time range)   Pharmacy to Dose enoxaparin (LOVENOX) (has no administration in time range)   Potassium Replacement - Follow Nurse / BPA Driven Protocol (has no administration in time range)   Magnesium Standard Dose Replacement - Follow Nurse / BPA Driven Protocol (has no administration in time range)   Phosphorus Replacement - Follow Nurse / BPA Driven Protocol (has no administration in time range)   Calcium Replacement - Follow Nurse / BPA Driven Protocol (has no administration in time range)   enoxaparin sodium (LOVENOX) syringe 40 mg (40 mg Subcutaneous Given 4/10/25 1836)   morphine injection 4 mg (4 mg Intravenous Given 4/10/25 1235)   cephalexin (KEFLEX) capsule 500 mg (500 mg Oral Given 4/10/25 1509)   doxycycline (MONODOX) capsule 100 mg (100 mg Oral Given 4/10/25 1509)     Vital Signs:  Temp:  [98 °F (36.7 °C)] 98 °F (36.7 °C)  Heart Rate:  [] 120  Resp:  [20] 20  BP: (109-120)/(56-85) 120/58        04/10/25  1202   Weight: 136 kg (300 lb)     Body mass index is 45.61 kg/m².    Physical Exam:     Most recent vital Signs: /58   Pulse 120   Temp 98 °F (36.7 °C) (Axillary)   Resp 20   Ht 172.7 cm (68\")   Wt 136 kg (300 lb)   SpO2 91%   BMI 45.61 kg/m²     Physical Exam  General: NAD, resting comfortably in bed, obese, NC   HEENT: Normocephalic, atraumatic, PERRL. neck supple,   Cardiovascular: regularly irregular rhythm (PVCs). No murmurs, rubs, or gallops. Peripheral pulses 2+ bilaterally. No pedal edema,   Pulmonary: Lungs CTAB on ant fields No accessory muscle use, no resp. distress.   Abdomen: Soft, non-tender, non-distended. No guarding or rebound.   Extremities: No cyanosis, clubbing or edema. Warm and well-perfused. Capillary refill < 2 s . Extensive LE edema and erythema " "with scaling bilaterally, obese LE   Neurological: Alert and oriented to person, place, and time. No focal deficits. Cranial nerves II-XII grossly intact. Motor strength 5/5 in all extremities. Sensation grossly intact to light touch.   Psychiatric: Calm, cooperative. Mood and affect appropriate for the situation.    Laboratory data:    I have reviewed the labs done in the emergency room.    Results from last 7 days   Lab Units 04/10/25  1842 04/10/25  1217   SODIUM mmol/L 140 141   POTASSIUM mmol/L 4.7 4.4   CHLORIDE mmol/L 99 99   CO2 mmol/L 33.0* 33.7*   BUN mg/dL 26* 26*   CREATININE mg/dL 0.65 0.63   CALCIUM mg/dL 9.3 9.4   BILIRUBIN mg/dL  --  0.2   ALK PHOS U/L  --  68   ALT (SGPT) U/L  --  11   AST (SGOT) U/L  --  12   GLUCOSE mg/dL 112* 137*     Results from last 7 days   Lab Units 04/10/25  1842 04/10/25  1217   WBC 10*3/mm3 8.41 7.67   HEMOGLOBIN g/dL 11.7* 11.4*   HEMATOCRIT % 37.3 37.2   PLATELETS 10*3/mm3 295 311         Results from last 7 days   Lab Units 04/10/25  1318 04/10/25  1217   HSTROP T ng/L 8 7     Results from last 7 days   Lab Units 04/10/25  1217   PROBNP pg/mL 163.0                       Invalid input(s): \"USDES\", \"NITRITITE\", \"BACT\", \"EP\"    Pain Management Panel           No data to display                EKG:      NSR with PVCs     Radiology:    US Venous Doppler Lower Extremity Bilateral (duplex)  Result Date: 4/10/2025  BILATERAL LOWER EXTREMITY VENOUS DUPLEX DOPPLER EXAMINATION  HISTORY: Bilateral lower extremity swelling and pain, eval DVT  COMPARISON:   None  PROCEDURE: Multiple transverse and longitudinal scans were performed of both femoropopliteal deep venous system, with augmentation and compression maneuvers.  FINDINGS: Proper phasic flow was noted in the visualized bilateral lower extremity deep venous system. No intraluminal increased echogenicity is noted to suggest thrombus. There is proper compression and augmentation of the venous structures. No abnormal venous " collaterals are seen. Subcutaneous edema noted in both lower extremities.      No evidence of left or right lower extremity deep venous thrombosis.      This report was signed and finalized on 4/10/2025 2:29 PM by Nelda Argueta MD.      XR Chest 1 View  Result Date: 4/10/2025  PROCEDURE: XR CHEST 1 VW-  HISTORY: eval edema, shortness of breath.  COMPARISON: May 10, 2024..  FINDINGS: The heart is upper limits of normal, stable.. There is bilateral interstitial disease more prominent in the lung bases, stable from prior.. The mediastinum is unremarkable. There is no pneumothorax. There are no acute osseous abnormalities. Apical lordotic positioning noted.      Stable chest..     This report was signed and finalized on 4/10/2025 1:16 PM by Nelda Argueta MD.        Assessment:    64-year-old female, history of congestive heart failure, bilateral lower extremity edema chronic since age 18 (family history reported), COPD on O2, stroke, SVT, atrial flutter s/p ablation presented for evaluation of bilateral lower extremity pain and swelling ongoing for multiple months    Bilateral lower extremity lymphangitis  ?  Bilateral lower extremity superimposed cellulitis, mild  -No fevers no leukocytosis low procalcitonin  -Ultrasound  Doppler negative for DVT  Lower extremity swelling since 18 years old unclear etiology, positive family history  Obesity  PVCs  Weakness/deconditioning  History of heart failure        Plan:  -Initially patient was going to be discharged from ED however once EMS arrived patient refused to be discharged because she describes he felt too weak.  Patient's insurance will not take her for home health per chart review.  Initially instructed ED that there was no medical need for admission at the time that we discussed the case however patient was unable to ambulate and discharge was deemed unsafe.  Admitted for placement to rehab.  -Ceftriaxone doxycycline  - Telemetry  - Continue home medications as  appropriate  - PT OT case management consult    Risk Assessment: Moderate  DVT Prophylaxis: Lovenox  Code Status: Full          Jimbo Love MD  04/10/25  19:49 EDT    Dictated utilizing Dragon dictation.

## 2025-04-10 NOTE — PROGRESS NOTES
"Pharmacy Consult - Enoxaparin Dosing  Patsy Frazier is a 64 y.o. female who has been consulted to dose enoxaparin for VTE prophylaxis.     Allergies  Patient has no known allergies.    Relevant clinical data and objective history reviewed:   [Ht: 172.7 cm (68\"); Wt: 136 kg (300 lb)]  Body mass index is 45.61 kg/m².  Estimated Creatinine Clearance: 128.5 mL/min (by C-G formula based on SCr of 0.63 mg/dL).  Results from last 7 days   Lab Units 04/10/25  1217   HEMOGLOBIN g/dL 11.4*   HEMATOCRIT % 37.2   PLATELETS 10*3/mm3 311   CREATININE mg/dL 0.63       Asessment/Plan  Initiate enoxaparin 40 mg SQ every 12 hours for BMI 40-50 per system guidance.  Pharmacy will monitor Ms. Frazier's renal function and clinical status and adjust the enoxaparin dose and/or frequency as needed.    Thank you for the consult,    Juan Diego KaurD, BCPS   4/10/2025  18:20 EDT  "

## 2025-04-10 NOTE — ED PROVIDER NOTES
"Subjective  History of Present Illness:    Patient is a 64-year-old female, history of congestive heart failure, COPD, stroke, SVT, presented for evaluation of bilateral lower extremity pain and swelling.  Has been ongoing for multiple months patient reports that the pain and swelling has increased over the last couple of days more than it had been, decreased mobility because of this, she reports that she has been using pads wrapped around her leg to help with the weeping edema.  She denies any traumatic injuries.  Appears to be anticoagulated with Xarelto.  Dopplerable and palpable pedal pulses at bedside denies history of diabetes.  Denies chest pain, denies shortness of breath.  Intermittent oxygen use at 3 L as needed, denies fevers.  Reports that this all started when she had wounds that were previously healed but accidentally nicked one of her legs and created an abrasion      Nurses Notes reviewed and agree, including vitals, allergies, social history and prior medical history.     REVIEW OF SYSTEMS: All systems reviewed and not pertinent unless noted.  Review of Systems   Constitutional:  Negative for fever.   Respiratory:  Negative for shortness of breath.    Cardiovascular:  Positive for leg swelling. Negative for chest pain.   All other systems reviewed and are negative.      Past Medical History:   Diagnosis Date    Arthritis     Asthma     CHF (congestive heart failure)     COPD (chronic obstructive pulmonary disease)     Hypothyroidism     Stroke 1976    \"mini stroke\" at the age of 16, no deficits per pt report     SVT (supraventricular tachycardia)     prior hx of     Wears glasses        Allergies:    Patient has no known allergies.      Past Surgical History:   Procedure Laterality Date    CARDIAC ELECTROPHYSIOLOGY PROCEDURE N/A 11/3/2017    Procedure: Ablation atrial flutter vs SVT;  Surgeon: Chavo Carr MD;  Location: [facility] INVASIVE LOCATION;  Service:     CHOLECYSTECTOMY  2010    NECK " "SURGERY  2007    TUBAL ABDOMINAL LIGATION  1981         Social History     Socioeconomic History    Marital status:    Tobacco Use    Smoking status: Every Day     Current packs/day: 0.50     Average packs/day: 0.5 packs/day for 40.0 years (20.0 ttl pk-yrs)     Types: Cigarettes    Smokeless tobacco: Never   Vaping Use    Vaping status: Never Used   Substance and Sexual Activity    Alcohol use: No    Drug use: No    Sexual activity: Defer         History reviewed. No pertinent family history.    Objective  Physical Exam:  /58   Pulse 82   Temp 98 °F (36.7 °C) (Axillary)   Resp 20   Ht 172.7 cm (68\")   Wt 136 kg (300 lb)   SpO2 98%   BMI 45.61 kg/m²      Physical Exam  Vitals and nursing note reviewed.   Constitutional:       General: She is not in acute distress.     Appearance: She is obese. She is not toxic-appearing or diaphoretic.      Comments: Chronically ill-appearing   HENT:      Head: Normocephalic and atraumatic.      Nose: Nose normal.      Mouth/Throat:      Pharynx: Oropharynx is clear.   Eyes:      Extraocular Movements: Extraocular movements intact.   Cardiovascular:      Rate and Rhythm: Normal rate and regular rhythm.      Pulses: Normal pulses.   Pulmonary:      Effort: Pulmonary effort is normal. No respiratory distress.      Breath sounds: Normal breath sounds.   Abdominal:      General: Abdomen is flat.      Palpations: Abdomen is soft.   Musculoskeletal:         General: Normal range of motion.      Cervical back: Normal range of motion.   Skin:     General: Skin is warm and dry.      Capillary Refill: Capillary refill takes less than 2 seconds.      Findings: Erythema present.      Comments: There is ulcerations, erythema, warmth of the bilateral lower extremities encompassing most of the bilateral lower extremities from the mid shin down, see wound images for further   Neurological:      General: No focal deficit present.      Mental Status: She is alert and oriented to " person, place, and time.   Psychiatric:         Mood and Affect: Mood normal.         Behavior: Behavior normal.         Thought Content: Thought content normal.         Judgment: Judgment normal.               Procedures    ED Course:    ED Course as of 04/10/25 1814   Thu Apr 10, 2025   1240 C-Reactive Protein(!): 5.77 [JR]   1245 proBNP: 163.0 [JR]   1306   EKG Interpretation    Evaluated and interpreted by emergency department physician    Rhythm: Normal Sinus Rhythm  Rate: Normal  Axis: Sobia  Ectopy: PAC  Conduction: RBBB  ST Segments: Normal  T Waves: T wave versions in V1 V2 V3 aVR  Q Waves: AVR    Clinical Impression: Normal Sinus Rhythm    Nathan Jones DO   [CR]   2790 Spoke with case management, they will attempt to set her up with wound care from home. [JR]      ED Course User Index  [CR] Nathan Jones DO  [JR] Tim Villela PA-C       Lab Results (last 24 hours)       Procedure Component Value Units Date/Time    CBC & Differential [[id number]]  (Abnormal) Collected: 04/10/25 1217    Specimen: Blood Updated: 04/10/25 1222    Narrative:      The following orders were created for panel order CBC & Differential.  Procedure                               Abnormality         Status                     ---------                               -----------         ------                     CBC Auto Differential[[id number]]        Abnormal            Final result                 Please view results for these tests on the individual orders.    Comprehensive Metabolic Panel [[id number]]  (Abnormal) Collected: 04/10/25 1217    Specimen: Blood Updated: 04/10/25 1239     Glucose 137 mg/dL      BUN 26 mg/dL      Creatinine 0.63 mg/dL      Sodium 141 mmol/L      Potassium 4.4 mmol/L      Chloride 99 mmol/L      CO2 33.7 mmol/L      Calcium 9.4 mg/dL      Total Protein 6.9 g/dL      Albumin 3.5 g/dL      ALT (SGPT) 11 U/L      AST (SGOT) 12 U/L      Alkaline Phosphatase 68 U/L      Total  Bilirubin 0.2 mg/dL      Globulin 3.4 gm/dL      A/G Ratio 1.0 g/dL      BUN/Creatinine Ratio 41.3     Anion Gap 8.3 mmol/L      eGFR 98.0 mL/min/1.73     Narrative:      GFR Categories in Chronic Kidney Disease (CKD)      GFR Category          GFR (mL/min/1.73)    Interpretation  G1                     90 or greater         Normal or high (1)  G2                      60-89                Mild decrease (1)  G3a                   45-59                Mild to moderate decrease  G3b                   30-44                Moderate to severe decrease  G4                    15-29                Severe decrease  G5                    14 or less           Kidney failure          (1)In the absence of evidence of kidney disease, neither GFR category G1 or G2 fulfill the criteria for CKD.    eGFR calculation 2021 CKD-EPI creatinine equation, which does not include race as a factor    CBC Auto Differential [[id number]]  (Abnormal) Collected: 04/10/25 1217    Specimen: Blood Updated: 04/10/25 1222     WBC 7.67 10*3/mm3      RBC 3.73 10*6/mm3      Hemoglobin 11.4 g/dL      Hematocrit 37.2 %      MCV 99.7 fL      MCH 30.6 pg      MCHC 30.6 g/dL      RDW 14.1 %      RDW-SD 51.5 fl      MPV 9.5 fL      Platelets 311 10*3/mm3      Neutrophil % 81.3 %      Lymphocyte % 9.9 %      Monocyte % 5.9 %      Eosinophil % 1.8 %      Basophil % 0.7 %      Immature Grans % 0.4 %      Neutrophils, Absolute 6.24 10*3/mm3      Lymphocytes, Absolute 0.76 10*3/mm3      Monocytes, Absolute 0.45 10*3/mm3      Eosinophils, Absolute 0.14 10*3/mm3      Basophils, Absolute 0.05 10*3/mm3      Immature Grans, Absolute 0.03 10*3/mm3      nRBC 0.0 /100 WBC     C-reactive Protein [[id number]]  (Abnormal) Collected: 04/10/25 1217    Specimen: Blood Updated: 04/10/25 1239     C-Reactive Protein 5.77 mg/dL     Sedimentation Rate [[id number]]  (Abnormal) Collected: 04/10/25 1217    Specimen: Blood Updated: 04/10/25 1229     Sed Rate 44 mm/hr     Lactic Acid, Plasma  "[[id number]]  (Normal) Collected: 04/10/25 1217    Specimen: Blood Updated: 04/10/25 1242     Lactate 2.0 mmol/L     Procalcitonin [[id number]]  (Normal) Collected: 04/10/25 1217    Specimen: Blood Updated: 04/10/25 1254     Procalcitonin 0.04 ng/mL     Narrative:      As a Marker for Sepsis (Non-Neonates):    1. <0.5 ng/mL represents a low risk of severe sepsis and/or septic shock.  2. >2 ng/mL represents a high risk of severe sepsis and/or septic shock.    As a Marker for Lower Respiratory Tract Infections that require antibiotic therapy:    PCT on Admission    Antibiotic Therapy       6-12 Hrs later    >0.5                Strongly Recommended  >0.25 - <0.5        Recommended   0.1 - 0.25          Discouraged              Remeasure/reassess PCT  <0.1                Strongly Discouraged     Remeasure/reassess PCT    As 28 day mortality risk marker: \"Change in Procalcitonin Result\" (>80% or <=80%) if Day 0 (or Day 1) and Day 4 values are available. Refer to http://www.[employer]-pct-calculator.com    Change in PCT <=80%  A decrease of PCT levels below or equal to 80% defines a positive change in PCT test result representing a higher risk for 28-day all-cause mortality of patients diagnosed with severe sepsis for septic shock.    Change in PCT >80%  A decrease of PCT levels of more than 80% defines a negative change in PCT result representing a lower risk for 28-day all-cause mortality of patients diagnosed with severe sepsis or septic shock.       BNP [[id number]]  (Normal) Collected: 04/10/25 1217    Specimen: Blood Updated: 04/10/25 1243     proBNP 163.0 pg/mL     Narrative:      This assay is used as an aid in the diagnosis of individuals suspected of having heart failure. It can be used as an aid in the diagnosis of acute decompensated heart failure (ADHF) in patients presenting with signs and symptoms of ADHF to the emergency department (ED). In addition, NT-proBNP of <300 pg/mL indicates ADHF is not likely.    Age " Range Result Interpretation  NT-proBNP Concentration (pg/mL:      <50             Positive            >450                   Gray                 300-450                    Negative             <300    50-75           Positive            >900                  Gray                300-900                  Negative            <300      >75             Positive            >1800                  Gray                300-1800                  Negative            <300    High Sensitivity Troponin T [[id number]]  (Normal) Collected: 04/10/25 1217    Specimen: Blood Updated: 04/10/25 1243     HS Troponin T 7 ng/L     Narrative:      High Sensitive Troponin T Reference Range:  <14.0 ng/L- Negative Female for AMI  <22.0 ng/L- Negative Male for AMI  >=14 - Abnormal Female indicating possible myocardial injury.  >=22 - Abnormal Male indicating possible myocardial injury.   Clinicians would have to utilize clinical acumen, EKG, Troponin, and serial changes to determine if it is an Acute Myocardial Infarction or myocardial injury due to an underlying chronic condition.         High Sensitivity Troponin T 1Hr [[id number]]  (Normal) Collected: 04/10/25 1318    Specimen: Blood Updated: 04/10/25 1339     HS Troponin T 8 ng/L      Troponin T Numeric Delta 1 ng/L     Narrative:      High Sensitive Troponin T Reference Range:  <14.0 ng/L- Negative Female for AMI  <22.0 ng/L- Negative Male for AMI  >=14 - Abnormal Female indicating possible myocardial injury.  >=22 - Abnormal Male indicating possible myocardial injury.   Clinicians would have to utilize clinical acumen, EKG, Troponin, and serial changes to determine if it is an Acute Myocardial Infarction or myocardial injury due to an underlying chronic condition.                  US Venous Doppler Lower Extremity Bilateral (duplex)  Result Date: 4/10/2025  BILATERAL LOWER EXTREMITY VENOUS DUPLEX DOPPLER EXAMINATION  HISTORY: Bilateral lower extremity swelling and pain, eval DVT   COMPARISON:   None  PROCEDURE: Multiple transverse and longitudinal scans were performed of both femoropopliteal deep venous system, with augmentation and compression maneuvers.  FINDINGS: Proper phasic flow was noted in the visualized bilateral lower extremity deep venous system. No intraluminal increased echogenicity is noted to suggest thrombus. There is proper compression and augmentation of the venous structures. No abnormal venous collaterals are seen. Subcutaneous edema noted in both lower extremities.      Impression: No evidence of left or right lower extremity deep venous thrombosis.      This report was signed and finalized on 4/10/2025 2:29 PM by Nelda Argueta MD.      XR Chest 1 View  Result Date: 4/10/2025  PROCEDURE: XR CHEST 1 VW-  HISTORY: eval edema, shortness of breath.  COMPARISON: May 10, 2024..  FINDINGS: The heart is upper limits of normal, stable.. There is bilateral interstitial disease more prominent in the lung bases, stable from prior.. The mediastinum is unremarkable. There is no pneumothorax. There are no acute osseous abnormalities. Apical lordotic positioning noted.      Impression: Stable chest..     This report was signed and finalized on 4/10/2025 1:16 PM by Nelda Argueta MD.           MDM      Initial impression of presenting illness: Patient is a 64-year-old female presented for evaluation of bilateral lower extremity pain and swelling    DDX: includes but is not limited to: CHF exacerbation, peripheral edema, lymphangitis, cellulitis, venous stasis dermatitis, sepsis, others    Patient arrives hemodynamically stable afebrile nontachycardic nontachypneic and not with vitals interpreted by myself.     Pertinent features from physical exam: Weeping of the bilateral lower extremities, palpable pedal pulses,.There is ulcerations, erythema, warmth of the bilateral lower extremities encompassing most of the bilateral lower extremities from the mid shin down, see wound images for further,  no palpable abscess.  There is associated tenderness to the bilateral lower extremities.  Lungs are grossly clear, cardiac auscultation regular rhythm, alert and orient x 4 no acute distress.  Patient with dopplerable bilateral lower pedal pulses.    Initial diagnostic plan: CBC CMP CRP lactic acid Pro-Ritesh BNP troponin, sed rate, EKG, chest x-ray, ultrasound Doppler bilateral lower extremities    Results from initial plan were reviewed and interpreted by me revealing CBC with no leukocytosis, CMP is relatively unremarkable.  CRP elevated to 5.77, sed rate of 44, lactic and proBNP within normal is, troponin normal.  Repeat troponin with a delta of 1, procalcitonin is normal.  EKG normal sinus rhythm.  Chest x-ray per radiology stable chest, I visualized this plain film personally and concur stable chest.  Venous ultrasound of the bilateral Doppler lower extremities is negative for radiology for DVT.    Diagnostic information from other sources: Record reviewed    Interventions / Re-evaluation:   Medications   sodium chloride 0.9 % flush 10 mL (has no administration in time range)   morphine injection 4 mg (4 mg Intravenous Given 4/10/25 1235)   cephalexin (KEFLEX) capsule 500 mg (500 mg Oral Given 4/10/25 1509)   doxycycline (MONODOX) capsule 100 mg (100 mg Oral Given 4/10/25 1509)       Results/clinical rationale were discussed with patient at bedside, given chronic appearance of the wounds for multiple months that have not significantly changed, no trial of outpatient antibiotics yet, as well as overall well appearance without clinically appearing septic and relatively normal labs except for isolated elevated CRP. , I Recommended outpatient wound care and management, suspect majority of this is likely venous stasis dermatitis but given her redness/erythema with elevated CRP will treat with Keflex and doxycycline. low concern for necrotizing fasciitis.  Denies history of diabetes. Patient became tearfful after  initial plan to discharge home, reports chronic mobility deficits but had been made worse over the last couple of days because of her progressive edema and would like to be admitted for PT OT and consideration of rehab.     Consultations/Discussion of results with other physicians: Discussed with ED attending physician.  Discussed with case management, Nancy NEGRETE, they have set the patient up for wound care from home.  Referral to home health placed but case management notes that due to her insurance no home health in the area was going to pick her up for treatment.  Discussed with hospitalist for admission, Dr. Love.     Disposition plan: admit.         Addendum: Initially the plan was to discharge patient home, treat with doxycycline and Keflex, follow-up with PCP and wound care who had been set up for the patient in the outpatient setting, patient sat in the ER for hours waiting on a ride, when EMS got here to take her back home, patient began crying stating that she could not get around at home although she was okay with discharge earlier and did not voice any mobility deficits, she is now wanting to seek rehabilitation placement to help get her mobility back.  Patient required significant assistance for assistance of ambulation and being able to get out of the bed by herself.  Given this I discussed with hospitalist, Dr. Love.  Who was agreeable to admit the patient.      -----    Final diagnoses:   Wound of left lower extremity, initial encounter   Wound of right lower extremity, initial encounter   Bilateral cellulitis of lower leg   Peripheral edema          Tim Villela PA-C  04/10/25 1529       Tim Villela PA-C  04/10/25 1530       Tim Villela PA-C  04/10/25 1814

## 2025-04-10 NOTE — CASE MANAGEMENT/SOCIAL WORK
Case Management/Social Work    Patient Name:  Patsy Frazier  YOB: 1960  MRN: [id number]  Admit Date:  4/10/2025    1505:  Tim Villela PA-C inquired about home health and wound care for the patient.  Spoke to patient at beside.  She is open to any home health agency that will take her insurance.    1515:  Contacted the following home health agencies, all of which were unable to provide the patient with services:  [facility] (does not accept [facility]), [facility] (out of network), [facility] (at capacity with [facility]), [facility] (does not accept [facility]), [facility] (not in service area), [facility] (does not accept [facility]), and [facility] (does not accept [facility]).    1530:  Spoke with Calvin at [facility] (a home wound care company).  He states they are able to accept the patient's insurance and would look at a referral to see if they will be able to provide her services.  He requested patient demographics, ED provider notes, and wound care information.      1536:  Faxed referral to Calvin at [facility] ([phone]).    1540:  Contacted [facility].  Updated nurse Cynthia that I was unable to find the patient a home health agency for nursing services.  Cynthia also aware of pending referral at [facility].    1550:  Spoke to patient at bedside.  She is aware that the 8 home health agencies in this area are unable to accept her insurance.  She is also aware of pending referral with [facility].    Electronically signed by:  Nancy Agrawal RN  04/10/25 15:36 EDT

## 2025-04-11 PROBLEM — L03.119 LOWER EXTREMITY CELLULITIS: Status: ACTIVE | Noted: 2025-04-11

## 2025-04-11 PROBLEM — E66.01 OBESITY, CLASS III, BMI 40-49.9 (MORBID OBESITY): Status: ACTIVE | Noted: 2025-04-11

## 2025-04-11 LAB
ANION GAP SERPL CALCULATED.3IONS-SCNC: 6.2 MMOL/L (ref 5–15)
BASOPHILS # BLD AUTO: 0.05 10*3/MM3 (ref 0–0.2)
BASOPHILS NFR BLD AUTO: 0.8 % (ref 0–1.5)
BUN SERPL-MCNC: 25 MG/DL (ref 8–23)
BUN/CREAT SERPL: 43.9 (ref 7–25)
CALCIUM SPEC-SCNC: 8.7 MG/DL (ref 8.6–10.5)
CHLORIDE SERPL-SCNC: 98 MMOL/L (ref 98–107)
CO2 SERPL-SCNC: 30.8 MMOL/L (ref 22–29)
CREAT SERPL-MCNC: 0.57 MG/DL (ref 0.57–1)
DEPRECATED RDW RBC AUTO: 50.8 FL (ref 37–54)
EGFRCR SERPLBLD CKD-EPI 2021: 100.4 ML/MIN/1.73
EOSINOPHIL # BLD AUTO: 0.11 10*3/MM3 (ref 0–0.4)
EOSINOPHIL NFR BLD AUTO: 1.9 % (ref 0.3–6.2)
ERYTHROCYTE [DISTWIDTH] IN BLOOD BY AUTOMATED COUNT: 14.2 % (ref 12.3–15.4)
GLUCOSE SERPL-MCNC: 107 MG/DL (ref 65–99)
HCT VFR BLD AUTO: 33.2 % (ref 34–46.6)
HGB BLD-MCNC: 10.3 G/DL (ref 12–15.9)
IMM GRANULOCYTES # BLD AUTO: 0.04 10*3/MM3 (ref 0–0.05)
IMM GRANULOCYTES NFR BLD AUTO: 0.7 % (ref 0–0.5)
LYMPHOCYTES # BLD AUTO: 0.79 10*3/MM3 (ref 0.7–3.1)
LYMPHOCYTES NFR BLD AUTO: 13.4 % (ref 19.6–45.3)
MAGNESIUM SERPL-MCNC: 2 MG/DL (ref 1.6–2.4)
MCH RBC QN AUTO: 30.5 PG (ref 26.6–33)
MCHC RBC AUTO-ENTMCNC: 31 G/DL (ref 31.5–35.7)
MCV RBC AUTO: 98.2 FL (ref 79–97)
MONOCYTES # BLD AUTO: 0.62 10*3/MM3 (ref 0.1–0.9)
MONOCYTES NFR BLD AUTO: 10.5 % (ref 5–12)
NEUTROPHILS NFR BLD AUTO: 4.29 10*3/MM3 (ref 1.7–7)
NEUTROPHILS NFR BLD AUTO: 72.7 % (ref 42.7–76)
NRBC BLD AUTO-RTO: 0 /100 WBC (ref 0–0.2)
PHOSPHATE SERPL-MCNC: 3.1 MG/DL (ref 2.5–4.5)
PLATELET # BLD AUTO: 283 10*3/MM3 (ref 140–450)
PMV BLD AUTO: 9.5 FL (ref 6–12)
POTASSIUM SERPL-SCNC: 4.5 MMOL/L (ref 3.5–5.2)
RBC # BLD AUTO: 3.38 10*6/MM3 (ref 3.77–5.28)
SODIUM SERPL-SCNC: 135 MMOL/L (ref 136–145)
WBC NRBC COR # BLD AUTO: 5.9 10*3/MM3 (ref 3.4–10.8)

## 2025-04-11 PROCEDURE — 83735 ASSAY OF MAGNESIUM: CPT | Performed by: STUDENT IN AN ORGANIZED HEALTH CARE EDUCATION/TRAINING PROGRAM

## 2025-04-11 PROCEDURE — 99232 SBSQ HOSP IP/OBS MODERATE 35: CPT | Performed by: INTERNAL MEDICINE

## 2025-04-11 PROCEDURE — 25010000002 DOXYCYCLINE 100 MG RECONSTITUTED SOLUTION 1 EACH VIAL: Performed by: STUDENT IN AN ORGANIZED HEALTH CARE EDUCATION/TRAINING PROGRAM

## 2025-04-11 PROCEDURE — 97166 OT EVAL MOD COMPLEX 45 MIN: CPT

## 2025-04-11 PROCEDURE — 85025 COMPLETE CBC W/AUTO DIFF WBC: CPT | Performed by: STUDENT IN AN ORGANIZED HEALTH CARE EDUCATION/TRAINING PROGRAM

## 2025-04-11 PROCEDURE — 94799 UNLISTED PULMONARY SVC/PX: CPT

## 2025-04-11 PROCEDURE — 80048 BASIC METABOLIC PNL TOTAL CA: CPT | Performed by: STUDENT IN AN ORGANIZED HEALTH CARE EDUCATION/TRAINING PROGRAM

## 2025-04-11 PROCEDURE — 84100 ASSAY OF PHOSPHORUS: CPT | Performed by: STUDENT IN AN ORGANIZED HEALTH CARE EDUCATION/TRAINING PROGRAM

## 2025-04-11 PROCEDURE — 96365 THER/PROPH/DIAG IV INF INIT: CPT

## 2025-04-11 PROCEDURE — 25010000002 CEFTRIAXONE PER 250 MG: Performed by: STUDENT IN AN ORGANIZED HEALTH CARE EDUCATION/TRAINING PROGRAM

## 2025-04-11 PROCEDURE — 97162 PT EVAL MOD COMPLEX 30 MIN: CPT

## 2025-04-11 PROCEDURE — 94664 DEMO&/EVAL PT USE INHALER: CPT

## 2025-04-11 PROCEDURE — 63710000001 REVEFENACIN 175 MCG/3ML SOLUTION: Performed by: STUDENT IN AN ORGANIZED HEALTH CARE EDUCATION/TRAINING PROGRAM

## 2025-04-11 PROCEDURE — 94761 N-INVAS EAR/PLS OXIMETRY MLT: CPT

## 2025-04-11 PROCEDURE — G0378 HOSPITAL OBSERVATION PER HR: HCPCS

## 2025-04-11 RX ORDER — HYDROCODONE BITARTRATE AND ACETAMINOPHEN 5; 325 MG/1; MG/1
1 TABLET ORAL EVERY 6 HOURS PRN
Refills: 0 | Status: DISCONTINUED | OUTPATIENT
Start: 2025-04-11 | End: 2025-04-12

## 2025-04-11 RX ORDER — MINERAL OIL/HYDROPHIL PETROLAT
1 OINTMENT (GRAM) TOPICAL EVERY 12 HOURS
Status: DISCONTINUED | OUTPATIENT
Start: 2025-04-11 | End: 2025-04-15 | Stop reason: HOSPADM

## 2025-04-11 RX ADMIN — Medication 10 ML: at 08:56

## 2025-04-11 RX ADMIN — ARFORMOTEROL TARTRATE 15 MCG: 15 SOLUTION RESPIRATORY (INHALATION) at 06:35

## 2025-04-11 RX ADMIN — METOPROLOL TARTRATE 50 MG: 50 TABLET, FILM COATED ORAL at 08:56

## 2025-04-11 RX ADMIN — REVEFENACIN 175 MCG: 175 SOLUTION RESPIRATORY (INHALATION) at 06:35

## 2025-04-11 RX ADMIN — LEVOTHYROXINE SODIUM 88 MCG: 88 TABLET ORAL at 06:15

## 2025-04-11 RX ADMIN — Medication 10 ML: at 20:28

## 2025-04-11 RX ADMIN — DOXYCYCLINE 100 MG: 100 INJECTION, POWDER, LYOPHILIZED, FOR SOLUTION INTRAVENOUS at 11:46

## 2025-04-11 RX ADMIN — CEFTRIAXONE 2000 MG: 2 INJECTION, POWDER, FOR SOLUTION INTRAMUSCULAR; INTRAVENOUS at 20:28

## 2025-04-11 RX ADMIN — METOPROLOL TARTRATE 50 MG: 50 TABLET, FILM COATED ORAL at 20:27

## 2025-04-11 RX ADMIN — DOXYCYCLINE 100 MG: 100 INJECTION, POWDER, LYOPHILIZED, FOR SOLUTION INTRAVENOUS at 22:28

## 2025-04-11 RX ADMIN — RIVAROXABAN 20 MG: 10 TABLET, FILM COATED ORAL at 17:34

## 2025-04-11 RX ADMIN — LEVOTHYROXINE SODIUM 200 MCG: 0.1 TABLET ORAL at 06:15

## 2025-04-11 RX ADMIN — FUROSEMIDE 80 MG: 40 TABLET ORAL at 08:56

## 2025-04-11 RX ADMIN — ARFORMOTEROL TARTRATE 15 MCG: 15 SOLUTION RESPIRATORY (INHALATION) at 19:15

## 2025-04-11 RX ADMIN — HYDROCODONE BITARTRATE AND ACETAMINOPHEN 1 TABLET: 5; 325 TABLET ORAL at 14:41

## 2025-04-11 NOTE — THERAPY EVALUATION
"Patient Name: Patsy Frazier  : 1960    MRN: [id number]                              Today's Date: 2025       Admit Date: 4/10/2025    Visit Dx:     ICD-10-CM ICD-9-CM   1. Peripheral edema  R60.0 782.3   2. Wound of left lower extremity, initial encounter  S81.802A 894.0   3. Wound of right lower extremity, initial encounter  S81.801A 894.0   4. Bilateral cellulitis of lower leg  L03.116 682.6    L03.115      Patient Active Problem List   Diagnosis    Atrial flutter with rapid ventricular response    Tobacco abuse    SVT (supraventricular tachycardia)    Atrial flutter    Acute hypoxic respiratory failure    Hypoxia    COPD (chronic obstructive pulmonary disease)    Bilateral lower extremity edema    Weakness     Past Medical History:   Diagnosis Date    Arthritis     Asthma     CHF (congestive heart failure)     COPD (chronic obstructive pulmonary disease)     Hypothyroidism     Stroke     \"mini stroke\" at the age of 16, no deficits per pt report     SVT (supraventricular tachycardia)     prior hx of     Wears glasses      Past Surgical History:   Procedure Laterality Date    CARDIAC ELECTROPHYSIOLOGY PROCEDURE N/A 11/3/2017    Procedure: Ablation atrial flutter vs SVT;  Surgeon: Chavo Carr MD;  Location: [facility] INVASIVE LOCATION;  Service:     CHOLECYSTECTOMY      NECK SURGERY      TUBAL ABDOMINAL LIGATION        General Information       Row Name 25 1221          OT Time and Intention    Document Type evaluation  -AH     Mode of Treatment occupational therapy  -AH     Patient Effort adequate  -AH     Symptoms Noted During/After Treatment increased pain;nausea  -       Row Name 25 1221          General Information    Patient Profile Reviewed yes  -AH     Prior Level of Function independent:;ADL's;all household mobility  pt uses a rollator for household ambulation, she reports she is independent with dressing and toileting, but reports having difficulty " with bathing tasks.  Pt performs sponge bath at home  -     Existing Precautions/Restrictions fall  -     Barriers to Rehab medically complex;previous functional deficit  -[facility] Name 04/11/25 1221          Occupational Profile    Reason for Services/Referral (Occupational Profile) ADL decline  -[facility] Name 04/11/25 1221          Living Environment    Current Living Arrangements home  -     People in Home grandchild(pearl)  -[facility] Name 04/11/25 1221          Home Main Entrance    Number of Stairs, Main Entrance none  -[facility] Name 04/11/25 1221          Stairs Within Home, Primary    Stairs, Within Home, Primary to second level where full bathroom is located, pt reports she hasn't gone upstairs for a while, she sleeps on the couch  -     Number of Stairs, Within Home, Primary twelve  -     Stair Railings, Within Home, Primary railing on left side (ascending)  -[facility] Name 04/11/25 1221          Cognition    Orientation Status (Cognition) oriented x 4  -[facility] Name 04/11/25 1221          Safety Issues/Impairments Affecting Functional Mobility    Safety Issues Affecting Function (Mobility) safety precaution awareness;safety precautions follow-through/compliance  -     Impairments Affecting Function (Mobility) balance;endurance/activity tolerance;pain;strength  -               User Key  (r) = Recorded By, (t) = Taken By, (c) = Cosigned By      Initials Name Provider Type     Jazmin Vergara Occupational Therapist                     Mobility/ADL's       [facility] Name 04/11/25 1229          Bed Mobility    Bed Mobility bed mobility (all) activities  -     All Activities, [place] (Bed Mobility) moderate assist (50% patient effort)  -     Assistive Device (Bed Mobility) head of bed elevated;bed rails  -       Row Name 04/11/25 1229          Transfers    Transfers sit-stand transfer;bed-chair transfer  -[facility] Name 04/11/25 1229          Bed-Chair Transfer     Bed-Chair [place] (Transfers) minimum assist (75% patient effort)  -     Assistive Device (Bed-Chair Transfers) other (see comments)  -     Comment, (Bed-Chair Transfer) HHA and gait belt  -       Row Name 04/11/25 1229          Sit-Stand Transfer    Sit-Stand [place] (Transfers) minimum assist (75% patient effort)  -     Assistive Device (Sit-Stand Transfers) walker, front-wheeled  -       Row Name 04/11/25 1229          Functional Mobility    Functional Mobility- Ind. Level minimum assist (75% patient effort)  -     Functional Mobility-Distance (Feet) 3  -     Functional Mobility- Safety Issues supplemental O2  -     Patient was able to Ambulate yes  -[facility] Name 04/11/25 1229          Activities of Daily Living    BADL Assessment/Intervention bathing;upper body dressing;lower body dressing;grooming;feeding;toileting  -[facility] Name 04/11/25 1229          Bathing Assessment/Intervention    [place] Level (Bathing) moderate assist (50% patient effort)  -[facility] Name 04/11/25 1229          Upper Body Dressing Assessment/Training    [place] Level (Upper Body Dressing) set up  -[facility] Name 04/11/25 1229          Lower Body Dressing Assessment/Training    [place] Level (Lower Body Dressing) maximum assist (25% patient effort)  -[facility] Name 04/11/25 1229          Grooming Assessment/Training    [place] Level (Grooming) set up  -[facility] Name 04/11/25 1229          Self-Feeding Assessment/Training    [place] Level (Feeding) independent  -[facility] Name 04/11/25 1229          Toileting Assessment/Training    [place] Level (Toileting) maximum assist (25% patient effort)  -               User Key  (r) = Recorded By, (t) = Taken By, (c) = Cosigned By      Initials Name Provider Type     Jazmin Vergara Occupational Therapist                   Obj/Interventions       Row Name 04/11/25 1230          Sensory Assessment (Somatosensory)     Sensory Assessment (Somatosensory) sensation intact  -AH       Row Name 04/11/25 1230          Vision Assessment/Intervention    Visual Impairment/Limitations WFL  -AH       Row Name 04/11/25 1230          Range of Motion Comprehensive    General Range of Motion bilateral upper extremity ROM WFL  -AH       Row Name 04/11/25 1230          Strength Comprehensive (MMT)    Comment, General Manual Muscle Testing (MMT) Assessment BUE 4-/5  -               User Key  (r) = Recorded By, (t) = Taken By, (c) = Cosigned By      Initials Name Provider Type    Jazmin Hager Occupational Therapist                   Goals/Plan       Row Name 04/11/25 1252          Bed Mobility Goal 1 (OT)    Activity/Assistive Device (Bed Mobility Goal 1, OT) bed mobility activities, all  -     [place] Level/Cues Needed (Bed Mobility Goal 1, OT) minimum assist (75% or more patient effort)  -     Time Frame (Bed Mobility Goal 1, OT) by discharge  -     Progress/Outcomes (Bed Mobility Goal 1, OT) goal ongoing  -AH       Row Name 04/11/25 1252          Transfer Goal 1 (OT)    Activity/Assistive Device (Transfer Goal 1, OT) sit-to-stand/stand-to-sit;walker, rolling  -     [place] Level/Cues Needed (Transfer Goal 1, OT) contact guard required  -     Time Frame (Transfer Goal 1, OT) by discharge  -     Progress/Outcome (Transfer Goal 1, OT) goal ongoing  -AH       Row Name 04/11/25 1252          Bathing Goal 1 (OT)    Activity/Device (Bathing Goal 1, OT) bathing skills, all  -     [place] Level/Cues Needed (Bathing Goal 1, OT) minimum assist (75% or more patient effort)  -     Time Frame (Bathing Goal 1, OT) by discharge  -     Progress/Outcomes (Bathing Goal 1, OT) goal ongoing  -AH       Row Name 04/11/25 1252          Dressing Goal 1 (OT)    Activity/Device (Dressing Goal 1, OT) lower body dressing  -     [place]/Cues Needed (Dressing Goal 1, OT) minimum assist (75% or more patient effort)  -     Time  Frame (Dressing Goal 1, OT) long term goal (LTG);1 week  -     Progress/Outcome (Dressing Goal 1, OT) goal ongoing  -       Row Name 04/11/25 1252          Toileting Goal 1 (OT)    Activity/Device (Toileting Goal 1, OT) adjust/manage clothing;perform perineal hygiene  -     [place] Level/Cues Needed (Toileting Goal 1, OT) minimum assist (75% or more patient effort)  -     Time Frame (Toileting Goal 1, OT) by discharge  -     Progress/Outcome (Toileting Goal 1, OT) goal ongoing  -       Row Name 04/11/25 1252          Strength Goal 1 (OT)    Strength Goal 1 (OT) Pt will perform UB strengthening ex using theraband for resistance.  -     Time Frame (Strength Goal 1, OT) by discharge  -     Progress/Outcome (Strength Goal 1, OT) goal ongoing  -[facility] Name 04/11/25 1252          Therapy Assessment/Plan (OT)    Planned Therapy Interventions (OT) activity tolerance training;BADL retraining;patient/caregiver education/training;transfer/mobility retraining;strengthening exercise  -               User Key  (r) = Recorded By, (t) = Taken By, (c) = Cosigned By      Initials Name Provider Type     Jazmin Vergara Occupational Therapist                   Clinical Impression       Row Name 04/11/25 1230          Pain Assessment    Pretreatment Pain Rating 0/10 - no pain  -     Posttreatment Pain Rating 5/10  -     Pain Location extremity  -     Pain Side/Orientation bilateral;lower  -     Pain Management Interventions exercise or physical activity utilized  -     Response to Pain Interventions activity participation with increased pain  -     Pre/Posttreatment Pain Comment pt reports pain increasing to 10/10 with activity  -[facility] Name 04/11/25 1230          Plan of Care Review    Plan of Care Reviewed With patient  -     Progress no change  -     Outcome Evaluation Pt seen for OT evaluation today.  Pt lives at home with her granddaughter and her family.  She reports she is  normally able to take care of herself.  She sleeps on the couch and sponge bathes, but reports is has been getting more difficult.   Pt required mod assist to sit eob, min assist to stand and min assist to transfer to the chair ~3'.  Pt reported increased pain with activity in both legs, but expecially her R leg.  Pt is mod/max assist for bathing, dressing and toileting.  Pt is expected to benefit from skilled OT to improve her strength, activity tolerance and independence with ADL tasks.  Pt would benefit from STR d/t pain, weakness and decreased ability to appropriately care for herself.  -       Row Name 04/11/25 1230          Therapy Assessment/Plan (OT)    Patient/Family Therapy Goal Statement (OT) d/c rehab  -     Rehab Potential (OT) good  -     Criteria for Skilled Therapeutic Interventions Met (OT) yes;skilled treatment is necessary  -     Therapy Frequency (OT) 3 times/wk  -       Row Name 04/11/25 1230          Therapy Plan Review/Discharge Plan (OT)    Anticipated Discharge Disposition (OT) sub acute care setting  -       Row Name 04/11/25 1230          Positioning and Restraints    Pre-Treatment Position in bed  -     Post Treatment Position chair  -     In Chair sitting;call light within reach;encouraged to call for assist  -               User Key  (r) = Recorded By, (t) = Taken By, (c) = Cosigned By      Initials Name Provider Type    Jazmin Hager Occupational Therapist                   Outcome Measures       Row Name 04/11/25 3107          How much help from another is currently needed...    Putting on and taking off regular lower body clothing? 2  -AH     Bathing (including washing, rinsing, and drying) 2  -AH     Toileting (which includes using toilet bed pan or urinal) 2  -AH     Putting on and taking off regular upper body clothing 3  -AH     Taking care of personal grooming (such as brushing teeth) 3  -AH     Eating meals 3  -AH     AM-PAC 6 Clicks Score (OT) 15  -AH        Row Name 04/11/25 0800          How much help from another person do you currently need...    Turning from your back to your side while in flat bed without using bedrails? 4  -KJ     Moving from lying on back to sitting on the side of a flat bed without bedrails? 3  -KJ     Moving to and from a bed to a chair (including a wheelchair)? 3  -KJ     Standing up from a chair using your arms (e.g., wheelchair, bedside chair)? 2  -KJ     Climbing 3-5 steps with a railing? 1  -KJ     To walk in hospital room? 2  -KJ     AM-PAC 6 Clicks Score (PT) 15  -KJ       Row Name 04/11/25 1255          Functional Assessment    Outcome Measure Options AM-PAC 6 Clicks Daily Activity (OT)  -               User Key  (r) = Recorded By, (t) = Taken By, (c) = Cosigned By      Initials Name Provider Type     Jazmin Vergara Occupational Therapist    Carmenza Blakely, RN Registered Nurse                    Occupational Therapy Education       Title: PT OT SLP Therapies (In Progress)       Topic: Occupational Therapy (In Progress)       Point: ADL training (Done)       Learning Progress Summary            Patient Acceptance, E,TB, VU by  at 4/11/2025 1256    Comment: Role of OT/POC                                      User Key       Initials Effective Dates Name Provider Type Discipline     06/16/21 -  Jazmin Vergara Occupational Therapist OT                  OT Recommendation and Plan  Planned Therapy Interventions (OT): activity tolerance training, BADL retraining, patient/caregiver education/training, transfer/mobility retraining, strengthening exercise  Therapy Frequency (OT): 3 times/wk  Plan of Care Review  Plan of Care Reviewed With: patient  Progress: no change  Outcome Evaluation: Pt seen for OT evaluation today.  Pt lives at home with her granddaughter and her family.  She reports she is normally able to take care of herself.  She sleeps on the couch and sponge bathes, but reports is has been getting more difficult.   Pt  required mod assist to sit eob, min assist to stand and min assist to transfer to the chair ~3'.  Pt reported increased pain with activity in both legs, but expecially her R leg.  Pt is mod/max assist for bathing, dressing and toileting.  Pt is expected to benefit from skilled OT to improve her strength, activity tolerance and independence with ADL tasks.  Pt would benefit from STR d/t pain, weakness and decreased ability to appropriately care for herself.     Time Calculation:   Evaluation Complexity (OT)  Review Occupational Profile/Medical/Therapy History Complexity: expanded/moderate complexity  Assessment, Occupational Performance/Identification of Deficit Complexity: 3-5 performance deficits  Clinical Decision Making Complexity (OT): detailed assessment/moderate complexity  Overall Complexity of Evaluation (OT): moderate complexity     Time Calculation- OT       Row Name 04/11/25 [facility]             Time Calculation- OT    OT Start Time 1115  -AH      OT Received On 04/11/25  -      OT Goal Re-Cert Due Date 04/21/25  -         Untimed Charges    OT Eval/Re-eval Minutes 53  -AH         Total Minutes    Untimed Charges Total Minutes 53  -AH       Total Minutes 53  -AH                User Key  (r) = Recorded By, (t) = Taken By, (c) = Cosigned By      Initials Name Provider Type     Jazmin Vergara Occupational Therapist                  Therapy Charges for Today       Code Description Service Date Service Provider Modifiers Qty    [id number]  OT EVAL MOD COMPLEXITY 4 4/11/2025 Jazmin Vergara GO 1                 Jazmin Vergara  4/11/2025

## 2025-04-11 NOTE — CASE MANAGEMENT/SOCIAL WORK
Case Management/Social Work    Patient Name:  Patsy Frazier  YOB: 1960  MRN: [id number]  Admit Date:  4/10/2025      STR Referrals sent to [facility] and [facility] at the request of the pt and family. Sw to follow for placement.       Electronically signed by:  NANCY Gill  04/11/25 14:32 EDT

## 2025-04-11 NOTE — THERAPY EVALUATION
"Patient Name: Patsy Frazier  : 1960    MRN: [id number]                              Today's Date: 2025       Admit Date: 4/10/2025    Visit Dx:     ICD-10-CM ICD-9-CM   1. Peripheral edema  R60.0 782.3   2. Wound of left lower extremity, initial encounter  S81.802A 894.0   3. Wound of right lower extremity, initial encounter  S81.801A 894.0   4. Bilateral cellulitis of lower leg  L03.116 682.6    L03.115      Patient Active Problem List   Diagnosis    Atrial flutter with rapid ventricular response    Tobacco abuse    SVT (supraventricular tachycardia)    Atrial flutter    Acute hypoxic respiratory failure    Hypoxia    COPD (chronic obstructive pulmonary disease)    Bilateral lower extremity edema    Weakness     Past Medical History:   Diagnosis Date    Arthritis     Asthma     CHF (congestive heart failure)     COPD (chronic obstructive pulmonary disease)     Hypothyroidism     Stroke     \"mini stroke\" at the age of 16, no deficits per pt report     SVT (supraventricular tachycardia)     prior hx of     Wears glasses      Past Surgical History:   Procedure Laterality Date    CARDIAC ELECTROPHYSIOLOGY PROCEDURE N/A 11/3/2017    Procedure: Ablation atrial flutter vs SVT;  Surgeon: Chavo Carr MD;  Location: [facility] INVASIVE LOCATION;  Service:     CHOLECYSTECTOMY      NECK SURGERY      TUBAL ABDOMINAL LIGATION        General Information       Row Name 25 1322          Physical Therapy Time and Intention    Document Type evaluation  -     Mode of Treatment physical therapy  -       Row Name 25 1322          General Information    Patient Profile Reviewed yes  -MK     Prior Level of Function independent:;ADL's;all household mobility  -     Existing Precautions/Restrictions fall  -     Barriers to Rehab medically complex;previous functional deficit  -       Row Name 25 1322          Living Environment    Current Living Arrangements home  -     " People in Home grandchild(pearl)  -       Row Name 04/11/25 1322          Home Main Entrance    Number of Stairs, Main Entrance none  -[facility] Name 04/11/25 1322          Stairs Within Home, Primary    Stairs, Within Home, Primary stairs to second level, pt sleeps on the couch on the first floor  -     Number of Stairs, Within Home, Primary twelve  -     Stair Railings, Within Home, Primary railing on left side (ascending)  -[facility] Name 04/11/25 1322          Cognition    Orientation Status (Cognition) oriented x 4  -[facility] Name 04/11/25 1322          Safety Issues/Impairments Affecting Functional Mobility    Safety Issues Affecting Function (Mobility) safety precautions follow-through/compliance;safety precaution awareness  -     Impairments Affecting Function (Mobility) balance;endurance/activity tolerance;pain;strength  -               User Key  (r) = Recorded By, (t) = Taken By, (c) = Cosigned By      Initials Name Provider Type     Fabio Denton, PT Physical Therapist                   Mobility       Row Name 04/11/25 1323          Bed Mobility    Bed Mobility bed mobility (all) activities  -     All Activities, [place] (Bed Mobility) moderate assist (50% patient effort)  -     Assistive Device (Bed Mobility) head of bed elevated;bed rails  -       Row Name 04/11/25 1323          Bed-Chair Transfer    Bed-Chair [place] (Transfers) minimum assist (75% patient effort)  -     Assistive Device (Bed-Chair Transfers) other (see comments)  -     Comment, (Bed-Chair Transfer) HHA and gait belt  -       Row Name 04/11/25 1323          Sit-Stand Transfer    Sit-Stand [place] (Transfers) minimum assist (75% patient effort)  -     Assistive Device (Sit-Stand Transfers) walker, front-wheeled  -       Row Name 04/11/25 1323          Gait/Stairs (Locomotion)    Patient was able to Ambulate yes  -     Distance in Feet (Gait) 3  -MK     Bilateral Gait Deviations  forward flexed posture;weight shift ability decreased  -               User Key  (r) = Recorded By, (t) = Taken By, (c) = Cosigned By      Initials Name Provider Type    Fabio Donovan PT Physical Therapist                   Obj/Interventions       Row Name 04/11/25 1324          Range of Motion Comprehensive    General Range of Motion no range of motion deficits identified  -MK       Row Name 04/11/25 1324          Strength Comprehensive (MMT)    General Manual Muscle Testing (MMT) Assessment lower extremity strength deficits identified  -     Comment, General Manual Muscle Testing (MMT) Assessment B LE 4/5  -MK       Row Name 04/11/25 1324          Balance    Balance Assessment sitting static balance;sitting dynamic balance;standing static balance;standing dynamic balance  -     Static Sitting Balance standby assist  -     Dynamic Sitting Balance moderate assist  -     Position, Sitting Balance unsupported;sitting edge of bed  -     Static Standing Balance minimal assist  -     Dynamic Standing Balance minimal assist  -     Position/Device Used, Standing Balance walker, front-wheeled  -     Balance Interventions sitting;standing;sit to stand  -               User Key  (r) = Recorded By, (t) = Taken By, (c) = Cosigned By      Initials Name Provider Type    Fabio Donovan PT Physical Therapist                   Goals/Plan       Row Name 04/11/25 1328          Bed Mobility Goal 1 (PT)    Activity/Assistive Device (Bed Mobility Goal 1, PT) bed mobility activities, all  -     [place] Level/Cues Needed (Bed Mobility Goal 1, PT) minimum assist (75% or more patient effort)  -     Time Frame (Bed Mobility Goal 1, PT) short term goal (STG);5 days  -     Progress/Outcomes (Bed Mobility Goal 1, PT) new goal  -MK       Row Name 04/11/25 1328          Transfer Goal 1 (PT)    Activity/Assistive Device (Transfer Goal 1, PT) sit-to-stand/stand-to-sit  -     [place] Level/Cues Needed  (Transfer Goal 1, PT) minimum assist (75% or more patient effort)  -MK     Time Frame (Transfer Goal 1, PT) long term goal (LTG);10 days  -MK     Progress/Outcome (Transfer Goal 1, PT) new goal  -       Row Name 04/11/25 1328          Gait Training Goal 1 (PT)    Activity/Assistive Device (Gait Training Goal 1, PT) gait (walking locomotion)  -     [place] Level (Gait Training Goal 1, PT) minimum assist (75% or more patient effort)  -MK     Distance (Gait Training Goal 1, PT) 25 ft  -MK     Time Frame (Gait Training Goal 1, PT) long term goal (LTG);10 days  -MK     Progress/Outcome (Gait Training Goal 1, PT) new goal  -       Row Name 04/11/25 1328          Patient Education Goal (PT)    Activity (Patient Education Goal, PT) Pt to participate in B LE ther ex at least 3x per week  -MK     [place]/Cues/Accuracy (Memory Goal 2, PT) demonstrates adequately  -MK     Time Frame (Patient Education Goal, PT) long term goal (LTG);10 days  -MK     Progress/Outcome (Patient Education Goal, PT) new goal  -       Row Name 04/11/25 9837          Therapy Assessment/Plan (PT)    Planned Therapy Interventions (PT) balance training;bed mobility training;gait training;home exercise program;motor coordination training;neuromuscular re-education;patient/family education;strengthening;transfer training  -               User Key  (r) = Recorded By, (t) = Taken By, (c) = Cosigned By      Initials Name Provider Type     Fabio Denton, PT Physical Therapist                   Clinical Impression       Row Name 04/11/25 9668          Pain    Pretreatment Pain Rating 0/10 - no pain  -     Posttreatment Pain Rating 5/10  -     Pain Location extremity  -     Pain Side/Orientation bilateral;lower  -MK     Pain Management Interventions exercise or physical activity utilized;activity modification encouraged  -     Response to Pain Interventions activity participation with increased pain  -       Row Name 04/11/25 1575           Plan of Care Review    Plan of Care Reviewed With patient  -MK     Progress no change  -     Outcome Evaluation pt participated in PT evaluation this date. Pt lives with her granddaughter and family. Pt reports that she is previously IND with all ADLs and mobility. Pt required mod A for bed mobility and sat EOB SBA. pt completed STS min A using RW and was able to tf to bedside chair min A. pt reports that she is unable to walk further at this point. Pt reports increased pain in B LEs, especially R LE. pt is expected to benefit from skilled PT during this inpatient stay to maximize functional mobility and IND. pt would further benefit from STR upon dc.  -       Row Name 04/11/25 1325          Therapy Assessment/Plan (PT)    Patient/Family Therapy Goals Statement (PT) pt would like to go to [facility]  -     Rehab Potential (PT) good  -     Criteria for Skilled Interventions Met (PT) yes;skilled treatment is necessary  -     Therapy Frequency (PT) 5 times/wk  -     Predicted Duration of Therapy Intervention (PT) 2 weeks  -       Row Name 04/11/25 1325          Vital Signs    O2 Delivery Pre Treatment supplemental O2  -     O2 Delivery Intra Treatment supplemental O2  -MK     O2 Delivery Post Treatment supplemental O2  -MK     Pre Patient Position Supine  -     Intra Patient Position Standing  -     Post Patient Position Sitting  -       Row Name 04/11/25 1325          Positioning and Restraints    Pre-Treatment Position in bed  -     Post Treatment Position chair  -     In Chair sitting;call light within reach;encouraged to call for assist  -               User Key  (r) = Recorded By, (t) = Taken By, (c) = Cosigned By      Initials Name Provider Type     Fabio Denton, PT Physical Therapist                   Outcome Measures       Row Name 04/11/25 1330 04/11/25 0800       How much help from another person do you currently need...    Turning from your back to your side while in flat bed  without using bedrails? 4  - 4  -KJ    Moving from lying on back to sitting on the side of a flat bed without bedrails? 3  - 3  -KJ    Moving to and from a bed to a chair (including a wheelchair)? 3  - 3  -KJ    Standing up from a chair using your arms (e.g., wheelchair, bedside chair)? 2  - 2  -KJ    Climbing 3-5 steps with a railing? 1  - 1  -KJ    To walk in hospital room? 2  - 2  -KJ    AM-PAC 6 Clicks Score (PT) 15  - 15  -KJ    Highest Level of Mobility Goal 4 --> Transfer to chair/commode  - 4 --> Transfer to chair/commode  -      Row Name 04/11/25 1330 04/11/25 1255       Functional Assessment    Outcome Measure Options AM-[facility] 6 Clicks Basic Mobility (PT)  -[facility] 6 Clicks Daily Activity (OT)  -              User Key  (r) = Recorded By, (t) = Taken By, (c) = Cosigned By      Initials Name Provider Type     Jazmin Vergara Occupational Therapist     Fabio Denton, PT Physical Therapist    Carmenza Blakely, RN Registered Nurse                                 Physical Therapy Education       Title: PT OT SLP Therapies (In Progress)       Topic: Physical Therapy (In Progress)       Point: Mobility training (Done)       Learning Progress Summary            Patient Acceptance, E,FERN STALLWORTH,VU by  at 4/11/2025 1331    Comment: educated on purpose of PT and importance of mobility                      Point: Home exercise program (Not Started)       Learner Progress:  Not documented in this visit.              Point: Body mechanics (Done)       Learning Progress Summary            Patient Acceptance, E,D, FERN,VU by  at 4/11/2025 1331    Comment: educated on purpose of PT and importance of mobility                      Point: Precautions (Not Started)       Learner Progress:  Not documented in this visit.                              User Key       Initials Effective Dates Name Provider Type [facility] 06/13/23 -  Fabio Denton, PT Physical Therapist PT                  PT Recommendation  and Plan  Planned Therapy Interventions (PT): balance training, bed mobility training, gait training, home exercise program, motor coordination training, neuromuscular re-education, patient/family education, strengthening, transfer training  Progress: no change  Outcome Evaluation: pt participated in PT evaluation this date. Pt lives with her granddaughter and family. Pt reports that she is previously IND with all ADLs and mobility. Pt required mod A for bed mobility and sat EOB SBA. pt completed STS min A using RW and was able to tf to bedside chair min A. pt reports that she is unable to walk further at this point. Pt reports increased pain in B LEs, especially R LE. pt is expected to benefit from skilled PT during this inpatient stay to maximize functional mobility and IND. pt would further benefit from STR upon dc.     Time Calculation:   PT Evaluation Complexity  History, PT Evaluation Complexity: 1-2 personal factors and/or comorbidities  Examination of Body Systems (PT Eval Complexity): total of 3 or more elements  Clinical Presentation (PT Evaluation Complexity): evolving  Clinical Decision Making (PT Evaluation Complexity): moderate complexity  Overall Complexity (PT Evaluation Complexity): moderate complexity     PT Charges       Row Name 04/11/25 1114             Time Calculation    Start Time 1114  -MK      PT Received On 04/11/25  -MK      PT Goal Re-Cert Due Date 04/21/25  -MK         Untimed Charges    PT Eval/Re-eval Minutes 55  -MK         Total Minutes    Untimed Charges Total Minutes 55  -MK       Total Minutes 55  -MK                User Key  (r) = Recorded By, (t) = Taken By, (c) = Cosigned By      Initials Name Provider Type    Fabio Donovan, PT Physical Therapist                  Therapy Charges for Today       Code Description Service Date Service Provider Modifiers Qty    [id number]  PT EVAL MOD COMPLEXITY 4 4/11/2025 Fabio Denton, PT GP 1            PT G-Codes  Outcome Measure  Options: AM-PAC 6 Clicks Basic Mobility (PT)  AM-PAC 6 Clicks Score (PT): 15  AM-PAC 6 Clicks Score (OT): 15  PT Discharge Summary  Anticipated Discharge Disposition (PT): inpatient rehabilitation facility    Fabio Denton, XENIA  4/11/2025

## 2025-04-11 NOTE — NURSING NOTE
Seen for wound consult. Pleasant and cooperative with assessment. Carmenza NEGRETE assisted.   Lower abdominal folds red changes consistent with intertrigo. Standing orders and dry sheets placed.  Bilateral lower legs red, edematous, patient reports they are sore and have been for a few weeks. Ankles and feet are dry. Orders to Clean with wound cleanser, pat dry, apply aquaphor, place non-adherent gauze over open areas, then abd pad and wrap with rolled gauze every 12 hours.   Standing orders for care include a turning schedule, barrier cream twice daily and prn after cleansing, using dry sheets in folds if moisture noted, float heels off bed with pillows, encourage increase mobility as appropriate and tolerated to reduce pressure, for dry skin apply lotion/cream and a nutrition consult for dietary needs. Staff to contact provider and re-consult wound nurse for new skin issues or lack of improvement with current orders. Thank you for the consult. If you have questions or concerns do not hesitate to contact me.

## 2025-04-11 NOTE — PLAN OF CARE
Goal Outcome Evaluation:  Plan of Care Reviewed With: patient      VSS, pt on 3 L NC maintaining sats >90%, and pt nontele att. Medications administered per orders. Wound care completed. Pt worked with PT during shift, sat up in chair. Discharge is pending.

## 2025-04-11 NOTE — CONSULTS
"Dietitian Assessment    Patient Name: Patsy Frazier  YOB: 1960  MRN: [id number]  Admission date: 4/10/2025    Comment:        Clinical Nutrition Assessment      Reason for Assessment Calorie count   H&P  Past Medical History:   Diagnosis Date    Arthritis     Asthma     CHF (congestive heart failure)     COPD (chronic obstructive pulmonary disease)     Hypothyroidism     Stroke 1976    \"mini stroke\" at the age of 16, no deficits per pt report     SVT (supraventricular tachycardia)     prior hx of     Wears glasses        Past Surgical History:   Procedure Laterality Date    CARDIAC ELECTROPHYSIOLOGY PROCEDURE N/A 11/3/2017    Procedure: Ablation atrial flutter vs SVT;  Surgeon: Chavo Carr MD;  Location: [facility] INVASIVE LOCATION;  Service:     CHOLECYSTECTOMY  2010    NECK SURGERY  2007    TUBAL ABDOMINAL LIGATION  1981            Current Problems   Patient Active Problem List   Diagnosis    Atrial flutter with rapid ventricular response    Tobacco abuse    SVT (supraventricular tachycardia)    Atrial flutter    Acute hypoxic respiratory failure    Hypoxia    COPD (chronic obstructive pulmonary disease)    Bilateral lower extremity edema    Weakness        Encounter Information        Trending Narrative     4/11: RD consulted for calorie count - patient on several dietary restrictions. Calorie count not warranted at this time.      Anthropometrics        Current Height, Weight Height: 172.7 cm (68\")  Weight: (!) 140 kg (309 lb 11.9 oz) (04/11/25 0430)   Trending Weight Hx     This admission:              PTA:     Wt Readings from Last 30 Encounters:   04/11/25 0430 (!) 140 kg (309 lb 11.9 oz)   04/10/25 1930 (!) 141 kg (310 lb 13.6 oz)   04/10/25 1202 136 kg (300 lb)   05/13/24 0322 133 kg (293 lb 10.4 oz)   05/12/24 0510 135 kg (297 lb 9.9 oz)   05/11/24 0400 (!) 139 kg (306 lb 3.5 oz)   05/10/24 1653 (!) 139 kg (307 lb)   05/10/24 1511 (!) 140 kg (307 lb 12.2 oz)   05/10/24 1251 (!) " 143 kg (315 lb)   11/03/17 1218 116 kg (255 lb 8.2 oz)   10/05/17 1425 118 kg (261 lb)   09/29/17 0533 118 kg (261 lb 3.2 oz)   09/28/17 1545 119 kg (263 lb 4 oz)   09/28/17 1020 122 kg (270 lb)      BMI kg/m2 Body mass index is 47.1 kg/m².     Labs        Pertinent Labs     Results from last 7 days   Lab Units 04/11/25  0547 04/10/25  1842 04/10/25  1217   SODIUM mmol/L 135* 140 141   POTASSIUM mmol/L 4.5 4.7 4.4   CHLORIDE mmol/L 98 99 99   CO2 mmol/L 30.8* 33.0* 33.7*   BUN mg/dL 25* 26* 26*   CREATININE mg/dL 0.57 0.65 0.63   CALCIUM mg/dL 8.7 9.3 9.4   BILIRUBIN mg/dL  --   --  0.2   ALK PHOS U/L  --   --  68   ALT (SGPT) U/L  --   --  11   AST (SGOT) U/L  --   --  12   GLUCOSE mg/dL 107* 112* 137*       Results from last 7 days   Lab Units 04/11/25  0547 04/10/25  1842   MAGNESIUM mg/dL 2.0 2.2   PHOSPHORUS mg/dL 3.1 2.9   HEMOGLOBIN g/dL 10.3* 11.7*   HEMATOCRIT % 33.2* 37.3       Lab Results   Component Value Date    HGBA1C 6.10 (H) 11/02/2017            Medications       Scheduled Medications arformoterol, 15 mcg, Nebulization, BID - RT   And  revefenacin, 175 mcg, Nebulization, Daily - RT  cefTRIAXone, 2,000 mg, Intravenous, Q24H  doxycycline, 100 mg, Intravenous, Q12H  furosemide, 80 mg, Oral, Daily  levothyroxine, 200 mcg, Oral, QAM  levothyroxine, 88 mcg, Oral, Daily  metoprolol tartrate, 50 mg, Oral, BID  rivaroxaban, 20 mg, Oral, Daily With Dinner  sodium chloride, 10 mL, Intravenous, Q12H        Infusions Pharmacy to Dose enoxaparin (LOVENOX),          PRN Medications   albuterol    Calcium Replacement - Follow Nurse / BPA Driven Protocol    Magnesium Standard Dose Replacement - Follow Nurse / BPA Driven Protocol    ondansetron    Pharmacy to Dose enoxaparin (LOVENOX)    Phosphorus Replacement - Follow Nurse / BPA Driven Protocol    Potassium Replacement - Follow Nurse / BPA Driven Protocol    sodium chloride    sodium chloride    sodium chloride     Physical Findings        Trending Physical    Appearance, NFPE    --  Edema  4+ (severe)   Bowel Function None   Tubes Central Line/PICC   Chewing/Swallowing WNL   Skin Intact     Estimated/Assessed Needs       Energy Requirements    EST Needs, Method, Wt used 1540-1960kcals/day     11-14 kcal/kg   Protein Requirements    EST Needs, Method, Wt used 112g protein/day    0.8 gm/kg   Fluid Requirements     Estimated Needs (mL/day) 1960mL per day     1 mL/kcal       Current Nutrition Orders & Evaluation of Intake       Oral Nutrition     Food Allergies    Current PO Diet Diet: Cardiac, Diabetic, Renal; Healthy Heart (2-3 Na+); Consistent Carbohydrate; Low Sodium (2-3g), Low Potassium, Low Phosphorus; Fluid Consistency: Thin (IDDSI 0)   Supplement    PO Evaluation     Trending % PO Intake 4/11: no po intake reported      Enteral Nutrition    Enteral Route    Order, Modulars, Flushes    Residual/Tolerance    TF Observation         Parenteral Nutrition     TPN Route    Total # Days on TPN    TPN Order, Lipid Details    MVI & Trace Element Freq    TPN Observation       Nutrition Diagnosis         Nutrition Dx Problem 1 Overweight/obese r/t diet and physical activity imbalance as evidenced by BMI of 47.10      Nutrition Dx Problem 2        Intervention Goal         Intervention Goal(s) PO intake to meet >50% of estimated needs  Appropriate weight loss of 1-2# per week     Nutrition Intervention        RD Action Care plan reviewed     Nutrition Prescription          Diet Prescription Diet: Cardiac, Diabetic, Renal; Healthy Heart (2-3 Na+); Consistent Carbohydrate; Low Sodium (2-3g), Low Potassium, Low Phosphorus; Fluid Consistency: Thin (IDDSI 0)   Supplement Prescription      Enteral Prescription        TPN Prescription      Monitor/Evaluation        Monitor Per protocol, PO intake, Pertinent labs, Weight, Skin status, GI status, Symptoms, Hemodynamic stability     RD to follow-up.    Electronically signed by:  Fabiana Salazar RD  04/11/25 08:16 EDT

## 2025-04-11 NOTE — PLAN OF CARE
Goal Outcome Evaluation:  Plan of Care Reviewed With: patient        Progress: no change  Outcome Evaluation: pt participated in PT evaluation this date. Pt lives with her granddaughter and family. Pt reports that she is previously IND with all ADLs and mobility. Pt required mod A for bed mobility and sat EOB SBA. pt completed STS min A using RW and was able to tf to bedside chair min A. pt reports that she is unable to walk further at this point. Pt reports increased pain in B LEs, especially R LE. pt is expected to benefit from skilled PT during this inpatient stay to maximize functional mobility and IND. pt would further benefit from STR upon dc.    Anticipated Discharge Disposition (PT): inpatient rehabilitation facility

## 2025-04-11 NOTE — CASE MANAGEMENT/SOCIAL WORK
Discharge Planning Assessment   Ronal     Patient Name: Patsy Frazier  MRN: [id number]  Today's Date: 4/11/2025    Admit Date: 4/10/2025        Discharge Needs Assessment       Row Name 04/11/25 1024       Living Environment    People in Home grandchild(pearl)    Unique Family Situation pt lives with her granddaughter and her family    Duration at Residence 1 yr    Potentially Unsafe Housing Conditions none    In the past 12 months has the electric, gas, oil, or water company threatened to shut off services in your home? No    Primary Care Provided by self    Provides Primary Care For no one, unable/limited ability to care for self    Family Caregiver if Needed grandchild(pearl), adult;child(pearl), adult    Quality of Family Relationships involved    Able to Return to Prior Arrangements no       Transportation Needs    In the past 12 months, has lack of transportation kept you from medical appointments or from getting medications? no    In the past 12 months, has lack of transportation kept you from meetings, work, or from getting things needed for daily living? Yes       Food Insecurity    Within the past 12 months, you worried that your food would run out before you got the money to buy more. Never true    Within the past 12 months, the food you bought just didn't last and you didn't have money to get more. Never true       Discharge Needs Assessment    Readmission Within the Last 30 Days no previous admission in last 30 days    Concerns to be Addressed discharge planning    Do you want help finding or keeping work or a job? I do not need or want help    Do you want help with school or training? For example, starting or completing job training or getting a high school diploma, GED or equivalent No    Anticipated Changes Related to Illness none    Equipment Needed After Discharge none    Discharge Facility/Level of Care Needs nursing facility, skilled                   Discharge Plan       Row Name 04/11/25  8180       Plan    Plan Comments Pt provided demographics for dcp. She does not have an AD,POA, or financial stressors. She has a rollator, bath bench, and O2 provided by [employer]. Jennifer Ramirez is her primary adn [facility] preferred pharmacy. She has lived with her grand daughter and her family the past year. She requests STR in [place], if na then [place].                  Continued Care and Services - Admitted Since 4/10/2025       Home Medical Care       Service Provider Request Status Services Address Phone Fax Patient Preferred    [facility] Declined  Patient Insurance Not Accepted -- [address], [place] [id number] [phone] --    [facility] Declined  Patient Insurance Not Accepted -- [address], [place] [id number] [phone] --    [facility] Declined  Patient Insurance Not Accepted -- [address], [place] [id number] [phone] --    [facility]  Patient Insurance Not Accepted -- [address] [facility] [phone] --    [facility] Declined  Patient Insurance Not Accepted -- [address], [address], [place] [id number] [phone] --    [facility] Declined  Patient Insurance Not Accepted -- [address], [place] [address] [phone] --    [facility] Declined  Patient Insurance Not Accepted -- [address], [facility] [id number] [phone] -- --    [facility] Declined  Patient Insurance Not Accepted -- [address], [address], [place] [address] [phone] --                     Demographic Summary       Row Name 04/11/25 1022       General Information    Admission Type inpatient    Arrived From emergency department    Required Notices  Provided Important Message from Medicare    Referral Source admission list    Reason for Consult discharge planning    Preferred Language English       Contact Information    Permission Granted to Share Info With family/designee                   Functional Status       Row Name 04/11/25 1024       Functional Status    Usual Activity Tolerance fair    Current Activity Tolerance poor       Physical Activity    On average, how many days per week do you engage in moderate to strenuous exercise (like a brisk walk)? 0 days    On average, how many minutes do you engage in exercise at this level? 0 min    Number of minutes of exercise per week 0       Functional Status, IADL    Medications independent    Meal Preparation assistive equipment    Housekeeping assistive equipment and person    Laundry assistive equipment and person    Shopping assistive equipment and person    If for any reason you need help with day-to-day activities such as bathing, preparing meals, shopping, managing finances, etc., do you get the help you need? I get all the help I need       Mental Status    General Appearance WDL WDL       Mental Status Summary    Recent Changes in Mental Status/Cognitive Functioning no changes       Employment/    Employment Status disabled                   Psychosocial    No documentation.                  Abuse/Neglect    No documentation.                  Legal    No documentation.                  Substance Abuse    No documentation.                  Patient Forms    No documentation.                     Lorrie Herrera RN     2018

## 2025-04-11 NOTE — PROGRESS NOTES
[facility]    Name:  Patsy Frazier   Age:  64 y.o.  Sex:  female  :  1960  MRN:  [id number]   Visit Number:  [id number]  Admission Date:  4/10/2025  Date Of Service:  25  Primary Care Physician:  Eva Washburn APRN     LOS: 1 day :    Chief Complaint:      Generalized weakness and leg swelling.    Subjective:    Patsy Frazier was seen and examined this afternoon.  She is currently sitting up on the chair and is comfortable at rest.  She was seen by physical therapy due to her generalized weakness and they have recommended inpatient rehab.  Patient does have chronic lymphedema and states that she had significant redness and bruising in both lower extremities and she thinks the redness has significantly improved today.  Denies any fever, chest pain or shortness of breath.  Prior to admission, she lived with her granddaughters.  Previous physician documentation, laboratory and imaging data have been reviewed.    Hospital Course:    Patsy Frazier is a 64-year-old female, history of congestive heart failure, COPD, stroke, SVT and atrial flutter status post ablation on rivaroxaban, presented for evaluation of bilateral lower extremity pain and swelling.  This has been ongoing for multiple months patient reports that the pain and swelling has increased over the last couple of days more than it had been, decreased mobility because of this, she reports that she has been using pads wrapped around her leg to help with the weeping edema. Intermittent oxygen use at 3 L as needed, denies fevers.  Reports that this all started when she had wounds that were previously healed but accidentally nicked one of her legs and created an abrasion .  History of leg edema since age 18.    In the emergency room, she was afebrile and hemodynamically stable saturating at 97% on 3 L of nasal cannula oxygen.  Serial troponin and proBNP were within normal  range.  CMP was unremarkable except for glucose of 137.  Lactic acid, procalcitonin and CBC were unremarkable except for hemoglobin of 11.  Chest x-ray was unremarkable.  Venous duplex scan of lower extremities was negative for DVT.  Patient was noted to have bilateral lower extremity cellulitis and was initiated on antibody therapy with doxycycline and ceftriaxone.  Due to significant generalized weakness and inability to care for self, she was admitted to the medical floor for evaluation by case management for discharge planning.     Review of Systems:     All systems were reviewed and negative except as mentioned in subjective, assessment and plan.    Vital Signs:    Temp:  [97.9 °F (36.6 °C)-98.6 °F (37 °C)] 98.4 °F (36.9 °C)  Heart Rate:  [] 81  Resp:  [16-20] 16  BP: (106-141)/(47-67) 112/67    Intake and output:    No intake/output data recorded.  I/O this shift:  In: -   Out: 800 [Urine:800]    Physical Examination:    General Appearance:  Alert and cooperative.  Obese.   Head:  Atraumatic and normocephalic.   Eyes: Conjunctivae and sclerae normal, no icterus. No pallor.   Throat: No oral lesions, no thrush, oral mucosa moist.   Neck: Supple, trachea midline, no thyromegaly.   Lungs:   Breath sounds heard bilaterally equally.  No wheezing or crackles. No Pleural rub or bronchial breathing.   Heart:  Normal S1 and S2, no murmur, no gallop, no rub. No JVD.   Abdomen:   Normal bowel sounds, no masses, no organomegaly. Soft, nontender, obese with a large pannus, no rebound tenderness.   Extremities: Supple, no cyanosis, no clubbing.  3-4+ pitting edema noted bilateral lower extremities up to the knees.  Increased erythema noted bilateral lower legs with excoriations and eczema.  Please see nursing note and photographs in chart for further details.   Skin: No bleeding or rash.   Neurologic: Alert and oriented x 3. No facial asymmetry. Moves all four limbs but does have generalized weakness. No tremors.     Laboratory results:    Results from last 7 days   Lab Units 04/11/25  0547 04/10/25  1842 04/10/25  1217   SODIUM mmol/L 135* 140 141   POTASSIUM mmol/L 4.5 4.7 4.4   CHLORIDE mmol/L 98 99 99   CO2 mmol/L 30.8* 33.0* 33.7*   BUN mg/dL 25* 26* 26*   CREATININE mg/dL 0.57 0.65 0.63   CALCIUM mg/dL 8.7 9.3 9.4   BILIRUBIN mg/dL  --   --  0.2   ALK PHOS U/L  --   --  68   ALT (SGPT) U/L  --   --  11   AST (SGOT) U/L  --   --  12   GLUCOSE mg/dL 107* 112* 137*     Results from last 7 days   Lab Units 04/11/25  0547 04/10/25  1842 04/10/25  1217   WBC 10*3/mm3 5.90 8.41 7.67   HEMOGLOBIN g/dL 10.3* 11.7* 11.4*   HEMATOCRIT % 33.2* 37.3 37.2   PLATELETS 10*3/mm3 283 295 311         Results from last 7 days   Lab Units 04/10/25  1318 04/10/25  1217   HSTROP T ng/L 8 7       I have reviewed the patient's laboratory results.    Radiology results:    US Venous Doppler Lower Extremity Bilateral (duplex)  Result Date: 4/10/2025  BILATERAL LOWER EXTREMITY VENOUS DUPLEX DOPPLER EXAMINATION  HISTORY: Bilateral lower extremity swelling and pain, eval DVT  COMPARISON:   None  PROCEDURE: Multiple transverse and longitudinal scans were performed of both femoropopliteal deep venous system, with augmentation and compression maneuvers.  FINDINGS: Proper phasic flow was noted in the visualized bilateral lower extremity deep venous system. No intraluminal increased echogenicity is noted to suggest thrombus. There is proper compression and augmentation of the venous structures. No abnormal venous collaterals are seen. Subcutaneous edema noted in both lower extremities.      Impression: No evidence of left or right lower extremity deep venous thrombosis.      This report was signed and finalized on 4/10/2025 2:29 PM by Nelda Argueta MD.      XR Chest 1 View  Result Date: 4/10/2025  PROCEDURE: XR CHEST 1 VW-  HISTORY: eval edema, shortness of breath.  COMPARISON: May 10, 2024..  FINDINGS: The heart is upper limits of normal, stable.. There is  bilateral interstitial disease more prominent in the lung bases, stable from prior.. The mediastinum is unremarkable. There is no pneumothorax. There are no acute osseous abnormalities. Apical lordotic positioning noted.      Impression: Stable chest..     This report was signed and finalized on 4/10/2025 1:16 PM by Nelda Argueta MD.      I have reviewed the patient's radiology reports.    Medication Review:     I have reviewed the patient's active and prn medications.     Problem List:      Weakness    Lower extremity cellulitis    Obesity, Class III, BMI 40-49.9 (morbid obesity)    Assessment:    Bilateral lower extremity cellulitis, POA.  Chronic bilateral lower venous insufficiency and lymphedema.  History of atrial flutter status post ablation on rivaroxaban.  Morbid obesity with a BMI of 47.  Acquired hypothyroidism.  COPD, no exacerbation.  Impaired mobility and ADLs.  No evidence of CHF.    Plan:    Bilateral lower extremity cellulitis/lymphedema.  - Continue ceftriaxone and doxycycline.  - Wound care services have been consulted.  - Obtain nasal swab for MRSA screen.  - Keep extremities elevated.  - Bilateral lower extremity venous duplex was negative for DVT.    History of atrial flutter status post ablation.  - Continue rivaroxaban, metoprolol.    COPD/hypothyroidism.  - Continue home medications.    Discussed with the patient's grandmaureen Boland who is at the bedside.  Discussed with nursing staff and multidisciplinary team.    I have reviewed the copied text and it is accurate as of 04/11/25    DVT Prophylaxis: Rivaroxaban  Code Status: Full  Diet: Cardiac diabetic  Discharge Plan: Awaiting short-term rehab placement.    John Wills MD  04/11/25  14:35 EDT    Dictated utilizing Dragon dictation.

## 2025-04-11 NOTE — PLAN OF CARE
Goal Outcome Evaluation:  Plan of Care Reviewed With: patient        Progress: no change  Outcome Evaluation: Pt seen for OT evaluation today.  Pt lives at home with her granddaughter and her family.  She reports she is normally able to take care of herself.  She sleeps on the couch and sponge bathes, but reports is has been getting more difficult.   Pt required mod assist to sit eob, min assist to stand and min assist to transfer to the chair ~3'.  Pt reported increased pain with activity in both legs, but expecially her R leg.  Pt is mod/max assist for bathing, dressing and toileting.  Pt is expected to benefit from skilled OT to improve her strength, activity tolerance and independence with ADL tasks.  Pt would benefit from STR d/t pain, weakness and decreased ability to appropriately care for herself.    Anticipated Discharge Disposition (OT): sub acute care setting

## 2025-04-11 NOTE — PLAN OF CARE
Problem: Adult Inpatient Plan of Care  Goal: Plan of Care Review  Outcome: Progressing  Goal: Patient-Specific Goal (Individualized)  Outcome: Progressing  Goal: Absence of Hospital-Acquired Illness or Injury  Outcome: Progressing  Intervention: Identify and Manage Fall Risk  Recent Flowsheet Documentation  Taken 4/11/2025 0400 by Arlene Schmidt RN  Safety Promotion/Fall Prevention:   nonskid shoes/slippers when out of bed   safety round/check completed  Taken 4/11/2025 0200 by Arlene Schmidt RN  Safety Promotion/Fall Prevention:   nonskid shoes/slippers when out of bed   safety round/check completed  Taken 4/11/2025 0000 by Arlene Schmidt RN  Safety Promotion/Fall Prevention:   nonskid shoes/slippers when out of bed   safety round/check completed  Taken 4/10/2025 2200 by Arlene Schmidt RN  Safety Promotion/Fall Prevention:   nonskid shoes/slippers when out of bed   safety round/check completed  Taken 4/10/2025 2000 by Arlene Schmidt RN  Safety Promotion/Fall Prevention:   nonskid shoes/slippers when out of bed   safety round/check completed  Intervention: Prevent Skin Injury  Recent Flowsheet Documentation  Taken 4/11/2025 0400 by Arlene Schmidt RN  Body Position: sitting up in bed  Taken 4/11/2025 0200 by Arlene Schmidt RN  Body Position: sitting up in bed  Taken 4/11/2025 0000 by Arlene Schmidt RN  Body Position: sitting up in bed  Taken 4/10/2025 2200 by Arlene Schmidt RN  Body Position: position changed independently  Taken 4/10/2025 2000 by Arlene Schmidt RN  Body Position: sitting up in bed  Intervention: Prevent Infection  Recent Flowsheet Documentation  Taken 4/11/2025 0400 by Arlene Schmidt RN  Infection Prevention: environmental surveillance performed  Taken 4/11/2025 0200 by Arlene Schmidt RN  Infection Prevention: environmental surveillance performed  Taken 4/11/2025 0000 by Arlene Schmidt RN  Infection Prevention: environmental surveillance performed  Taken 4/10/2025 2200 by  Arlene Schmidt RN  Infection Prevention: environmental surveillance performed  Taken 4/10/2025 2000 by Arlene Schmidt RN  Infection Prevention: environmental surveillance performed  Goal: Optimal Comfort and Wellbeing  Outcome: Progressing  Goal: Readiness for Transition of Care  Outcome: Progressing  Intervention: Mutually Develop Transition Plan  Recent Flowsheet Documentation  Taken 4/10/2025 1950 by Arlene Schmidt RN  Equipment Currently Used at Home:   oxygen   rollator   bath bench     Problem: Skin Injury Risk Increased  Goal: Skin Health and Integrity  Outcome: Progressing  Intervention: Optimize Skin Protection  Recent Flowsheet Documentation  Taken 4/11/2025 0400 by Arlene Schmidt RN  Activity Management: activity encouraged  Head of Bed (HOB) Positioning: HOB elevated  Taken 4/11/2025 0200 by Arlene Schmidt RN  Activity Management: activity encouraged  Head of Bed (HOB) Positioning: HOB elevated  Taken 4/11/2025 0000 by Arlene Schmidt RN  Activity Management: activity encouraged  Head of Bed (HOB) Positioning: HOB elevated  Taken 4/10/2025 2200 by Arlene Schmidt RN  Activity Management: activity encouraged  Head of Bed (HOB) Positioning: HOB elevated  Taken 4/10/2025 2000 by Arlene Schmidt RN  Activity Management: activity encouraged  Head of Bed (HOB) Positioning: HOB elevated     Problem: Comorbidity Management  Goal: Maintenance of COPD Symptom Control  Outcome: Progressing  Goal: Maintenance of Heart Failure Symptom Control  Outcome: Progressing   Goal Outcome Evaluation:

## 2025-04-11 NOTE — DISCHARGE PLACEMENT REQUEST
"Patsy Frazier (64 y.o. Female)       Date of Birth   1960    Social Security Number       Address   [address] [place] [address]    Home Phone   [phone]    MRN   [id number]       [facility]    Marital Status                               Admission Date   4/10/2025    Admission Type   Emergency    Admitting Provider   John Wills MD    Attending Provider   John Wills MD    Department, Room/Bed   [facility] OB GYN, W201/1       Discharge Date       Discharge Disposition       Discharge Destination                                 Attending Provider: John Wills MD    Allergies: No Known Allergies    Isolation: None   Infection: None   Code Status: CPR    Ht: 172.7 cm (68\")   Wt: 140 kg (309 lb 11.9 oz)    Admission Cmt: None   Principal Problem: Weakness [R53.1]                   Active Insurance as of 4/10/2025       Primary Coverage       Payor Plan Insurance Group Employer/Plan Group    UNITED HEALTHCARE MEDICARE REPLACEMENT UHC MEDICARE ADVANTAGE [facility] HMO NON PAR [id number]       Payor Plan Address Payor Plan Phone Number Payor Plan Fax Number Effective Dates    [address] [address]   1/1/2024 - None Entered    [facility] [id number]         Subscriber Name Subscriber Birth Date Member ID       PATSY FRAZIER 1960 [id number]               Secondary Coverage       Payor Plan Insurance Group Employer/Plan Group    [employer] [place] MEDICAID        Payor Plan Address Payor Plan Phone Number Payor Plan Fax Number Effective Dates    [address] [address] [phone]  9/28/2017 - None Entered    [facility] [id number]         Subscriber Name Subscriber Birth Date Member ID       PATSY FRAZIER 1960 [id number]                     Emergency Contacts        (Rel.) Home Phone Work Phone Mobile Phone    Fani Alegria (Daughter) [phone] -- --    DAVID ALEGRIA (Son) [phone] -- --                 History & Physical        Jimbo Love MD at " 04/10/25 1949            [facility]   HISTORY AND PHYSICAL      Name:  Patsy Frazier   Age:  64 y.o.  Sex:  female  :  1960  MRN:  [id number]   Visit Number:  [id number]  Admission Date:  4/10/2025  Date Of Service:  04/10/25  Primary Care Physician:  Eva Washburn APRN    Chief Complaint:     Lower extremity edema, weakness    History Of Presenting Illness:      64-year-old female, history of congestive heart failure, COPD, stroke, SVT, presented for evaluation of bilateral lower extremity pain and swelling.  Has been ongoing for multiple months patient reports that the pain and swelling has increased over the last couple of days more than it had been, decreased mobility because of this, she reports that she has been using pads wrapped around her leg to help with the weeping edema.      She denies any traumatic injuries.  Appears to be anticoagulated with Xarelto.  Dopplerable and palpable pedal pulses  in ED.     Denies chest pain, denies shortness of breath.  Intermittent oxygen use at 3 L as needed, denies fevers.  Reports that this all started when she had wounds that were previously healed but accidentally nicked one of her legs and created an abrasion . Hx of leg edema since age 18. No other active complains     Review Of Systems:    All systems were reviewed and negative except as mentioned in history of presenting illness, assessment and plan.    Past Medical History: Patient  has a past medical history of Arthritis, Asthma, CHF (congestive heart failure), COPD (chronic obstructive pulmonary disease), Hypothyroidism, Stroke (), SVT (supraventricular tachycardia), and Wears glasses.    Past Surgical History: Patient  has a past surgical history that includes Tubal ligation (); Neck surgery (); Cholecystectomy (); and Cardiac electrophysiology procedure (N/A, 11/3/2017).    Social History: Patient  reports that she has been smoking cigarettes.  She has a 20 pack-year smoking history. She has never used smokeless tobacco. She reports that she does not drink alcohol and does not use drugs.    Family History:  per patient family history of bilat lower extrem edema     Allergies:      Patient has no known allergies.    Home Medications:    Prior to Admission Medications       Prescriptions Last Dose Informant Patient Reported? Taking?    albuterol sulfate  (90 Base) MCG/ACT inhaler   No No    Inhale 2 puffs Every 4 (Four) Hours As Needed for Wheezing.    cetirizine (zyrTEC) 10 MG tablet   Yes No    Take 1 tablet by mouth Daily.    Patient not taking:  Reported on 5/13/2024    fluticasone (FLONASE) 50 MCG/ACT nasal spray   Yes No    2 sprays into the nostril(s) as directed by provider Daily.    Patient not taking:  Reported on 5/13/2024    furosemide (LASIX) 80 MG tablet   No No    Take 1 tablet by mouth Daily.    levothyroxine (SYNTHROID, LEVOTHROID) 200 MCG tablet  Self Yes No    Take 1 tablet by mouth Every Morning.    levothyroxine (SYNTHROID, LEVOTHROID) 88 MCG tablet   Yes No    Take 1 tablet by mouth Daily. Take with the 200mg tablet for a total of 288mg per day    metoprolol tartrate (LOPRESSOR) 50 MG tablet   Yes No    Take 1 tablet by mouth 2 (Two) Times a Day.    Multiple Vitamins-Minerals (MULTIVITAMIN ADULT PO)  Self Yes No    Take 1 tablet by mouth Daily.    potassium chloride (KLOR-CON M20) 20 MEQ CR tablet   No No    Take 1 tablet by mouth Daily.    potassium chloride (MICRO-K) 10 MEQ CR capsule   Yes Yes    Take 1 capsule by mouth 2 (Two) Times a Day With Meals.    rivaroxaban (XARELTO) 20 MG tablet   No No    Take 1 tablet by mouth Daily With Dinner.    Umeclidinium-Vilanterol (Anoro Ellipta) 62.5-25 MCG/ACT aerosol powder  inhaler   No No    Inhale 1 puff Daily for 30 days.          ED Medications:    Medications   sodium chloride 0.9 % flush 10 mL (has no administration in time range)   sodium chloride 0.9 % flush 10 mL (has no  "administration in time range)   sodium chloride 0.9 % flush 10 mL (has no administration in time range)   sodium chloride 0.9 % infusion 40 mL (has no administration in time range)   ondansetron (ZOFRAN) injection 4 mg (has no administration in time range)   Pharmacy to Dose enoxaparin (LOVENOX) (has no administration in time range)   Potassium Replacement - Follow Nurse / BPA Driven Protocol (has no administration in time range)   Magnesium Standard Dose Replacement - Follow Nurse / BPA Driven Protocol (has no administration in time range)   Phosphorus Replacement - Follow Nurse / BPA Driven Protocol (has no administration in time range)   Calcium Replacement - Follow Nurse / BPA Driven Protocol (has no administration in time range)   enoxaparin sodium (LOVENOX) syringe 40 mg (40 mg Subcutaneous Given 4/10/25 1836)   morphine injection 4 mg (4 mg Intravenous Given 4/10/25 1235)   cephalexin (KEFLEX) capsule 500 mg (500 mg Oral Given 4/10/25 1509)   doxycycline (MONODOX) capsule 100 mg (100 mg Oral Given 4/10/25 1509)     Vital Signs:  Temp:  [98 °F (36.7 °C)] 98 °F (36.7 °C)  Heart Rate:  [] 120  Resp:  [20] 20  BP: (109-120)/(56-85) 120/58        04/10/25  1202   Weight: 136 kg (300 lb)     Body mass index is 45.61 kg/m².    Physical Exam:     Most recent vital Signs: /58   Pulse 120   Temp 98 °F (36.7 °C) (Axillary)   Resp 20   Ht 172.7 cm (68\")   Wt 136 kg (300 lb)   SpO2 91%   BMI 45.61 kg/m²     Physical Exam  General: NAD, resting comfortably in bed, obese, NC   HEENT: Normocephalic, atraumatic, PERRL. neck supple,   Cardiovascular: regularly irregular rhythm (PVCs). No murmurs, rubs, or gallops. Peripheral pulses 2+ bilaterally. No pedal edema,   Pulmonary: Lungs CTAB on ant fields No accessory muscle use, no resp. distress.   Abdomen: Soft, non-tender, non-distended. No guarding or rebound.   Extremities: No cyanosis, clubbing or edema. Warm and well-perfused. Capillary refill < 2 s . " "Extensive LE edema and erythema with scaling bilaterally, obese LE   Neurological: Alert and oriented to person, place, and time. No focal deficits. Cranial nerves II-XII grossly intact. Motor strength 5/5 in all extremities. Sensation grossly intact to light touch.   Psychiatric: Calm, cooperative. Mood and affect appropriate for the situation.    Laboratory data:    I have reviewed the labs done in the emergency room.    Results from last 7 days   Lab Units 04/10/25  1842 04/10/25  1217   SODIUM mmol/L 140 141   POTASSIUM mmol/L 4.7 4.4   CHLORIDE mmol/L 99 99   CO2 mmol/L 33.0* 33.7*   BUN mg/dL 26* 26*   CREATININE mg/dL 0.65 0.63   CALCIUM mg/dL 9.3 9.4   BILIRUBIN mg/dL  --  0.2   ALK PHOS U/L  --  68   ALT (SGPT) U/L  --  11   AST (SGOT) U/L  --  12   GLUCOSE mg/dL 112* 137*     Results from last 7 days   Lab Units 04/10/25  1842 04/10/25  1217   WBC 10*3/mm3 8.41 7.67   HEMOGLOBIN g/dL 11.7* 11.4*   HEMATOCRIT % 37.3 37.2   PLATELETS 10*3/mm3 295 311         Results from last 7 days   Lab Units 04/10/25  1318 04/10/25  1217   HSTROP T ng/L 8 7     Results from last 7 days   Lab Units 04/10/25  1217   PROBNP pg/mL 163.0                       Invalid input(s): \"USDES\", \"NITRITITE\", \"BACT\", \"EP\"    Pain Management Panel           No data to display                EKG:      NSR with PVCs     Radiology:    US Venous Doppler Lower Extremity Bilateral (duplex)  Result Date: 4/10/2025  BILATERAL LOWER EXTREMITY VENOUS DUPLEX DOPPLER EXAMINATION  HISTORY: Bilateral lower extremity swelling and pain, eval DVT  COMPARISON:   None  PROCEDURE: Multiple transverse and longitudinal scans were performed of both femoropopliteal deep venous system, with augmentation and compression maneuvers.  FINDINGS: Proper phasic flow was noted in the visualized bilateral lower extremity deep venous system. No intraluminal increased echogenicity is noted to suggest thrombus. There is proper compression and augmentation of the venous " structures. No abnormal venous collaterals are seen. Subcutaneous edema noted in both lower extremities.      No evidence of left or right lower extremity deep venous thrombosis.      This report was signed and finalized on 4/10/2025 2:29 PM by Nelda Argueta MD.      XR Chest 1 View  Result Date: 4/10/2025  PROCEDURE: XR CHEST 1 VW-  HISTORY: eval edema, shortness of breath.  COMPARISON: May 10, 2024..  FINDINGS: The heart is upper limits of normal, stable.. There is bilateral interstitial disease more prominent in the lung bases, stable from prior.. The mediastinum is unremarkable. There is no pneumothorax. There are no acute osseous abnormalities. Apical lordotic positioning noted.      Stable chest..     This report was signed and finalized on 4/10/2025 1:16 PM by Nelda Argueta MD.        Assessment:    64-year-old female, history of congestive heart failure, bilateral lower extremity edema chronic since age 18 (family history reported), COPD on O2, stroke, SVT, atrial flutter s/p ablation presented for evaluation of bilateral lower extremity pain and swelling ongoing for multiple months    Bilateral lower extremity lymphangitis  ?  Bilateral lower extremity superimposed cellulitis, mild  -No fevers no leukocytosis low procalcitonin  -Ultrasound  Doppler negative for DVT  Lower extremity swelling since 18 years old unclear etiology, positive family history  Obesity  PVCs  Weakness/deconditioning  History of heart failure        Plan:  -Initially patient was going to be discharged from ED however once EMS arrived patient refused to be discharged because she describes he felt too weak.  Patient's insurance will not take her for home health per chart review.  Initially instructed ED that there was no medical need for admission at the time that we discussed the case however patient was unable to ambulate and discharge was deemed unsafe.  Admitted for placement to rehab.  -Ceftriaxone doxycycline  - Telemetry  -  Continue home medications as appropriate  - PT OT case management consult    Risk Assessment: Moderate  DVT Prophylaxis: Lovenox  Code Status: Full          Jimbo Love MD  04/10/25  19:49 EDT    Dictated utilizing Dragon dictation.      Electronically signed by Jimbo Love MD at 04/10/25 2002       Physician Progress Notes (last 48 hours)  Notes from 25 1430 through 25 1430   No notes of this type exist for this encounter.          Physical Therapy Notes (last 48 hours)        Fabio Denton, PT at 25 133  Version 1 of          Goal Outcome Evaluation:  Plan of Care Reviewed With: patient        Progress: no change  Outcome Evaluation: pt participated in PT evaluation this date. Pt lives with her granddaughter and family. Pt reports that she is previously IND with all ADLs and mobility. Pt required mod A for bed mobility and sat EOB SBA. pt completed STS min A using RW and was able to tf to bedside chair min A. pt reports that she is unable to walk further at this point. Pt reports increased pain in B LEs, especially R LE. pt is expected to benefit from skilled PT during this inpatient stay to maximize functional mobility and IND. pt would further benefit from STR upon dc.    Anticipated Discharge Disposition (PT): inpatient rehabilitation facility                          Electronically signed by Fabio Denton PT at 25       Fabio Denton, PT at 25  Version 1 of          Patient Name: Patsy Frazier  : 1960    MRN: [id number]                              Today's Date: 2025       Admit Date: 4/10/2025    Visit Dx:     ICD-10-CM ICD-9-CM   1. Peripheral edema  R60.0 782.3   2. Wound of left lower extremity, initial encounter  S81.802A 894.0   3. Wound of right lower extremity, initial encounter  S81.801A 894.0   4. Bilateral cellulitis of lower leg  L03.116 682.6    L03.115      Patient Active Problem List   Diagnosis    Atrial flutter with rapid  "ventricular response    Tobacco abuse    SVT (supraventricular tachycardia)    Atrial flutter    Acute hypoxic respiratory failure    Hypoxia    COPD (chronic obstructive pulmonary disease)    Bilateral lower extremity edema    Weakness     Past Medical History:   Diagnosis Date    Arthritis     Asthma     CHF (congestive heart failure)     COPD (chronic obstructive pulmonary disease)     Hypothyroidism     Stroke 1976    \"mini stroke\" at the age of 16, no deficits per pt report     SVT (supraventricular tachycardia)     prior hx of     Wears glasses      Past Surgical History:   Procedure Laterality Date    CARDIAC ELECTROPHYSIOLOGY PROCEDURE N/A 11/3/2017    Procedure: Ablation atrial flutter vs SVT;  Surgeon: Chavo Carr MD;  Location: [facility] INVASIVE LOCATION;  Service:     CHOLECYSTECTOMY  2010    NECK SURGERY  2007    TUBAL ABDOMINAL LIGATION  1981      General Information       [facility] Name 04/11/25 1322          Physical Therapy Time and Intention    Document Type evaluation  -     Mode of Treatment physical therapy  -       Row Name 04/11/25 1322          General Information    Patient Profile Reviewed yes  -     Prior Level of Function independent:;ADL's;all household mobility  -     Existing Precautions/Restrictions fall  -     Barriers to Rehab medically complex;previous functional deficit  -       Row Name 04/11/25 1322          Living Environment    Current Living Arrangements home  -     People in Home grandchild(pearl)  -       Row Name 04/11/25 1322          Home Main Entrance    Number of Stairs, Main Entrance none  -       Row Name 04/11/25 1322          Stairs Within Home, Primary    Stairs, Within Home, Primary stairs to second level, pt sleeps on the couch on the first floor  -     Number of Stairs, Within Home, Primary twelve  -     Stair Railings, Within Home, Primary railing on left side (ascending)  -       Row Name 04/11/25 1322          Cognition    Orientation " Status (Cognition) oriented x 4  -       Row Name 04/11/25 1322          Safety Issues/Impairments Affecting Functional Mobility    Safety Issues Affecting Function (Mobility) safety precautions follow-through/compliance;safety precaution awareness  -     Impairments Affecting Function (Mobility) balance;endurance/activity tolerance;pain;strength  -               User Key  (r) = Recorded By, (t) = Taken By, (c) = Cosigned By      Initials Name Provider Type     Fabio Denton PT Physical Therapist                   Mobility       Row Name 04/11/25 1323          Bed Mobility    Bed Mobility bed mobility (all) activities  -     All Activities, [place] (Bed Mobility) moderate assist (50% patient effort)  -     Assistive Device (Bed Mobility) head of bed elevated;bed rails  -       Row Name 04/11/25 1323          Bed-Chair Transfer    Bed-Chair [place] (Transfers) minimum assist (75% patient effort)  -     Assistive Device (Bed-Chair Transfers) other (see comments)  -     Comment, (Bed-Chair Transfer) HHA and gait belt  -       Row Name 04/11/25 1323          Sit-Stand Transfer    Sit-Stand [place] (Transfers) minimum assist (75% patient effort)  -     Assistive Device (Sit-Stand Transfers) walker, front-wheeled  -       Row Name 04/11/25 1323          Gait/Stairs (Locomotion)    Patient was able to Ambulate yes  -     Distance in Feet (Gait) 3  -MK     Bilateral Gait Deviations forward flexed posture;weight shift ability decreased  -               User Key  (r) = Recorded By, (t) = Taken By, (c) = Cosigned By      Initials Name Provider Type    Fabio Donovan PT Physical Therapist                   Obj/Interventions       Row Name 04/11/25 1324          Range of Motion Comprehensive    General Range of Motion no range of motion deficits identified  -[facility] Name 04/11/25 1324          Strength Comprehensive (MMT)    General Manual Muscle Testing (MMT) Assessment lower  extremity strength deficits identified  -     Comment, General Manual Muscle Testing (MMT) Assessment B LE 4/5  -       Row Name 04/11/25 1324          Balance    Balance Assessment sitting static balance;sitting dynamic balance;standing static balance;standing dynamic balance  -     Static Sitting Balance standby assist  -     Dynamic Sitting Balance moderate assist  -MK     Position, Sitting Balance unsupported;sitting edge of bed  -     Static Standing Balance minimal assist  -     Dynamic Standing Balance minimal assist  -     Position/Device Used, Standing Balance walker, front-wheeled  -     Balance Interventions sitting;standing;sit to stand  -               User Key  (r) = Recorded By, (t) = Taken By, (c) = Cosigned By      Initials Name Provider Type     Fabio Denton, PT Physical Therapist                   Goals/Plan       Row Name 04/11/25 1328          Bed Mobility Goal 1 (PT)    Activity/Assistive Device (Bed Mobility Goal 1, PT) bed mobility activities, all  -     [place] Level/Cues Needed (Bed Mobility Goal 1, PT) minimum assist (75% or more patient effort)  -     Time Frame (Bed Mobility Goal 1, PT) short term goal (STG);5 days  -     Progress/Outcomes (Bed Mobility Goal 1, PT) new goal  -       Row Name 04/11/25 1328          Transfer Goal 1 (PT)    Activity/Assistive Device (Transfer Goal 1, PT) sit-to-stand/stand-to-sit  -     [place] Level/Cues Needed (Transfer Goal 1, PT) minimum assist (75% or more patient effort)  -     Time Frame (Transfer Goal 1, PT) long term goal (LTG);10 days  -     Progress/Outcome (Transfer Goal 1, PT) new goal  -       Row Name 04/11/25 1328          Gait Training Goal 1 (PT)    Activity/Assistive Device (Gait Training Goal 1, PT) gait (walking locomotion)  -     [place] Level (Gait Training Goal 1, PT) minimum assist (75% or more patient effort)  -     Distance (Gait Training Goal 1, PT) 25 ft  -     Time Frame  (Gait Training Goal 1, PT) long term goal (LTG);10 days  -MK     Progress/Outcome (Gait Training Goal 1, PT) new goal  -       Row Name 04/11/25 1328          Patient Education Goal (PT)    Activity (Patient Education Goal, PT) Pt to participate in B LE ther ex at least 3x per week  -     [place]/Cues/Accuracy (Memory Goal 2, PT) demonstrates adequately  -MK     Time Frame (Patient Education Goal, PT) long term goal (LTG);10 days  -MK     Progress/Outcome (Patient Education Goal, PT) new goal  -       Row Name 04/11/25 1328          Therapy Assessment/Plan (PT)    Planned Therapy Interventions (PT) balance training;bed mobility training;gait training;home exercise program;motor coordination training;neuromuscular re-education;patient/family education;strengthening;transfer training  -               User Key  (r) = Recorded By, (t) = Taken By, (c) = Cosigned By      Initials Name Provider Type    Fabio Donovan, PT Physical Therapist                   Clinical Impression       Row Name 04/11/25 1324          Pain    Pretreatment Pain Rating 0/10 - no pain  -     Posttreatment Pain Rating 5/10  -     Pain Location extremity  -     Pain Side/Orientation bilateral;lower  -MK     Pain Management Interventions exercise or physical activity utilized;activity modification encouraged  -     Response to Pain Interventions activity participation with increased pain  -       Row Name 04/11/25 1320          Plan of Care Review    Plan of Care Reviewed With patient  -MK     Progress no change  -     Outcome Evaluation pt participated in PT evaluation this date. Pt lives with her granddaughter and family. Pt reports that she is previously IND with all ADLs and mobility. Pt required mod A for bed mobility and sat EOB SBA. pt completed STS min A using RW and was able to tf to bedside chair min A. pt reports that she is unable to walk further at this point. Pt reports increased pain in B LEs, especially R  LE. pt is expected to benefit from skilled PT during this inpatient stay to maximize functional mobility and IND. pt would further benefit from STR upon dc.  -       Row Name 04/11/25 1325          Therapy Assessment/Plan (PT)    Patient/Family Therapy Goals Statement (PT) pt would like to go to STR  -     Rehab Potential (PT) good  -     Criteria for Skilled Interventions Met (PT) yes;skilled treatment is necessary  -     Therapy Frequency (PT) 5 times/wk  -     Predicted Duration of Therapy Intervention (PT) 2 weeks  -       Row Name 04/11/25 1325          Vital Signs    O2 Delivery Pre Treatment supplemental O2  -     O2 Delivery Intra Treatment supplemental O2  -     O2 Delivery Post Treatment supplemental O2  -MK     Pre Patient Position Supine  -     Intra Patient Position Standing  -     Post Patient Position Sitting  -       Row Name 04/11/25 1325          Positioning and Restraints    Pre-Treatment Position in bed  -MK     Post Treatment Position chair  -     In Chair sitting;call light within reach;encouraged to call for assist  -               User Key  (r) = Recorded By, (t) = Taken By, (c) = Cosigned By      Initials Name Provider Type    Fabio Donovan, PT Physical Therapist                   Outcome Measures       Row Name 04/11/25 1330 04/11/25 0800       How much help from another person do you currently need...    Turning from your back to your side while in flat bed without using bedrails? 4  - 4  -KJ    Moving from lying on back to sitting on the side of a flat bed without bedrails? 3  - 3  -KJ    Moving to and from a bed to a chair (including a wheelchair)? 3  - 3  -KJ    Standing up from a chair using your arms (e.g., wheelchair, bedside chair)? 2  - 2  -KJ    Climbing 3-5 steps with a railing? 1  - 1  -KJ    To walk in hospital room? 2  - 2  -KJ    AM-PAC 6 Clicks Score (PT) 15  - 15  -KJ    Highest Level of Mobility Goal 4 --> Transfer to  chair/commode  - 4 --> Transfer to chair/commode  -ZAKIA      Row Name 04/11/25 1330 04/11/25 1255       Functional Assessment    Outcome Measure Options AM-PAC 6 Clicks Basic Mobility (PT)  - AM-PAC 6 Clicks Daily Activity (OT)  -              User Key  (r) = Recorded By, (t) = Taken By, (c) = Cosigned By      Initials Name Provider Type     Jazmin Vergara Occupational Therapist    Fabio Donovan, PT Physical Therapist    Carmenza Blakely, RN Registered Nurse                                 Physical Therapy Education       Title: PT OT SLP Therapies (In Progress)       Topic: Physical Therapy (In Progress)       Point: Mobility training (Done)       Learning Progress Summary            Patient Acceptance, E,D, DU,VU by  at 4/11/2025 1331    Comment: educated on purpose of PT and importance of mobility                      Point: Home exercise program (Not Started)       Learner Progress:  Not documented in this visit.              Point: Body mechanics (Done)       Learning Progress Summary            Patient Acceptance, E,D, DU,VU by  at 4/11/2025 1331    Comment: educated on purpose of PT and importance of mobility                      Point: Precautions (Not Started)       Learner Progress:  Not documented in this visit.                              User Key       Initials Effective Dates Name Provider Type Discipline     06/13/23 -  Fabio Denton, PT Physical Therapist PT                  PT Recommendation and Plan  Planned Therapy Interventions (PT): balance training, bed mobility training, gait training, home exercise program, motor coordination training, neuromuscular re-education, patient/family education, strengthening, transfer training  Progress: no change  Outcome Evaluation: pt participated in PT evaluation this date. Pt lives with her granddaughter and family. Pt reports that she is previously IND with all ADLs and mobility. Pt required mod A for bed mobility and sat EOB SBA. pt completed  STS min A using RW and was able to tf to bedside chair min A. pt reports that she is unable to walk further at this point. Pt reports increased pain in B LEs, especially R LE. pt is expected to benefit from skilled PT during this inpatient stay to maximize functional mobility and IND. pt would further benefit from STR upon dc.     Time Calculation:   PT Evaluation Complexity  History, PT Evaluation Complexity: 1-2 personal factors and/or comorbidities  Examination of Body Systems (PT Eval Complexity): total of 3 or more elements  Clinical Presentation (PT Evaluation Complexity): evolving  Clinical Decision Making (PT Evaluation Complexity): moderate complexity  Overall Complexity (PT Evaluation Complexity): moderate complexity     PT Charges       Row Name 25 1114             Time Calculation    Start Time 1114  -MK      PT Received On 25  -MK      PT Goal Re-Cert Due Date 25  -MK         Untimed Charges    PT Eval/Re-eval Minutes 55  -MK         Total Minutes    Untimed Charges Total Minutes 55  -MK       Total Minutes 55  -MK                User Key  (r) = Recorded By, (t) = Taken By, (c) = Cosigned By      Initials Name Provider Type    Fabio Donovan, PT Physical Therapist                  Therapy Charges for Today       Code Description Service Date Service Provider Modifiers Qty    92024800221 HC PT EVAL MOD COMPLEXITY 4 2025 Fabio Denton, PT GP 1            PT G-Codes  Outcome Measure Options: AM-PAC 6 Clicks Basic Mobility (PT)  AM-PAC 6 Clicks Score (PT): 15  AM-PAC 6 Clicks Score (OT): 15  PT Discharge Summary  Anticipated Discharge Disposition (PT): inpatient rehabilitation facility    Fabio Denton PT  2025      Electronically signed by Fabio Denton PT at 25 1333          Occupational Therapy Notes (last 48 hours)        Jazmin Vergara at 25 1257          Patient Name: Patsy Frazier  : 1960    MRN: [id number]                              Today's  "Date: 4/11/2025       Admit Date: 4/10/2025    Visit Dx:     ICD-10-CM ICD-9-CM   1. Peripheral edema  R60.0 782.3   2. Wound of left lower extremity, initial encounter  S81.802A 894.0   3. Wound of right lower extremity, initial encounter  S81.801A 894.0   4. Bilateral cellulitis of lower leg  L03.116 682.6    L03.115      Patient Active Problem List   Diagnosis    Atrial flutter with rapid ventricular response    Tobacco abuse    SVT (supraventricular tachycardia)    Atrial flutter    Acute hypoxic respiratory failure    Hypoxia    COPD (chronic obstructive pulmonary disease)    Bilateral lower extremity edema    Weakness     Past Medical History:   Diagnosis Date    Arthritis     Asthma     CHF (congestive heart failure)     COPD (chronic obstructive pulmonary disease)     Hypothyroidism     Stroke 1976    \"mini stroke\" at the age of 16, no deficits per pt report     SVT (supraventricular tachycardia)     prior hx of     Wears glasses      Past Surgical History:   Procedure Laterality Date    CARDIAC ELECTROPHYSIOLOGY PROCEDURE N/A 11/3/2017    Procedure: Ablation atrial flutter vs SVT;  Surgeon: Chavo Carr MD;  Location: [facility] INVASIVE LOCATION;  Service:     CHOLECYSTECTOMY  2010    NECK SURGERY  2007    TUBAL ABDOMINAL LIGATION  1981      General Information       Row Name 04/11/25 1221          OT Time and Intention    Document Type evaluation  -     Mode of Treatment occupational therapy  -     Patient Effort adequate  -     Symptoms Noted During/After Treatment increased pain;nausea  -       Row Name 04/11/25 1221          General Information    Patient Profile Reviewed yes  -AH     Prior Level of Function independent:;ADL's;all household mobility  pt uses a rollator for household ambulation, she reports she is independent with dressing and toileting, but reports having difficulty with bathing tasks.  Pt performs sponge bath at home  -     Existing Precautions/Restrictions fall  -     " Barriers to Rehab medically complex;previous functional deficit  -[facility] Name 04/11/25 1221          Occupational Profile    Reason for Services/Referral (Occupational Profile) ADL decline  -[facility] Name 04/11/25 1221          Living Environment    Current Living Arrangements home  -     People in Home grandchild(pearl)  -[facility] Name 04/11/25 1221          Home Main Entrance    Number of Stairs, Main Entrance none  -[facility] Name 04/11/25 1221          Stairs Within Home, Primary    Stairs, Within Home, Primary to second level where full bathroom is located, pt reports she hasn't gone upstairs for a while, she sleeps on the couch  -     Number of Stairs, Within Home, Primary twelve  -     Stair Railings, Within Home, Primary railing on left side (ascending)  -[facility] Name 04/11/25 1221          Cognition    Orientation Status (Cognition) oriented x 4  -[facility] Name 04/11/25 1221          Safety Issues/Impairments Affecting Functional Mobility    Safety Issues Affecting Function (Mobility) safety precaution awareness;safety precautions follow-through/compliance  -     Impairments Affecting Function (Mobility) balance;endurance/activity tolerance;pain;strength  -               User Key  (r) = Recorded By, (t) = Taken By, (c) = Cosigned By      Initials Name Provider Type     Jazmin Vergara Occupational Therapist                     Mobility/ADL's       [facility] Name 04/11/25 1229          Bed Mobility    Bed Mobility bed mobility (all) activities  -     All Activities, [place] (Bed Mobility) moderate assist (50% patient effort)  -     Assistive Device (Bed Mobility) head of bed elevated;bed rails  -[facility] Name 04/11/25 1229          Transfers    Transfers sit-stand transfer;bed-chair transfer  -[facility] Name 04/11/25 1229          Bed-Chair Transfer    Bed-Chair [place] (Transfers) minimum assist (75% patient effort)  -     Assistive Device (Bed-Chair  Transfers) other (see comments)  -     Comment, (Bed-Chair Transfer) HHA and gait belt  -       Row Name 04/11/25 1229          Sit-Stand Transfer    Sit-Stand [place] (Transfers) minimum assist (75% patient effort)  -     Assistive Device (Sit-Stand Transfers) walker, front-wheeled  -       Row Name 04/11/25 1229          Functional Mobility    Functional Mobility- Ind. Level minimum assist (75% patient effort)  -     Functional Mobility-Distance (Feet) 3  -     Functional Mobility- Safety Issues supplemental O2  -     Patient was able to Ambulate yes  -[facility] Name 04/11/25 1229          Activities of Daily Living    BADL Assessment/Intervention bathing;upper body dressing;lower body dressing;grooming;feeding;toileting  -[facility] Name 04/11/25 1229          Bathing Assessment/Intervention    [place] Level (Bathing) moderate assist (50% patient effort)  -[facility] Name 04/11/25 1229          Upper Body Dressing Assessment/Training    [place] Level (Upper Body Dressing) set up  -AH       Row Name 04/11/25 1229          Lower Body Dressing Assessment/Training    [place] Level (Lower Body Dressing) maximum assist (25% patient effort)  -[facility] Name 04/11/25 1229          Grooming Assessment/Training    [place] Level (Grooming) set up  -[facility] Name 04/11/25 1229          Self-Feeding Assessment/Training    [place] Level (Feeding) independent  -[facility] Name 04/11/25 1229          Toileting Assessment/Training    [place] Level (Toileting) maximum assist (25% patient effort)  -               User Key  (r) = Recorded By, (t) = Taken By, (c) = Cosigned By      Initials Name Provider Type     Jazmin Vergara Occupational Therapist                   Obj/Interventions       [facility] Name 04/11/25 1230          Sensory Assessment (Somatosensory)    Sensory Assessment (Somatosensory) sensation intact  -[facility] Name 04/11/25 1230          Vision  Assessment/Intervention    Visual Impairment/Limitations [facility]  -AH       Row Name 04/11/25 1230          Range of Motion Comprehensive    General Range of Motion bilateral upper extremity ROM WFL  -AH       Row Name 04/11/25 1230          Strength Comprehensive (MMT)    Comment, General Manual Muscle Testing (MMT) Assessment [facility] 4-/5  -               User Key  (r) = Recorded By, (t) = Taken By, (c) = Cosigned By      Initials Name Provider Type    Jazmin Hager Occupational Therapist                   Goals/Plan       Row Name 04/11/25 1252          Bed Mobility Goal 1 (OT)    Activity/Assistive Device (Bed Mobility Goal 1, OT) bed mobility activities, all  -     [place] Level/Cues Needed (Bed Mobility Goal 1, OT) minimum assist (75% or more patient effort)  -     Time Frame (Bed Mobility Goal 1, OT) by discharge  -     Progress/Outcomes (Bed Mobility Goal 1, OT) goal ongoing  -AH       Row Name 04/11/25 1252          Transfer Goal 1 (OT)    Activity/Assistive Device (Transfer Goal 1, OT) sit-to-stand/stand-to-sit;walker, rolling  -     [place] Level/Cues Needed (Transfer Goal 1, OT) contact guard required  -     Time Frame (Transfer Goal 1, OT) by discharge  -     Progress/Outcome (Transfer Goal 1, OT) goal ongoing  -AH       Row Name 04/11/25 1252          Bathing Goal 1 (OT)    Activity/Device (Bathing Goal 1, OT) bathing skills, all  -     [place] Level/Cues Needed (Bathing Goal 1, OT) minimum assist (75% or more patient effort)  -     Time Frame (Bathing Goal 1, OT) by discharge  -     Progress/Outcomes (Bathing Goal 1, OT) goal ongoing  -AH       Row Name 04/11/25 1252          Dressing Goal 1 (OT)    Activity/Device (Dressing Goal 1, OT) lower body dressing  -     [place]/Cues Needed (Dressing Goal 1, OT) minimum assist (75% or more patient effort)  -     Time Frame (Dressing Goal 1, OT) long term goal (LTG);1 week  -     Progress/Outcome (Dressing Goal 1, OT)  goal ongoing  -[facility] Name 04/11/25 1252          Toileting Goal 1 (OT)    Activity/Device (Toileting Goal 1, OT) adjust/manage clothing;perform perineal hygiene  -     [place] Level/Cues Needed (Toileting Goal 1, OT) minimum assist (75% or more patient effort)  -     Time Frame (Toileting Goal 1, OT) by discharge  -     Progress/Outcome (Toileting Goal 1, OT) goal ongoing  -[facility] Name 04/11/25 1252          Strength Goal 1 (OT)    Strength Goal 1 (OT) Pt will perform UB strengthening ex using theraband for resistance.  -     Time Frame (Strength Goal 1, OT) by discharge  -     Progress/Outcome (Strength Goal 1, OT) goal ongoing  -[facility] Name 04/11/25 1252          Therapy Assessment/Plan (OT)    Planned Therapy Interventions (OT) activity tolerance training;BADL retraining;patient/caregiver education/training;transfer/mobility retraining;strengthening exercise  -               User Key  (r) = Recorded By, (t) = Taken By, (c) = Cosigned By      Initials Name Provider Type    Jazmin Hager Occupational Therapist                   Clinical Impression       [facility] Name 04/11/25 1230          Pain Assessment    Pretreatment Pain Rating 0/10 - no pain  -     Posttreatment Pain Rating 5/10  -     Pain Location extremity  -     Pain Side/Orientation bilateral;lower  -     Pain Management Interventions exercise or physical activity utilized  -     Response to Pain Interventions activity participation with increased pain  -     Pre/Posttreatment Pain Comment pt reports pain increasing to 10/10 with activity  -[facility] Name 04/11/25 1230          Plan of Care Review    Plan of Care Reviewed With patient  -     Progress no change  -     Outcome Evaluation Pt seen for OT evaluation today.  Pt lives at home with her granddaughter and her family.  She reports she is normally able to take care of herself.  She sleeps on the couch and sponge bathes, but reports is has been  getting more difficult.   Pt required mod assist to sit eob, min assist to stand and min assist to transfer to the chair ~3'.  Pt reported increased pain with activity in both legs, but expecially her R leg.  Pt is mod/max assist for bathing, dressing and toileting.  Pt is expected to benefit from skilled OT to improve her strength, activity tolerance and independence with ADL tasks.  Pt would benefit from STR d/t pain, weakness and decreased ability to appropriately care for herself.  -[facility] Name 04/11/25 1230          Therapy Assessment/Plan (OT)    Patient/Family Therapy Goal Statement (OT) d/c rehab  -     Rehab Potential (OT) good  -     Criteria for Skilled Therapeutic Interventions Met (OT) yes;skilled treatment is necessary  -     Therapy Frequency (OT) 3 times/wk  -[facility] Name 04/11/25 1230          Therapy Plan Review/Discharge Plan (OT)    Anticipated Discharge Disposition (OT) sub acute care setting  -[facility] Name 04/11/25 1230          Positioning and Restraints    Pre-Treatment Position in bed  -     Post Treatment Position chair  -     In Chair sitting;call light within reach;encouraged to call for assist  -               User Key  (r) = Recorded By, (t) = Taken By, (c) = Cosigned By      Initials Name Provider Type    Jazmin Hager Occupational Therapist                   Outcome Measures       Row Name 04/11/25 1255          How much help from another is currently needed...    Putting on and taking off regular lower body clothing? 2  -AH     Bathing (including washing, rinsing, and drying) 2  -AH     Toileting (which includes using toilet bed pan or urinal) 2  -AH     Putting on and taking off regular upper body clothing 3  -AH     Taking care of personal grooming (such as brushing teeth) 3  -AH     Eating meals 3  -AH     AM-PAC 6 Clicks Score (OT) 15  -[facility] Name 04/11/25 0800          How much help from another person do you currently need...    Turning  from your back to your side while in flat bed without using bedrails? 4  -KJ     Moving from lying on back to sitting on the side of a flat bed without bedrails? 3  -KJ     Moving to and from a bed to a chair (including a wheelchair)? 3  -KJ     Standing up from a chair using your arms (e.g., wheelchair, bedside chair)? 2  -KJ     Climbing 3-5 steps with a railing? 1  -KJ     To walk in hospital room? 2  -KJ     AM-PAC 6 Clicks Score (PT) 15  -KJ       Row Name 04/11/25 1255          Functional Assessment    Outcome Measure Options AM-PAC 6 Clicks Daily Activity (OT)  -               User Key  (r) = Recorded By, (t) = Taken By, (c) = Cosigned By      Initials Name Provider Type     Jazmin Vergara Occupational Therapist    Carmenza Blakely RN Registered Nurse                    Occupational Therapy Education       Title: PT OT SLP Therapies (In Progress)       Topic: Occupational Therapy (In Progress)       Point: ADL training (Done)       Learning Progress Summary            Patient Acceptance, E,TB, VU by  at 4/11/2025 1256    Comment: Role of OT/POC                                      User Key       Initials Effective Dates Name Provider Type Discipline     06/16/21 -  Jazmin Vergara Occupational Therapist OT                  OT Recommendation and Plan  Planned Therapy Interventions (OT): activity tolerance training, BADL retraining, patient/caregiver education/training, transfer/mobility retraining, strengthening exercise  Therapy Frequency (OT): 3 times/wk  Plan of Care Review  Plan of Care Reviewed With: patient  Progress: no change  Outcome Evaluation: Pt seen for OT evaluation today.  Pt lives at home with her granddaughter and her family.  She reports she is normally able to take care of herself.  She sleeps on the couch and sponge bathes, but reports is has been getting more difficult.   Pt required mod assist to sit eob, min assist to stand and min assist to transfer to the chair ~3'.  Pt  reported increased pain with activity in both legs, but expecially her R leg.  Pt is mod/max assist for bathing, dressing and toileting.  Pt is expected to benefit from skilled OT to improve her strength, activity tolerance and independence with ADL tasks.  Pt would benefit from STR d/t pain, weakness and decreased ability to appropriately care for herself.     Time Calculation:   Evaluation Complexity (OT)  Review Occupational Profile/Medical/Therapy History Complexity: expanded/moderate complexity  Assessment, Occupational Performance/Identification of Deficit Complexity: 3-5 performance deficits  Clinical Decision Making Complexity (OT): detailed assessment/moderate complexity  Overall Complexity of Evaluation (OT): moderate complexity     Time Calculation- OT       Row Name 04/11/25 1257             Time Calculation- OT    OT Start Time 1115  -AH      OT Received On 04/11/25  -      OT Goal Re-Cert Due Date 04/21/25  -         Untimed Charges    OT Eval/Re-eval Minutes 53  -AH         Total Minutes    Untimed Charges Total Minutes 53  -AH       Total Minutes 53  -AH                User Key  (r) = Recorded By, (t) = Taken By, (c) = Cosigned By      Initials Name Provider Type    Jazmin Hager Occupational Therapist                  Therapy Charges for Today       Code Description Service Date Service Provider Modifiers Qty    76738455364 HC OT EVAL MOD COMPLEXITY 4 4/11/2025 Jazmin Vergara GO 1                 Jazmin Vergara  4/11/2025    Electronically signed by Jazmin Vergara at 04/11/25 1257       Jazmin Vergara at 04/11/25 1256          Goal Outcome Evaluation:  Plan of Care Reviewed With: patient        Progress: no change  Outcome Evaluation: Pt seen for OT evaluation today.  Pt lives at home with her granddaughter and her family.  She reports she is normally able to take care of herself.  She sleeps on the couch and sponge bathes, but reports is has been getting more difficult.   Pt required mod  assist to sit eob, min assist to stand and min assist to transfer to the chair ~3'.  Pt reported increased pain with activity in both legs, but expecially her R leg.  Pt is mod/max assist for bathing, dressing and toileting.  Pt is expected to benefit from skilled OT to improve her strength, activity tolerance and independence with ADL tasks.  Pt would benefit from STR d/t pain, weakness and decreased ability to appropriately care for herself.    Anticipated Discharge Disposition (OT): sub acute care setting                          Electronically signed by Jazmin Vergara at 04/11/25 7468

## 2025-04-11 NOTE — PAYOR COMM NOTE
"To:  [facility]  From: Michelle Lucero RN  Phone: [phone]  Fax: [phone]  NPI: [id number]  TIN: [id number]  Member ID: [id number]   MRN: [id number]    OBSERVATION NOTIFICATION    Patsy Frazier (64 y.o. Female)       Date of Birth   1960    Social Security Number       Address   [address] [place] [id number]    Home Phone   [phone]    MRN   [id number]       Sabianism   [facility]    Marital Status                               Admission Date   4/10/2025    Admission Type   Emergency    Admitting Provider   Jimbo Love MD    Attending Provider   John Wills MD    Department, Room/Bed   [facility], W201/1       Discharge Date       Discharge Disposition       Discharge Destination                                 Attending Provider: John Wills MD    Allergies: No Known Allergies    Isolation: None   Infection: None   Code Status: CPR    Ht: 172.7 cm (68\")   Wt: 140 kg (309 lb 11.9 oz)    Admission Cmt: None   Principal Problem: Weakness [R53.1]                   Active Insurance as of 4/10/2025       Primary Coverage       Payor Plan Insurance Group Employer/Plan Group    UNITED HEALTHC[employer] MEDICARE REPLACEMENT UHC MEDICARE ADVANTAGE [facility] HMO NON PAR KYDSNP       Payor Plan Address Payor Plan Phone Number Payor Plan Fax Number Effective Dates    [address] [address]   1/1/2024 - None Entered    [facility] [id number]         Subscriber Name Subscriber Birth Date Member ID       PATSY FRAZIER 1960 [id number]               Secondary Coverage       Payor Plan Insurance Group Employer/Plan Group    [facility] MEDICAID        Payor Plan Address Payor Plan Phone Number Payor Plan Fax Number Effective Dates    [address] [address] [phone]  9/28/2017 - None Entered    [facility] [id number]         Subscriber Name Subscriber Birth Date Member ID       PATSY FRAZIER 1960 [id number]                     Emergency Contacts        (Rel.) Home " Phone Work Phone Mobile Phone    Fani Alegria (Daughter) [phone] -- --    DAVID ALEGRIA (Son) [phone] -- --                 History & Physical        Jimbo Love MD at 04/10/25 1949            [facility]   HISTORY AND PHYSICAL      Name:  Patsy Frazier   Age:  64 y.o.  Sex:  female  :  1960  MRN:  [id number]   Visit Number:  [id number]  Admission Date:  4/10/2025  Date Of Service:  04/10/25  Primary Care Physician:  Eva Washburn APRN    Chief Complaint:     Lower extremity edema, weakness    History Of Presenting Illness:      64-year-old female, history of congestive heart failure, COPD, stroke, SVT, presented for evaluation of bilateral lower extremity pain and swelling.  Has been ongoing for multiple months patient reports that the pain and swelling has increased over the last couple of days more than it had been, decreased mobility because of this, she reports that she has been using pads wrapped around her leg to help with the weeping edema.      She denies any traumatic injuries.  Appears to be anticoagulated with Xarelto.  Dopplerable and palpable pedal pulses  in ED.     Denies chest pain, denies shortness of breath.  Intermittent oxygen use at 3 L as needed, denies fevers.  Reports that this all started when she had wounds that were previously healed but accidentally nicked one of her legs and created an abrasion . Hx of leg edema since age 18. No other active complains     Review Of Systems:    All systems were reviewed and negative except as mentioned in history of presenting illness, assessment and plan.    Past Medical History: Patient  has a past medical history of Arthritis, Asthma, CHF (congestive heart failure), COPD (chronic obstructive pulmonary disease), Hypothyroidism, Stroke (), SVT (supraventricular tachycardia), and Wears glasses.    Past Surgical History: Patient  has a past surgical history that includes Tubal ligation ();  Neck surgery (2007); Cholecystectomy (2010); and Cardiac electrophysiology procedure (N/A, 11/3/2017).    Social History: Patient  reports that she has been smoking cigarettes. She has a 20 pack-year smoking history. She has never used smokeless tobacco. She reports that she does not drink alcohol and does not use drugs.    Family History:  per patient family history of bilat lower extrem edema     Allergies:      Patient has no known allergies.    Home Medications:    Prior to Admission Medications       Prescriptions Last Dose Informant Patient Reported? Taking?    albuterol sulfate  (90 Base) MCG/ACT inhaler   No No    Inhale 2 puffs Every 4 (Four) Hours As Needed for Wheezing.    cetirizine (zyrTEC) 10 MG tablet   Yes No    Take 1 tablet by mouth Daily.    Patient not taking:  Reported on 5/13/2024    fluticasone (FLONASE) 50 MCG/ACT nasal spray   Yes No    2 sprays into the nostril(s) as directed by provider Daily.    Patient not taking:  Reported on 5/13/2024    furosemide (LASIX) 80 MG tablet   No No    Take 1 tablet by mouth Daily.    levothyroxine (SYNTHROID, LEVOTHROID) 200 MCG tablet  Self Yes No    Take 1 tablet by mouth Every Morning.    levothyroxine (SYNTHROID, LEVOTHROID) 88 MCG tablet   Yes No    Take 1 tablet by mouth Daily. Take with the 200mg tablet for a total of 288mg per day    metoprolol tartrate (LOPRESSOR) 50 MG tablet   Yes No    Take 1 tablet by mouth 2 (Two) Times a Day.    Multiple Vitamins-Minerals (MULTIVITAMIN ADULT PO)  Self Yes No    Take 1 tablet by mouth Daily.    potassium chloride (KLOR-CON M20) 20 MEQ CR tablet   No No    Take 1 tablet by mouth Daily.    potassium chloride (MICRO-K) 10 MEQ CR capsule   Yes Yes    Take 1 capsule by mouth 2 (Two) Times a Day With Meals.    rivaroxaban (XARELTO) 20 MG tablet   No No    Take 1 tablet by mouth Daily With Dinner.    Umeclidinium-Vilanterol (Anoro Ellipta) 62.5-25 MCG/ACT aerosol powder  inhaler   No No    Inhale 1 puff Daily  "for 30 days.          ED Medications:    Medications   sodium chloride 0.9 % flush 10 mL (has no administration in time range)   sodium chloride 0.9 % flush 10 mL (has no administration in time range)   sodium chloride 0.9 % flush 10 mL (has no administration in time range)   sodium chloride 0.9 % infusion 40 mL (has no administration in time range)   ondansetron (ZOFRAN) injection 4 mg (has no administration in time range)   Pharmacy to Dose enoxaparin (LOVENOX) (has no administration in time range)   Potassium Replacement - Follow Nurse / BPA Driven Protocol (has no administration in time range)   Magnesium Standard Dose Replacement - Follow Nurse / BPA Driven Protocol (has no administration in time range)   Phosphorus Replacement - Follow Nurse / BPA Driven Protocol (has no administration in time range)   Calcium Replacement - Follow Nurse / BPA Driven Protocol (has no administration in time range)   enoxaparin sodium (LOVENOX) syringe 40 mg (40 mg Subcutaneous Given 4/10/25 1836)   morphine injection 4 mg (4 mg Intravenous Given 4/10/25 1235)   cephalexin (KEFLEX) capsule 500 mg (500 mg Oral Given 4/10/25 1509)   doxycycline (MONODOX) capsule 100 mg (100 mg Oral Given 4/10/25 1509)     Vital Signs:  Temp:  [98 °F (36.7 °C)] 98 °F (36.7 °C)  Heart Rate:  [] 120  Resp:  [20] 20  BP: (109-120)/(56-85) 120/58        04/10/25  1202   Weight: 136 kg (300 lb)     Body mass index is 45.61 kg/m².    Physical Exam:     Most recent vital Signs: /58   Pulse 120   Temp 98 °F (36.7 °C) (Axillary)   Resp 20   Ht 172.7 cm (68\")   Wt 136 kg (300 lb)   SpO2 91%   BMI 45.61 kg/m²     Physical Exam  General: NAD, resting comfortably in bed, obese, NC   HEENT: Normocephalic, atraumatic, PERRL. neck supple,   Cardiovascular: regularly irregular rhythm (PVCs). No murmurs, rubs, or gallops. Peripheral pulses 2+ bilaterally. No pedal edema,   Pulmonary: Lungs CTAB on ant fields No accessory muscle use, no resp. " "distress.   Abdomen: Soft, non-tender, non-distended. No guarding or rebound.   Extremities: No cyanosis, clubbing or edema. Warm and well-perfused. Capillary refill < 2 s . Extensive LE edema and erythema with scaling bilaterally, obese LE   Neurological: Alert and oriented to person, place, and time. No focal deficits. Cranial nerves II-XII grossly intact. Motor strength 5/5 in all extremities. Sensation grossly intact to light touch.   Psychiatric: Calm, cooperative. Mood and affect appropriate for the situation.    Laboratory data:    I have reviewed the labs done in the emergency room.    Results from last 7 days   Lab Units 04/10/25  1842 04/10/25  1217   SODIUM mmol/L 140 141   POTASSIUM mmol/L 4.7 4.4   CHLORIDE mmol/L 99 99   CO2 mmol/L 33.0* 33.7*   BUN mg/dL 26* 26*   CREATININE mg/dL 0.65 0.63   CALCIUM mg/dL 9.3 9.4   BILIRUBIN mg/dL  --  0.2   ALK PHOS U/L  --  68   ALT (SGPT) U/L  --  11   AST (SGOT) U/L  --  12   GLUCOSE mg/dL 112* 137*     Results from last 7 days   Lab Units 04/10/25  1842 04/10/25  1217   WBC 10*3/mm3 8.41 7.67   HEMOGLOBIN g/dL 11.7* 11.4*   HEMATOCRIT % 37.3 37.2   PLATELETS 10*3/mm3 295 311         Results from last 7 days   Lab Units 04/10/25  1318 04/10/25  1217   HSTROP T ng/L 8 7     Results from last 7 days   Lab Units 04/10/25  1217   PROBNP pg/mL 163.0                       Invalid input(s): \"USDES\", \"NITRITITE\", \"BACT\", \"EP\"    Pain Management Panel           No data to display                EKG:      NSR with PVCs     Radiology:    US Venous Doppler Lower Extremity Bilateral (duplex)  Result Date: 4/10/2025  BILATERAL LOWER EXTREMITY VENOUS DUPLEX DOPPLER EXAMINATION  HISTORY: Bilateral lower extremity swelling and pain, eval DVT  COMPARISON:   None  PROCEDURE: Multiple transverse and longitudinal scans were performed of both femoropopliteal deep venous system, with augmentation and compression maneuvers.  FINDINGS: Proper phasic flow was noted in the visualized " bilateral lower extremity deep venous system. No intraluminal increased echogenicity is noted to suggest thrombus. There is proper compression and augmentation of the venous structures. No abnormal venous collaterals are seen. Subcutaneous edema noted in both lower extremities.      No evidence of left or right lower extremity deep venous thrombosis.      This report was signed and finalized on 4/10/2025 2:29 PM by Nelda Argueta MD.      XR Chest 1 View  Result Date: 4/10/2025  PROCEDURE: XR CHEST 1 VW-  HISTORY: eval edema, shortness of breath.  COMPARISON: May 10, 2024..  FINDINGS: The heart is upper limits of normal, stable.. There is bilateral interstitial disease more prominent in the lung bases, stable from prior.. The mediastinum is unremarkable. There is no pneumothorax. There are no acute osseous abnormalities. Apical lordotic positioning noted.      Stable chest..     This report was signed and finalized on 4/10/2025 1:16 PM by Nelda Argueta MD.        Assessment:    64-year-old female, history of congestive heart failure, bilateral lower extremity edema chronic since age 18 (family history reported), COPD on O2, stroke, SVT, atrial flutter s/p ablation presented for evaluation of bilateral lower extremity pain and swelling ongoing for multiple months    Bilateral lower extremity lymphangitis  ?  Bilateral lower extremity superimposed cellulitis, mild  -No fevers no leukocytosis low procalcitonin  -Ultrasound  Doppler negative for DVT  Lower extremity swelling since 18 years old unclear etiology, positive family history  Obesity  PVCs  Weakness/deconditioning  History of heart failure        Plan:  -Initially patient was going to be discharged from ED however once EMS arrived patient refused to be discharged because she describes he felt too weak.  Patient's insurance will not take her for home health per chart review.  Initially instructed ED that there was no medical need for admission at the time that we  discussed the case however patient was unable to ambulate and discharge was deemed unsafe.  Admitted for placement to rehab.  -Ceftriaxone doxycycline  - Telemetry  - Continue home medications as appropriate  - PT OT case management consult    Risk Assessment: Moderate  DVT Prophylaxis: Lovenox  Code Status: Full          Jimbo Love MD  04/10/25  19:49 EDT    Dictated utilizing Dragon dictation.      Electronically signed by Jimbo Love MD at 04/10/25 2002

## 2025-04-12 LAB
ANION GAP SERPL CALCULATED.3IONS-SCNC: 7.7 MMOL/L (ref 5–15)
BUN SERPL-MCNC: 21 MG/DL (ref 8–23)
BUN/CREAT SERPL: 34.4 (ref 7–25)
CALCIUM SPEC-SCNC: 8.7 MG/DL (ref 8.6–10.5)
CHLORIDE SERPL-SCNC: 97 MMOL/L (ref 98–107)
CO2 SERPL-SCNC: 33.3 MMOL/L (ref 22–29)
CREAT SERPL-MCNC: 0.61 MG/DL (ref 0.57–1)
EGFRCR SERPLBLD CKD-EPI 2021: 98.7 ML/MIN/1.73
GLUCOSE SERPL-MCNC: 100 MG/DL (ref 65–99)
MAGNESIUM SERPL-MCNC: 2.1 MG/DL (ref 1.6–2.4)
MRSA DNA SPEC QL NAA+PROBE: NORMAL
PHOSPHATE SERPL-MCNC: 3.3 MG/DL (ref 2.5–4.5)
POTASSIUM SERPL-SCNC: 3.9 MMOL/L (ref 3.5–5.2)
SODIUM SERPL-SCNC: 138 MMOL/L (ref 136–145)

## 2025-04-12 PROCEDURE — 87641 MR-STAPH DNA AMP PROBE: CPT | Performed by: INTERNAL MEDICINE

## 2025-04-12 PROCEDURE — 87186 SC STD MICRODIL/AGAR DIL: CPT | Performed by: INTERNAL MEDICINE

## 2025-04-12 PROCEDURE — G0378 HOSPITAL OBSERVATION PER HR: HCPCS

## 2025-04-12 PROCEDURE — 25010000002 DOXYCYCLINE 100 MG RECONSTITUTED SOLUTION 1 EACH VIAL: Performed by: STUDENT IN AN ORGANIZED HEALTH CARE EDUCATION/TRAINING PROGRAM

## 2025-04-12 PROCEDURE — 87147 CULTURE TYPE IMMUNOLOGIC: CPT | Performed by: INTERNAL MEDICINE

## 2025-04-12 PROCEDURE — 80048 BASIC METABOLIC PNL TOTAL CA: CPT | Performed by: INTERNAL MEDICINE

## 2025-04-12 PROCEDURE — 63710000001 REVEFENACIN 175 MCG/3ML SOLUTION: Performed by: STUDENT IN AN ORGANIZED HEALTH CARE EDUCATION/TRAINING PROGRAM

## 2025-04-12 PROCEDURE — 87076 CULTURE ANAEROBE IDENT EACH: CPT | Performed by: INTERNAL MEDICINE

## 2025-04-12 PROCEDURE — 94799 UNLISTED PULMONARY SVC/PX: CPT

## 2025-04-12 PROCEDURE — 87205 SMEAR GRAM STAIN: CPT | Performed by: INTERNAL MEDICINE

## 2025-04-12 PROCEDURE — 99232 SBSQ HOSP IP/OBS MODERATE 35: CPT | Performed by: INTERNAL MEDICINE

## 2025-04-12 PROCEDURE — 84100 ASSAY OF PHOSPHORUS: CPT | Performed by: STUDENT IN AN ORGANIZED HEALTH CARE EDUCATION/TRAINING PROGRAM

## 2025-04-12 PROCEDURE — 94664 DEMO&/EVAL PT USE INHALER: CPT

## 2025-04-12 PROCEDURE — 83735 ASSAY OF MAGNESIUM: CPT | Performed by: STUDENT IN AN ORGANIZED HEALTH CARE EDUCATION/TRAINING PROGRAM

## 2025-04-12 PROCEDURE — 87070 CULTURE OTHR SPECIMN AEROBIC: CPT | Performed by: INTERNAL MEDICINE

## 2025-04-12 PROCEDURE — 96366 THER/PROPH/DIAG IV INF ADDON: CPT

## 2025-04-12 PROCEDURE — 25010000002 CEFTRIAXONE PER 250 MG: Performed by: STUDENT IN AN ORGANIZED HEALTH CARE EDUCATION/TRAINING PROGRAM

## 2025-04-12 RX ORDER — HYDROCODONE BITARTRATE AND ACETAMINOPHEN 5; 325 MG/1; MG/1
2 TABLET ORAL EVERY 6 HOURS PRN
Refills: 0 | Status: DISCONTINUED | OUTPATIENT
Start: 2025-04-12 | End: 2025-04-15 | Stop reason: HOSPADM

## 2025-04-12 RX ADMIN — WHITE PETROLATUM 1 APPLICATION: 1.75 OINTMENT TOPICAL at 05:09

## 2025-04-12 RX ADMIN — LEVOTHYROXINE SODIUM 88 MCG: 88 TABLET ORAL at 05:42

## 2025-04-12 RX ADMIN — DOXYCYCLINE 100 MG: 100 INJECTION, POWDER, LYOPHILIZED, FOR SOLUTION INTRAVENOUS at 11:19

## 2025-04-12 RX ADMIN — FUROSEMIDE 80 MG: 40 TABLET ORAL at 08:31

## 2025-04-12 RX ADMIN — METOPROLOL TARTRATE 50 MG: 50 TABLET, FILM COATED ORAL at 20:07

## 2025-04-12 RX ADMIN — Medication 10 ML: at 20:03

## 2025-04-12 RX ADMIN — METOPROLOL TARTRATE 50 MG: 50 TABLET, FILM COATED ORAL at 08:31

## 2025-04-12 RX ADMIN — CEFTRIAXONE 2000 MG: 2 INJECTION, POWDER, FOR SOLUTION INTRAMUSCULAR; INTRAVENOUS at 20:02

## 2025-04-12 RX ADMIN — RIVAROXABAN 20 MG: 10 TABLET, FILM COATED ORAL at 18:07

## 2025-04-12 RX ADMIN — WHITE PETROLATUM 1 APPLICATION: 1.75 OINTMENT TOPICAL at 18:07

## 2025-04-12 RX ADMIN — REVEFENACIN 175 MCG: 175 SOLUTION RESPIRATORY (INHALATION) at 07:25

## 2025-04-12 RX ADMIN — HYDROCODONE BITARTRATE AND ACETAMINOPHEN 2 TABLET: 5; 325 TABLET ORAL at 19:06

## 2025-04-12 RX ADMIN — ARFORMOTEROL TARTRATE 15 MCG: 15 SOLUTION RESPIRATORY (INHALATION) at 07:25

## 2025-04-12 RX ADMIN — HYDROCODONE BITARTRATE AND ACETAMINOPHEN 1 TABLET: 5; 325 TABLET ORAL at 05:38

## 2025-04-12 RX ADMIN — LEVOTHYROXINE SODIUM 200 MCG: 0.1 TABLET ORAL at 05:41

## 2025-04-12 RX ADMIN — Medication 10 ML: at 08:34

## 2025-04-12 RX ADMIN — HYDROCODONE BITARTRATE AND ACETAMINOPHEN 2 TABLET: 5; 325 TABLET ORAL at 11:17

## 2025-04-12 RX ADMIN — DOXYCYCLINE 100 MG: 100 INJECTION, POWDER, LYOPHILIZED, FOR SOLUTION INTRAVENOUS at 23:16

## 2025-04-12 NOTE — PROGRESS NOTES
[facility]    Name:  Patsy Frazier   Age:  64 y.o.  Sex:  female  :  1960  MRN:  [id number]   Visit Number:  [id number]  Admission Date:  4/10/2025  Date Of Service:  25  Primary Care Physician:  Eva Washburn APRN     LOS: 1 day :    Chief Complaint:      Leg swelling and right knee joint pain.    Subjective:    Patsy Frazier was seen and examined this morning.  She is currently lying down on the bed and is comfortable at rest except for right knee joint pain.  Denies any redness.  He does have chronic knee joint osteoarthritis.  She states that while standing up on her legs, her knee was hurting.  Redness in the legs however has improved with antibiotics therapy.    Hospital Course:    Patsy Frazier is a 64-year-old female, history of congestive heart failure, COPD, stroke, SVT and atrial flutter status post ablation on rivaroxaban, presented for evaluation of bilateral lower extremity pain and swelling.  This has been ongoing for multiple months patient reports that the pain and swelling has increased over the last couple of days more than it had been, decreased mobility because of this, she reports that she has been using pads wrapped around her leg to help with the weeping edema. Intermittent oxygen use at 3 L as needed, denies fevers.  Reports that this all started when she had wounds that were previously healed but accidentally nicked one of her legs and created an abrasion .  History of leg edema since age 18.    In the emergency room, she was afebrile and hemodynamically stable saturating at 97% on 3 L of nasal cannula oxygen.  Serial troponin and proBNP were within normal range.  CMP was unremarkable except for glucose of 137.  Lactic acid, procalcitonin and CBC were unremarkable except for hemoglobin of 11.  Chest x-ray was unremarkable.  Venous duplex scan of lower extremities was negative for DVT.  Patient was  noted to have bilateral lower extremity cellulitis and was initiated on antibody therapy with doxycycline and ceftriaxone.  Due to significant generalized weakness and inability to care for self, she was admitted to the medical floor for evaluation by case management for discharge planning.     Review of Systems:     All systems were reviewed and negative except as mentioned in subjective, assessment and plan.    Vital Signs:    Temp:  [97.9 °F (36.6 °C)-98 °F (36.7 °C)] 97.9 °F (36.6 °C)  Heart Rate:  [63-82] 63  Resp:  [16-18] 18  BP: ()/(50-62) 105/53    Intake and output:    I/O last 3 completed shifts:  In: 240 [P.O.:240]  Out: 800 [Urine:800]  I/O this shift:  In: 100 [IV Piggyback:100]  Out: -     Physical Examination:    General Appearance:  Alert and cooperative.  Obese.   Head:  Atraumatic and normocephalic.   Eyes: Conjunctivae and sclerae normal, no icterus. No pallor.   Throat: No oral lesions, no thrush, oral mucosa moist.   Neck: Supple, trachea midline, no thyromegaly.   Lungs:   Breath sounds heard bilaterally equally.  No wheezing or crackles. No Pleural rub or bronchial breathing.   Heart:  Normal S1 and S2, no murmur, no gallop, no rub. No JVD.   Abdomen:   Normal bowel sounds, no masses, no organomegaly. Soft, nontender, obese with a large pannus, no rebound tenderness.   Extremities: Supple, no cyanosis, no clubbing.  3-4+ pitting edema noted bilateral lower extremities up to the knees.  Increased erythema noted bilateral lower legs with excoriations and eczema.  Please see nursing note and photographs in chart for further details.  No significant right knee joint tenderness, swelling or redness noted.   Skin: No bleeding or rash.   Neurologic: Alert and oriented x 3. No facial asymmetry. Moves all four limbs but does have generalized weakness. No tremors.    Laboratory results:    Results from last 7 days   Lab Units 04/12/25  0544 04/11/25  0547 04/10/25  1842 04/10/25  1217   SODIUM  mmol/L 138 135* 140 141   POTASSIUM mmol/L 3.9 4.5 4.7 4.4   CHLORIDE mmol/L 97* 98 99 99   CO2 mmol/L 33.3* 30.8* 33.0* 33.7*   BUN mg/dL 21 25* 26* 26*   CREATININE mg/dL 0.61 0.57 0.65 0.63   CALCIUM mg/dL 8.7 8.7 9.3 9.4   BILIRUBIN mg/dL  --   --   --  0.2   ALK PHOS U/L  --   --   --  68   ALT (SGPT) U/L  --   --   --  11   AST (SGOT) U/L  --   --   --  12   GLUCOSE mg/dL 100* 107* 112* 137*     Results from last 7 days   Lab Units 04/11/25  0547 04/10/25  1842 04/10/25  1217   WBC 10*3/mm3 5.90 8.41 7.67   HEMOGLOBIN g/dL 10.3* 11.7* 11.4*   HEMATOCRIT % 33.2* 37.3 37.2   PLATELETS 10*3/mm3 283 295 311         Results from last 7 days   Lab Units 04/10/25  1318 04/10/25  1217   HSTROP T ng/L 8 7       I have reviewed the patient's laboratory results.    Radiology results:    US Venous Doppler Lower Extremity Bilateral (duplex)  Result Date: 4/10/2025  BILATERAL LOWER EXTREMITY VENOUS DUPLEX DOPPLER EXAMINATION  HISTORY: Bilateral lower extremity swelling and pain, eval DVT  COMPARISON:   None  PROCEDURE: Multiple transverse and longitudinal scans were performed of both femoropopliteal deep venous system, with augmentation and compression maneuvers.  FINDINGS: Proper phasic flow was noted in the visualized bilateral lower extremity deep venous system. No intraluminal increased echogenicity is noted to suggest thrombus. There is proper compression and augmentation of the venous structures. No abnormal venous collaterals are seen. Subcutaneous edema noted in both lower extremities.      Impression: No evidence of left or right lower extremity deep venous thrombosis.      This report was signed and finalized on 4/10/2025 2:29 PM by Nelda Argueta MD.      XR Chest 1 View  Result Date: 4/10/2025  PROCEDURE: XR CHEST 1 VW-  HISTORY: eval edema, shortness of breath.  COMPARISON: May 10, 2024..  FINDINGS: The heart is upper limits of normal, stable.. There is bilateral interstitial disease more prominent in the lung  bases, stable from prior.. The mediastinum is unremarkable. There is no pneumothorax. There are no acute osseous abnormalities. Apical lordotic positioning noted.      Impression: Stable chest..     This report was signed and finalized on 4/10/2025 1:16 PM by Nelda Argueta MD.      I have reviewed the patient's radiology reports.    Medication Review:     I have reviewed the patient's active and prn medications.     Problem List:      Weakness    Lower extremity cellulitis    Obesity, Class III, BMI 40-49.9 (morbid obesity)    Assessment:    Bilateral lower extremity cellulitis, POA.  Chronic bilateral lower venous insufficiency and lymphedema.  History of atrial flutter status post ablation on rivaroxaban.  Morbid obesity with a BMI of 47.  Right knee joint osteoarthritis.  Acquired hypothyroidism.  COPD, no exacerbation.  Impaired mobility and ADLs.  No evidence of CHF.    Plan:    Bilateral lower extremity cellulitis/lymphedema.  - Continue ceftriaxone and doxycycline.  - Patient was seen by wound care services and their recommendations appreciated.  - Obtain nasal swab for MRSA screen.  - Keep extremities elevated.  - Bilateral lower extremity venous duplex was negative for DVT.  - Obtain wound culture.    History of atrial flutter status post ablation.  - Continue rivaroxaban, metoprolol.    COPD/hypothyroidism.  - Continue home medications.    Right knee joint osteoarthritis.  - We will place her on Lortab for pain control.  - Continue physical therapy.    Discussed with nursing staff.    I have reviewed the copied text and it is accurate as of 04/12/25    DVT Prophylaxis: Rivaroxaban  Code Status: Full  Diet: Cardiac diabetic  Discharge Plan: Awaiting short-term rehab placement.    John Wills MD  04/12/25  12:09 EDT    Dictated utilizing Dragon dictation.

## 2025-04-12 NOTE — PLAN OF CARE
Goal Outcome Evaluation:         No acute events noted during this shift. VSS. Awaiting for placement for STR. Care continues.

## 2025-04-13 PROCEDURE — 94799 UNLISTED PULMONARY SVC/PX: CPT

## 2025-04-13 PROCEDURE — G0378 HOSPITAL OBSERVATION PER HR: HCPCS

## 2025-04-13 PROCEDURE — 63710000001 REVEFENACIN 175 MCG/3ML SOLUTION: Performed by: STUDENT IN AN ORGANIZED HEALTH CARE EDUCATION/TRAINING PROGRAM

## 2025-04-13 PROCEDURE — 25010000002 DOXYCYCLINE 100 MG RECONSTITUTED SOLUTION 1 EACH VIAL: Performed by: STUDENT IN AN ORGANIZED HEALTH CARE EDUCATION/TRAINING PROGRAM

## 2025-04-13 PROCEDURE — 96367 TX/PROPH/DG ADDL SEQ IV INF: CPT

## 2025-04-13 PROCEDURE — 94640 AIRWAY INHALATION TREATMENT: CPT

## 2025-04-13 PROCEDURE — 99232 SBSQ HOSP IP/OBS MODERATE 35: CPT | Performed by: INTERNAL MEDICINE

## 2025-04-13 PROCEDURE — 94761 N-INVAS EAR/PLS OXIMETRY MLT: CPT

## 2025-04-13 PROCEDURE — 96366 THER/PROPH/DIAG IV INF ADDON: CPT

## 2025-04-13 PROCEDURE — 97110 THERAPEUTIC EXERCISES: CPT

## 2025-04-13 PROCEDURE — 25010000002 CEFTRIAXONE PER 250 MG: Performed by: STUDENT IN AN ORGANIZED HEALTH CARE EDUCATION/TRAINING PROGRAM

## 2025-04-13 RX ADMIN — ARFORMOTEROL TARTRATE 15 MCG: 15 SOLUTION RESPIRATORY (INHALATION) at 07:39

## 2025-04-13 RX ADMIN — FUROSEMIDE 80 MG: 40 TABLET ORAL at 08:31

## 2025-04-13 RX ADMIN — LEVOTHYROXINE SODIUM 88 MCG: 88 TABLET ORAL at 03:45

## 2025-04-13 RX ADMIN — LEVOTHYROXINE SODIUM 200 MCG: 0.1 TABLET ORAL at 03:45

## 2025-04-13 RX ADMIN — ARFORMOTEROL TARTRATE 15 MCG: 15 SOLUTION RESPIRATORY (INHALATION) at 18:48

## 2025-04-13 RX ADMIN — DOXYCYCLINE 100 MG: 100 INJECTION, POWDER, LYOPHILIZED, FOR SOLUTION INTRAVENOUS at 23:52

## 2025-04-13 RX ADMIN — HYDROCODONE BITARTRATE AND ACETAMINOPHEN 2 TABLET: 5; 325 TABLET ORAL at 03:45

## 2025-04-13 RX ADMIN — Medication 10 ML: at 08:32

## 2025-04-13 RX ADMIN — WHITE PETROLATUM 1 APPLICATION: 1.75 OINTMENT TOPICAL at 03:46

## 2025-04-13 RX ADMIN — HYDROCODONE BITARTRATE AND ACETAMINOPHEN 2 TABLET: 5; 325 TABLET ORAL at 15:22

## 2025-04-13 RX ADMIN — WHITE PETROLATUM 1 APPLICATION: 1.75 OINTMENT TOPICAL at 17:09

## 2025-04-13 RX ADMIN — DOXYCYCLINE 100 MG: 100 INJECTION, POWDER, LYOPHILIZED, FOR SOLUTION INTRAVENOUS at 11:10

## 2025-04-13 RX ADMIN — CEFTRIAXONE 2000 MG: 2 INJECTION, POWDER, FOR SOLUTION INTRAMUSCULAR; INTRAVENOUS at 20:00

## 2025-04-13 RX ADMIN — RIVAROXABAN 20 MG: 10 TABLET, FILM COATED ORAL at 17:09

## 2025-04-13 RX ADMIN — REVEFENACIN 175 MCG: 175 SOLUTION RESPIRATORY (INHALATION) at 07:39

## 2025-04-13 NOTE — PLAN OF CARE
Goal Outcome Evaluation:  Plan of Care Reviewed With: patient           Outcome Evaluation: PT treatment performed bedside with pt presenting with resting pain in RLE rated 8/10 and LLE 7/10. Pt had received pain medication prior to PT treatment. Pt did agree to try to participate in PT treatment asking to begin with BLE ther ex as this is usually less painful than standing on BLE. AAROM heelslides x 5 increased pt's pain to 10/10. Pt was not able to continue PT treatment due to the pain. At rest pain in BLE 8/10. Pt's O2  sat reading 86% but pt did not appear in distress on 2 L NC O2. Therapist did change sensor and pt's O2 sat at 92%. Pt to continue PT POC.

## 2025-04-13 NOTE — THERAPY TREATMENT NOTE
"Patient Name: Patsy Frazier  : 1960    MRN: [id number]                              Today's Date: 2025       Admit Date: 4/10/2025    Visit Dx:     ICD-10-CM ICD-9-CM   1. Peripheral edema  R60.0 782.3   2. Wound of left lower extremity, initial encounter  S81.802A 894.0   3. Wound of right lower extremity, initial encounter  S81.801A 894.0   4. Bilateral cellulitis of lower leg  L03.116 682.6    L03.115      Patient Active Problem List   Diagnosis    Atrial flutter with rapid ventricular response    Tobacco abuse    SVT (supraventricular tachycardia)    Atrial flutter    Acute hypoxic respiratory failure    Hypoxia    COPD (chronic obstructive pulmonary disease)    Bilateral lower extremity edema    Weakness    Lower extremity cellulitis    Obesity, Class III, BMI 40-49.9 (morbid obesity)     Past Medical History:   Diagnosis Date    Arthritis     Asthma     CHF (congestive heart failure)     COPD (chronic obstructive pulmonary disease)     Hypothyroidism     Stroke     \"mini stroke\" at the age of 16, no deficits per pt report     SVT (supraventricular tachycardia)     prior hx of     Wears glasses      Past Surgical History:   Procedure Laterality Date    CARDIAC ELECTROPHYSIOLOGY PROCEDURE N/A 11/3/2017    Procedure: Ablation atrial flutter vs SVT;  Surgeon: Chavo Carr MD;  Location: [facility] INVASIVE LOCATION;  Service:     CHOLECYSTECTOMY      NECK SURGERY      TUBAL ABDOMINAL LIGATION        General Information       Row Name 25 1609          Physical Therapy Time and Intention    Document Type therapy note (daily note)  -TW     Mode of Treatment physical therapy  -TW       Row Name 25 1609          General Information    Patient Profile Reviewed yes  -TW     Existing Precautions/Restrictions fall  -TW       Row Name 25 1609          Cognition    Orientation Status (Cognition) oriented x 4  -TW       Row Name 25 1609          Safety " Issues/Impairments Affecting Functional Mobility    Safety Issues Affecting Function (Mobility) safety precaution awareness;safety precautions follow-through/compliance;friction/shear risk  -TW     Impairments Affecting Function (Mobility) pain;balance;strength;endurance/activity tolerance  -TW               User Key  (r) = Recorded By, (t) = Taken By, (c) = Cosigned By      Initials Name Provider Type    Amanda Gomez, XENIA Physical Therapist                   Mobility       Row Name 04/13/25 1609          Bed Mobility    All Activities, [place] (Bed Mobility) moderate assist (50% patient effort)  -TW     Assistive Device (Bed Mobility) head of bed elevated;bed rails  -TW     Comment, (Bed Mobility) Pt needed mod assist to shift her hips to the L so that she was not so close to R EOB by using bed rails  -TW       Row Name 04/13/25 1609          Transfers    Comment, (Transfers) Pt having 8/10 RLE and 7/10 LLE pain at rest and with movement 10/10 and was not able to tolerate OOB transfers this date.  -       Row Name 04/13/25 1609          Bed-Chair Transfer    Bed-Chair [place] (Transfers) unable to assess  -TW       [facility] Name 04/13/25 1609          Gait/Stairs (Locomotion)    Patient was able to Ambulate no, other medical factors prevent ambulation  -TW     Reason Patient was unable to Ambulate Uncontrolled Pain  -TW               User Key  (r) = Recorded By, (t) = Taken By, (c) = Cosigned By      Initials Name Provider Type    Amanda Gomez PT Physical Therapist                   Obj/Interventions       Row Name 04/13/25 1609          Motor Skills    Therapeutic Exercise other (see comments)  Attempted BLE AAROM but any movement of BLE would increase pt's pain to 10/10 at this time so post 5 reps of assisted heelslides, ther ex halted.  -TW               User Key  (r) = Recorded By, (t) = Taken By, (c) = Cosigned By      Initials Name Provider Type    Amanda Gomez PT Physical Therapist                    Goals/Plan    No documentation.                  Clinical Impression       Row Name 04/13/25 1609          Pain    Pretreatment Pain Rating 8/10  -TW     Posttreatment Pain Rating 8/10  -TW     Pain Location extremity  -TW     Pain Side/Orientation right;lower  -TW     Pain Management Interventions other (see comments)  -TW     Pre/Posttreatment Pain Comment Pain in LLE rated 7/10 at rest. With any movement of either LE pain increased to 10/10. Pt had already received oral pain medication from nursing prior to PT treatment and was not able to have any othe pain medication at this time.  -TW       Row Name 04/13/25 1609          Plan of Care Review    Plan of Care Reviewed With patient  -TW     Outcome Evaluation PT treatment performed bedside with pt presenting with resting pain in RLE rated 8/10 and LLE 7/10. Pt had received pain medication prior to PT treatment. Pt did agree to try to participate in PT treatment asking to begin with BLE ther ex as this is usually less painful than standing on BLE. AAROM heelslides x 5 increased pt's pain to 10/10. Pt was not able to continue PT treatment due to the pain. At rest pain in BLE 8/10. Pt's O2  sat reading 86% but pt did not appear in distress on 2 L NC O2. Therapist did change sensor and pt's O2 sat at 92%. Pt to continue PT POC.  -TW       Row Name 04/13/25 1609          Vital Signs    Pre SpO2 (%) 91  -TW     O2 Delivery Pre Treatment supplemental O2  2 L  -TW     Post SpO2 (%) 92  -TW     O2 Delivery Post Treatment supplemental O2  2 L  -TW     Pre Patient Position Supine  -TW     Intra Patient Position Supine  -TW     Post Patient Position Supine  -TW       Row Name 04/13/25 1609          Positioning and Restraints    Pre-Treatment Position in bed  -TW     Post Treatment Position bed  -TW     In Bed fowlers;side rails up x3;with nsg;call light within reach;encouraged to call for assist  -TW               User Key  (r) = Recorded By, (t) = Taken By, (c)  = Cosigned By      Initials Name Provider Type    Amanda Gomez, PT Physical Therapist                   Outcome Measures       Row Name 04/13/25 1609 04/13/25 0834       How much help from another person do you currently need...    Turning from your back to your side while in flat bed without using bedrails? 3  -TW 3  -BW    Moving from lying on back to sitting on the side of a flat bed without bedrails? 3  -TW 3  -BW    Moving to and from a bed to a chair (including a wheelchair)? 2  -TW 2  -BW    Standing up from a chair using your arms (e.g., wheelchair, bedside chair)? 2  -TW 2  -BW    Climbing 3-5 steps with a railing? 1  -TW 1  -BW    To walk in hospital room? 2  -TW 2  -BW    AM-PAC 6 Clicks Score (PT) 13  -TW 13  -BW    Highest Level of Mobility Goal 4 --> Transfer to chair/commode  -TW 4 --> Transfer to chair/commode  -BW      Row Name 04/13/25 1609          Functional Assessment    Outcome Measure Options AM-PAC 6 Clicks Basic Mobility (PT)  -TW               User Key  (r) = Recorded By, (t) = Taken By, (c) = Cosigned By      Initials Name Provider Type    Amanda Gomez, XENIA Physical Therapist    Grecia Champion, RN Registered Nurse                                 Physical Therapy Education       Title: PT OT SLP Therapies (In Progress)       Topic: Physical Therapy (In Progress)       Point: Mobility training (Done)       Learning Progress Summary            Patient Acceptance, E,D, DU,VU by  at 4/11/2025 1331    Comment: educated on purpose of PT and importance of mobility                      Point: Home exercise program (Not Started)       Learner Progress:  Not documented in this visit.              Point: Body mechanics (Done)       Learning Progress Summary            Patient Acceptance, E,D, DU,VU by  at 4/11/2025 1331    Comment: educated on purpose of PT and importance of mobility                      Point: Precautions (Not Started)       Learner Progress:  Not documented in this  visit.                              User Key       Initials Effective Dates Name Provider Type Discipline     06/13/23 -  Fabio Denton PT Physical Therapist PT                  PT Recommendation and Plan     Outcome Evaluation: PT treatment performed bedside with pt presenting with resting pain in RLE rated 8/10 and LLE 7/10. Pt had received pain medication prior to PT treatment. Pt did agree to try to participate in PT treatment asking to begin with BLE ther ex as this is usually less painful than standing on BLE. AAROM heelslides x 5 increased pt's pain to 10/10. Pt was not able to continue PT treatment due to the pain. At rest pain in BLE 8/10. Pt's O2  sat reading 86% but pt did not appear in distress on 2 L NC O2. Therapist did change sensor and pt's O2 sat at 92%. Pt to continue PT POC.     Time Calculation:         PT Charges       Row Name 04/13/25 1609             Time Calculation    Start Time 1557  -TW      Stop Time 1609  -TW      Time Calculation (min) 12 min  -TW      PT Received On 04/13/25  -TW         Timed Charges    78053 - PT Therapeutic Exercise Minutes 12  -TW         Total Minutes    Timed Charges Total Minutes 12  -TW       Total Minutes 12  -TW                User Key  (r) = Recorded By, (t) = Taken By, (c) = Cosigned By      Initials Name Provider Type    TW Amanda Joshi PT Physical Therapist                  Therapy Charges for Today       Code Description Service Date Service Provider Modifiers Qty    01109272834 HC PT THER PROC EA 15 MIN 4/13/2025 Amanda Joshi PT GP 1            PT G-Codes  Outcome Measure Options: AM-PAC 6 Clicks Basic Mobility (PT)  AM-PAC 6 Clicks Score (PT): 13  AM-PAC 6 Clicks Score (OT): 15       Amanda Joshi PT  4/13/2025

## 2025-04-13 NOTE — PROGRESS NOTES
[facility]    Name:  Patsy Frazier   Age:  64 y.o.  Sex:  female  :  1960  MRN:  [id number]   Visit Number:  [id number]  Admission Date:  4/10/2025  Date Of Service:  25  Primary Care Physician:  Eva Washburn APRN     LOS: 1 day :    Chief Complaint:      Leg swelling and right knee joint pain.    Subjective:    Patsy Frazier was seen and examined this morning.  She is lying down on the bed and is comfortable at rest.  After increasing the dose of Lortab, she states that her left knee joint pain is better.  Denies any new complaints.    Hospital Course:    Patsy Frazier is a 64-year-old female, history of congestive heart failure, COPD, stroke, SVT and atrial flutter status post ablation on rivaroxaban, presented for evaluation of bilateral lower extremity pain and swelling.  This has been ongoing for multiple months patient reports that the pain and swelling has increased over the last couple of days more than it had been, decreased mobility because of this, she reports that she has been using pads wrapped around her leg to help with the weeping edema. Intermittent oxygen use at 3 L as needed, denies fevers.  Reports that this all started when she had wounds that were previously healed but accidentally nicked one of her legs and created an abrasion .  History of leg edema since age 18.    In the emergency room, she was afebrile and hemodynamically stable saturating at 97% on 3 L of nasal cannula oxygen.  Serial troponin and proBNP were within normal range.  CMP was unremarkable except for glucose of 137.  Lactic acid, procalcitonin and CBC were unremarkable except for hemoglobin of 11.  Chest x-ray was unremarkable.  Venous duplex scan of lower extremities was negative for DVT.  Patient was noted to have bilateral lower extremity cellulitis and was initiated on antibody therapy with doxycycline and ceftriaxone.  Due to  significant generalized weakness and inability to care for self, she was admitted to the medical floor for evaluation by case management for discharge planning.     Review of Systems:     All systems were reviewed and negative except as mentioned in subjective, assessment and plan.    Vital Signs:    Temp:  [98.1 °F (36.7 °C)-99 °F (37.2 °C)] 98.3 °F (36.8 °C)  Heart Rate:  [68-86] 77  Resp:  [16-20] 20  BP: ()/(40-60) 110/60    Intake and output:    I/O last 3 completed shifts:  In: 340 [P.O.:240; IV Piggyback:100]  Out: 1000 [Urine:1000]  I/O this shift:  In: 240 [P.O.:240]  Out: 1200 [Urine:1200]    Physical Examination:    General Appearance:  Alert and cooperative.  Obese.   Head:  Atraumatic and normocephalic.   Eyes: Conjunctivae and sclerae normal, no icterus. No pallor.   Throat: No oral lesions, no thrush, oral mucosa moist.   Neck: Supple, trachea midline, no thyromegaly.   Lungs:   Breath sounds heard bilaterally equally.  No wheezing or crackles. No Pleural rub or bronchial breathing.   Heart:  Normal S1 and S2, no murmur, no gallop, no rub. No JVD.   Abdomen:   Normal bowel sounds, no masses, no organomegaly. Soft, nontender, obese with a large pannus, no rebound tenderness.   Extremities: Supple, no cyanosis, no clubbing.  3-4+ pitting edema noted bilateral lower extremities up to the knees.  Increased erythema noted bilateral lower legs with excoriations and eczema.  Please see nursing note and photographs in chart for further details.  No significant right knee joint tenderness, swelling or redness noted.   Skin: No bleeding or rash.   Neurologic: Alert and oriented x 3. No facial asymmetry. Moves all four limbs but does have generalized weakness. No tremors.    Laboratory results:    Results from last 7 days   Lab Units 04/12/25  0544 04/11/25  0547 04/10/25  1842 04/10/25  1217   SODIUM mmol/L 138 135* 140 141   POTASSIUM mmol/L 3.9 4.5 4.7 4.4   CHLORIDE mmol/L 97* 98 99 99   CO2 mmol/L  33.3* 30.8* 33.0* 33.7*   BUN mg/dL 21 25* 26* 26*   CREATININE mg/dL 0.61 0.57 0.65 0.63   CALCIUM mg/dL 8.7 8.7 9.3 9.4   BILIRUBIN mg/dL  --   --   --  0.2   ALK PHOS U/L  --   --   --  68   ALT (SGPT) U/L  --   --   --  11   AST (SGOT) U/L  --   --   --  12   GLUCOSE mg/dL 100* 107* 112* 137*     Results from last 7 days   Lab Units 04/11/25  0547 04/10/25  1842 04/10/25  1217   WBC 10*3/mm3 5.90 8.41 7.67   HEMOGLOBIN g/dL 10.3* 11.7* 11.4*   HEMATOCRIT % 33.2* 37.3 37.2   PLATELETS 10*3/mm3 283 295 311         Results from last 7 days   Lab Units 04/10/25  1318 04/10/25  1217   HSTROP T ng/L 8 7       I have reviewed the patient's laboratory results.    Radiology results:    No radiology results from the last 24 hrs    I have reviewed the patient's radiology reports.    Medication Review:     I have reviewed the patient's active and prn medications.     Problem List:      Weakness    Lower extremity cellulitis    Obesity, Class III, BMI 40-49.9 (morbid obesity)    Assessment:    Bilateral lower extremity cellulitis, POA.  Chronic bilateral lower venous insufficiency and lymphedema.  History of atrial flutter status post ablation on rivaroxaban.  Morbid obesity with a BMI of 47.  Right knee joint osteoarthritis.  Acquired hypothyroidism.  COPD, no exacerbation.  Impaired mobility and ADLs.  No evidence of CHF.    Plan:    Bilateral lower extremity cellulitis/lymphedema.  - Continue ceftriaxone and doxycycline.  - Patient was seen by wound care services and their recommendations appreciated.  - Obtain nasal swab for MRSA screen.  - Keep extremities elevated.  - Bilateral lower extremity venous duplex was negative for DVT.  - Obtain wound culture.    History of atrial flutter status post ablation.  - Continue rivaroxaban, metoprolol.    COPD/hypothyroidism.  - Continue home medications.    Right knee joint osteoarthritis.  - Continue Lortab for pain control.  - Continue physical therapy.    I have reviewed the  copied text and it is accurate as of 04/13/25    DVT Prophylaxis: Rivaroxaban  Code Status: Full  Diet: Cardiac diabetic  Discharge Plan: Awaiting short-term rehab placement.    John Wills MD  04/13/25  11:59 EDT    Dictated utilizing Dragon dictation.

## 2025-04-13 NOTE — PLAN OF CARE
Problem: Adult Inpatient Plan of Care  Goal: Plan of Care Review  Outcome: Progressing  Flowsheets (Taken 4/12/2025 2345)  Progress: no change  Plan of Care Reviewed With: patient  Goal: Patient-Specific Goal (Individualized)  Outcome: Progressing  Goal: Absence of Hospital-Acquired Illness or Injury  Outcome: Progressing  Intervention: Identify and Manage Fall Risk  Recent Flowsheet Documentation  Taken 4/12/2025 2200 by Fabio Knight RN  Safety Promotion/Fall Prevention:   activity supervised   assistive device/personal items within reach   fall prevention program maintained   nonskid shoes/slippers when out of bed   safety round/check completed  Taken 4/12/2025 2000 by Fabio Knight RN  Safety Promotion/Fall Prevention:   activity supervised   assistive device/personal items within reach   fall prevention program maintained   nonskid shoes/slippers when out of bed   safety round/check completed  Intervention: Prevent Skin Injury  Recent Flowsheet Documentation  Taken 4/12/2025 2200 by Fabio Knight RN  Body Position:   right   side-lying  Taken 4/12/2025 2000 by Fabio Knight RN  Body Position:   tilted   left  Intervention: Prevent Infection  Recent Flowsheet Documentation  Taken 4/12/2025 2200 by Fabio Knight RN  Infection Prevention:   environmental surveillance performed   equipment surfaces disinfected   single patient room provided   hand hygiene promoted   rest/sleep promoted  Taken 4/12/2025 2000 by Fabio Knight RN  Infection Prevention:   environmental surveillance performed   equipment surfaces disinfected   single patient room provided   hand hygiene promoted   rest/sleep promoted  Goal: Optimal Comfort and Wellbeing  Outcome: Progressing  Goal: Readiness for Transition of Care  Outcome: Progressing     Problem: Skin Injury Risk Increased  Goal: Skin Health and Integrity  Outcome: Progressing  Intervention: Optimize Skin Protection  Recent Flowsheet Documentation  Taken 4/12/2025  2200 by Fabio Knight, RN  Activity Management: activity encouraged  Head of Bed (HOB) Positioning: HOB elevated  Taken 4/12/2025 2000 by Fabio Knight, RN  Activity Management: activity encouraged  Head of Bed (HOB) Positioning: HOB elevated     Problem: Comorbidity Management  Goal: Maintenance of COPD Symptom Control  Outcome: Progressing  Goal: Maintenance of Heart Failure Symptom Control  Outcome: Progressing     Problem: Fall Injury Risk  Goal: Absence of Fall and Fall-Related Injury  Outcome: Progressing  Intervention: Promote Injury-Free Environment  Recent Flowsheet Documentation  Taken 4/12/2025 2200 by Fabio Knight, RN  Safety Promotion/Fall Prevention:   activity supervised   assistive device/personal items within reach   fall prevention program maintained   nonskid shoes/slippers when out of bed   safety round/check completed  Taken 4/12/2025 2000 by Fabio Knight, RN  Safety Promotion/Fall Prevention:   activity supervised   assistive device/personal items within reach   fall prevention program maintained   nonskid shoes/slippers when out of bed   safety round/check completed   Goal Outcome Evaluation:  Plan of Care Reviewed With: patient        Progress: no change

## 2025-04-13 NOTE — PLAN OF CARE
Problem: Adult Inpatient Plan of Care  Goal: Plan of Care Review  Outcome: Progressing  Flowsheets (Taken 4/13/2025 1636)  Progress: no change  Outcome Evaluation: VSS on 2LNC, no acute events noted today. PRN pain  medication given for pain, see MAR. Wound care completed. Plan of care continued.  Plan of Care Reviewed With: patient  Goal: Patient-Specific Goal (Individualized)  Outcome: Progressing  Goal: Absence of Hospital-Acquired Illness or Injury  Outcome: Progressing  Intervention: Identify and Manage Fall Risk  Recent Flowsheet Documentation  Taken 4/13/2025 1600 by Grecia Olivier RN  Safety Promotion/Fall Prevention:   safety round/check completed   activity supervised   assistive device/personal items within reach   clutter free environment maintained   fall prevention program maintained   nonskid shoes/slippers when out of bed  Taken 4/13/2025 1400 by Grecia Olivier RN  Safety Promotion/Fall Prevention:   safety round/check completed   activity supervised   assistive device/personal items within reach   clutter free environment maintained   fall prevention program maintained   nonskid shoes/slippers when out of bed  Taken 4/13/2025 1200 by Grecia Olivier RN  Safety Promotion/Fall Prevention: safety round/check completed  Taken 4/13/2025 1000 by Grecia Olivier RN  Safety Promotion/Fall Prevention:   safety round/check completed   activity supervised   assistive device/personal items within reach   clutter free environment maintained   fall prevention program maintained   nonskid shoes/slippers when out of bed  Taken 4/13/2025 0834 by Grecia Olivier RN  Safety Promotion/Fall Prevention:   safety round/check completed   activity supervised   assistive device/personal items within reach   clutter free environment maintained  Intervention: Prevent Skin Injury  Recent Flowsheet Documentation  Taken 4/13/2025 1600 by Grecia Olivier RN  Body Position:   patient/family refused   sitting  up in bed  Taken 4/13/2025 1400 by Grecia Olivier RN  Body Position:   tilted   left  Taken 4/13/2025 1200 by Grecia Olivier RN  Body Position:   patient/family refused   sitting up in bed  Taken 4/13/2025 1000 by Grecia Olivier RN  Body Position: patient/family refused  Taken 4/13/2025 0834 by Grecia Olivier RN  Body Position: sitting up in bed  Intervention: Prevent Infection  Recent Flowsheet Documentation  Taken 4/13/2025 1600 by Grecia Olivier RN  Infection Prevention:   environmental surveillance performed   hand hygiene promoted   rest/sleep promoted  Taken 4/13/2025 1400 by Grecia Olivier RN  Infection Prevention:   environmental surveillance performed   hand hygiene promoted   rest/sleep promoted  Taken 4/13/2025 1200 by Grecia Olivier RN  Infection Prevention:   environmental surveillance performed   rest/sleep promoted  Taken 4/13/2025 1000 by Grecia Olivier RN  Infection Prevention:   environmental surveillance performed   hand hygiene promoted   rest/sleep promoted  Taken 4/13/2025 0834 by Grecia Olivier RN  Infection Prevention:   environmental surveillance performed   rest/sleep promoted  Goal: Optimal Comfort and Wellbeing  Outcome: Progressing  Intervention: Monitor Pain and Promote Comfort  Recent Flowsheet Documentation  Taken 4/13/2025 1200 by Grecia Olivier RN  Pain Management Interventions: medication offered but refused  Intervention: Provide Person-Centered Care  Recent Flowsheet Documentation  Taken 4/13/2025 0834 by Grecia Olivier RN  Trust Relationship/Rapport:   care explained   questions answered  Goal: Readiness for Transition of Care  Outcome: Progressing   Goal Outcome Evaluation:  Plan of Care Reviewed With: patient        Progress: no change  Outcome Evaluation: VSS on 2LNC, no acute events noted today. PRN pain  medication given for pain, see MAR. Wound care completed. Plan of care continued.

## 2025-04-14 LAB
ANION GAP SERPL CALCULATED.3IONS-SCNC: 7.4 MMOL/L (ref 5–15)
BUN SERPL-MCNC: 16 MG/DL (ref 8–23)
BUN/CREAT SERPL: 29.6 (ref 7–25)
CALCIUM SPEC-SCNC: 8.5 MG/DL (ref 8.6–10.5)
CHLORIDE SERPL-SCNC: 95 MMOL/L (ref 98–107)
CO2 SERPL-SCNC: 35.6 MMOL/L (ref 22–29)
CREAT SERPL-MCNC: 0.54 MG/DL (ref 0.57–1)
DEPRECATED RDW RBC AUTO: 50.6 FL (ref 37–54)
EGFRCR SERPLBLD CKD-EPI 2021: 101.7 ML/MIN/1.73
ERYTHROCYTE [DISTWIDTH] IN BLOOD BY AUTOMATED COUNT: 14 % (ref 12.3–15.4)
GLUCOSE SERPL-MCNC: 103 MG/DL (ref 65–99)
HCT VFR BLD AUTO: 34.8 % (ref 34–46.6)
HGB BLD-MCNC: 11 G/DL (ref 12–15.9)
MCH RBC QN AUTO: 30.8 PG (ref 26.6–33)
MCHC RBC AUTO-ENTMCNC: 31.6 G/DL (ref 31.5–35.7)
MCV RBC AUTO: 97.5 FL (ref 79–97)
PLATELET # BLD AUTO: 270 10*3/MM3 (ref 140–450)
PMV BLD AUTO: 9.3 FL (ref 6–12)
POTASSIUM SERPL-SCNC: 3.6 MMOL/L (ref 3.5–5.2)
POTASSIUM SERPL-SCNC: 4.1 MMOL/L (ref 3.5–5.2)
RBC # BLD AUTO: 3.57 10*6/MM3 (ref 3.77–5.28)
SODIUM SERPL-SCNC: 138 MMOL/L (ref 136–145)
WBC NRBC COR # BLD AUTO: 7.46 10*3/MM3 (ref 3.4–10.8)

## 2025-04-14 PROCEDURE — 94799 UNLISTED PULMONARY SVC/PX: CPT

## 2025-04-14 PROCEDURE — 94664 DEMO&/EVAL PT USE INHALER: CPT

## 2025-04-14 PROCEDURE — G0378 HOSPITAL OBSERVATION PER HR: HCPCS

## 2025-04-14 PROCEDURE — 25010000002 DOXYCYCLINE 100 MG RECONSTITUTED SOLUTION 1 EACH VIAL: Performed by: STUDENT IN AN ORGANIZED HEALTH CARE EDUCATION/TRAINING PROGRAM

## 2025-04-14 PROCEDURE — 63710000001 REVEFENACIN 175 MCG/3ML SOLUTION: Performed by: STUDENT IN AN ORGANIZED HEALTH CARE EDUCATION/TRAINING PROGRAM

## 2025-04-14 PROCEDURE — 97530 THERAPEUTIC ACTIVITIES: CPT

## 2025-04-14 PROCEDURE — 99232 SBSQ HOSP IP/OBS MODERATE 35: CPT | Performed by: INTERNAL MEDICINE

## 2025-04-14 PROCEDURE — 80048 BASIC METABOLIC PNL TOTAL CA: CPT | Performed by: INTERNAL MEDICINE

## 2025-04-14 PROCEDURE — 84132 ASSAY OF SERUM POTASSIUM: CPT | Performed by: INTERNAL MEDICINE

## 2025-04-14 PROCEDURE — 85027 COMPLETE CBC AUTOMATED: CPT | Performed by: INTERNAL MEDICINE

## 2025-04-14 PROCEDURE — 96366 THER/PROPH/DIAG IV INF ADDON: CPT

## 2025-04-14 PROCEDURE — 25010000002 CEFTRIAXONE PER 250 MG: Performed by: STUDENT IN AN ORGANIZED HEALTH CARE EDUCATION/TRAINING PROGRAM

## 2025-04-14 PROCEDURE — 94761 N-INVAS EAR/PLS OXIMETRY MLT: CPT

## 2025-04-14 RX ORDER — POTASSIUM CHLORIDE 1500 MG/1
40 TABLET, EXTENDED RELEASE ORAL EVERY 4 HOURS
Status: COMPLETED | OUTPATIENT
Start: 2025-04-14 | End: 2025-04-14

## 2025-04-14 RX ORDER — CEPHALEXIN 500 MG/1
500 CAPSULE ORAL 3 TIMES DAILY
Start: 2025-04-14 | End: 2025-04-15

## 2025-04-14 RX ORDER — DOXYCYCLINE 100 MG/1
100 CAPSULE ORAL 2 TIMES DAILY
Start: 2025-04-14 | End: 2025-04-15

## 2025-04-14 RX ADMIN — LEVOTHYROXINE SODIUM 200 MCG: 0.1 TABLET ORAL at 06:41

## 2025-04-14 RX ADMIN — ARFORMOTEROL TARTRATE 15 MCG: 15 SOLUTION RESPIRATORY (INHALATION) at 20:04

## 2025-04-14 RX ADMIN — RIVAROXABAN 20 MG: 10 TABLET, FILM COATED ORAL at 18:07

## 2025-04-14 RX ADMIN — Medication 10 ML: at 21:05

## 2025-04-14 RX ADMIN — WHITE PETROLATUM 1 APPLICATION: 1.75 OINTMENT TOPICAL at 18:08

## 2025-04-14 RX ADMIN — WHITE PETROLATUM 1 APPLICATION: 1.75 OINTMENT TOPICAL at 05:13

## 2025-04-14 RX ADMIN — CEFTRIAXONE 2000 MG: 2 INJECTION, POWDER, FOR SOLUTION INTRAMUSCULAR; INTRAVENOUS at 21:05

## 2025-04-14 RX ADMIN — FUROSEMIDE 80 MG: 40 TABLET ORAL at 08:28

## 2025-04-14 RX ADMIN — HYDROCODONE BITARTRATE AND ACETAMINOPHEN 2 TABLET: 5; 325 TABLET ORAL at 11:31

## 2025-04-14 RX ADMIN — Medication 10 ML: at 08:28

## 2025-04-14 RX ADMIN — POTASSIUM CHLORIDE 40 MEQ: 1500 TABLET, EXTENDED RELEASE ORAL at 11:31

## 2025-04-14 RX ADMIN — POTASSIUM CHLORIDE 40 MEQ: 1500 TABLET, EXTENDED RELEASE ORAL at 08:28

## 2025-04-14 RX ADMIN — ARFORMOTEROL TARTRATE 15 MCG: 15 SOLUTION RESPIRATORY (INHALATION) at 07:25

## 2025-04-14 RX ADMIN — LEVOTHYROXINE SODIUM 88 MCG: 88 TABLET ORAL at 06:41

## 2025-04-14 RX ADMIN — DOXYCYCLINE 100 MG: 100 INJECTION, POWDER, LYOPHILIZED, FOR SOLUTION INTRAVENOUS at 23:53

## 2025-04-14 RX ADMIN — REVEFENACIN 175 MCG: 175 SOLUTION RESPIRATORY (INHALATION) at 07:25

## 2025-04-14 RX ADMIN — HYDROCODONE BITARTRATE AND ACETAMINOPHEN 2 TABLET: 5; 325 TABLET ORAL at 18:07

## 2025-04-14 RX ADMIN — DOXYCYCLINE 100 MG: 100 INJECTION, POWDER, LYOPHILIZED, FOR SOLUTION INTRAVENOUS at 11:32

## 2025-04-14 NOTE — THERAPY TREATMENT NOTE
"Patient Name: Patsy Frazier  : 1960    MRN: [id number]                              Today's Date: 2025       Admit Date: 4/10/2025    Visit Dx:     ICD-10-CM ICD-9-CM   1. Peripheral edema  R60.0 782.3   2. Wound of left lower extremity, initial encounter  S81.802A 894.0   3. Wound of right lower extremity, initial encounter  S81.801A 894.0   4. Bilateral cellulitis of lower leg  L03.116 682.6    L03.115      Patient Active Problem List   Diagnosis    Atrial flutter with rapid ventricular response    Tobacco abuse    SVT (supraventricular tachycardia)    Atrial flutter    Acute hypoxic respiratory failure    Hypoxia    COPD (chronic obstructive pulmonary disease)    Bilateral lower extremity edema    Weakness    Lower extremity cellulitis    Obesity, Class III, BMI 40-49.9 (morbid obesity)     Past Medical History:   Diagnosis Date    Arthritis     Asthma     CHF (congestive heart failure)     COPD (chronic obstructive pulmonary disease)     Hypothyroidism     Stroke     \"mini stroke\" at the age of 16, no deficits per pt report     SVT (supraventricular tachycardia)     prior hx of     Wears glasses      Past Surgical History:   Procedure Laterality Date    CARDIAC ELECTROPHYSIOLOGY PROCEDURE N/A 11/3/2017    Procedure: Ablation atrial flutter vs SVT;  Surgeon: Chavo Carr MD;  Location: [facility] INVASIVE LOCATION;  Service:     CHOLECYSTECTOMY      NECK SURGERY      TUBAL ABDOMINAL LIGATION        General Information       Row Name 25 1459          Physical Therapy Time and Intention    Document Type therapy note (daily note)  -MS     Mode of Treatment physical therapy  -MS       Row Name 25 1459          General Information    Patient Profile Reviewed yes  -MS     Prior Level of Function independent:;community mobility  -MS     Existing Precautions/Restrictions fall  -MS               User Key  (r) = Recorded By, (t) = Taken By, (c) = Cosigned By      Initials " Name Provider Type    Arjun Marsh, XENIA Physical Therapist                   Mobility       Row Name 04/14/25 1500          Bed Mobility    Bed Mobility supine-sit  -MS     Supine-Sit [place] (Bed Mobility) minimum assist (75% patient effort)  -MS       Row Name 04/14/25 1500          Sit-Stand Transfer    Sit-Stand [place] (Transfers) minimum assist (75% patient effort);2 person assist  -MS     Assistive Device (Sit-Stand Transfers) walker, front-wheeled  -MS       Row Name 04/14/25 1500          Gait/Stairs (Locomotion)    [place] Level (Gait) minimum assist (75% patient effort)  -MS     Assistive Device (Gait) walker, front-wheeled  -MS     Patient was able to Ambulate yes  -MS     Distance in Feet (Gait) 3  -MS     Bilateral Gait Deviations forward flexed posture;weight shift ability decreased;heel strike decreased  -MS               User Key  (r) = Recorded By, (t) = Taken By, (c) = Cosigned By      Initials Name Provider Type    Arjun Marsh PT Physical Therapist                   Obj/Interventions       Row Name 04/14/25 1504          Motor Skills    Therapeutic Exercise other (see comments)  too much pain in B LE  -MS               User Key  (r) = Recorded By, (t) = Taken By, (c) = Cosigned By      Initials Name Provider Type    Arjun Marsh, XENIA Physical Therapist                   Goals/Plan    No documentation.                  Clinical Impression       Row Name 04/14/25 1504          Pain    Pretreatment Pain Rating 2/10  -MS     Posttreatment Pain Rating 5/10  -MS     Pain Location extremity  -MS     Pain Side/Orientation right;lower  -MS       Row Name 04/14/25 1504          Plan of Care Review    Plan of Care Reviewed With patient  -MS     Progress no change  -MS     Outcome Evaluation Pt was seen for PT tx today. Pt very eager to get out of bed. Pt was Marjorie to get OOB. Pt was Marojrie x2 to stand with the bed elevated. Pt took steps 3ft over to the chair with Rwx and  Marjorie with increased time and cues. Pt reported increased pain with activity but improved once up in the chair.  -MS       Row Name 04/14/25 1504          Vital Signs    O2 Delivery Pre Treatment supplemental O2  -MS     O2 Delivery Intra Treatment supplemental O2  -MS     O2 Delivery Post Treatment supplemental O2  -MS     Pre Patient Position Supine  -MS     Intra Patient Position Standing  -MS     Post Patient Position Sitting  -MS       Row Name 04/14/25 1504          Positioning and Restraints    Pre-Treatment Position in bed  -MS     Post Treatment Position chair  -MS     In Chair reclined;call light within reach;encouraged to call for assist  -MS               User Key  (r) = Recorded By, (t) = Taken By, (c) = Cosigned By      Initials Name Provider Type    Arjun Marsh PT Physical Therapist                   Outcome Measures       Row Name 04/14/25 1519 04/14/25 0828       How much help from another person do you currently need...    Turning from your back to your side while in flat bed without using bedrails? 3  -MS 3  -BW    Moving from lying on back to sitting on the side of a flat bed without bedrails? 3  -MS 3  -BW    Moving to and from a bed to a chair (including a wheelchair)? 3  -MS 2  -BW    Standing up from a chair using your arms (e.g., wheelchair, bedside chair)? 3  -MS 2  -BW    Climbing 3-5 steps with a railing? 2  -MS 1  -BW    To walk in hospital room? 2  -MS 2  -BW    AM-PAC 6 Clicks Score (PT) 16  -MS 13  -BW    Highest Level of Mobility Goal 5 --> Static standing  -MS 4 --> Transfer to chair/commode  -BW              User Key  (r) = Recorded By, (t) = Taken By, (c) = Cosigned By      Initials Name Provider Type    Arjun Marsh, PT Physical Therapist    Grecia Champion RN Registered Nurse                                 Physical Therapy Education       Title: PT OT SLP Therapies (In Progress)       Topic: Physical Therapy (In Progress)       Point: Mobility training  (Done)       Learning Progress Summary            Patient Acceptance, E, VU by MS at 4/14/2025 1519    Comment: importance of mobility    Acceptance, E,D, DU,VU by  at 4/11/2025 1331    Comment: educated on purpose of PT and importance of mobility                      Point: Home exercise program (Done)       Learning Progress Summary            Patient Acceptance, E, VU by MS at 4/14/2025 1519    Comment: importance of mobility                      Point: Body mechanics (Done)       Learning Progress Summary            Patient Acceptance, E,D, DU,VU by  at 4/11/2025 1331    Comment: educated on purpose of PT and importance of mobility                      Point: Precautions (Not Started)       Learner Progress:  Not documented in this visit.                              User Key       Initials Effective Dates Name Provider Type Discipline    MS 08/22/23 -  Arjun Davies, XENIA Physical Therapist PT     06/13/23 -  Fabio Denton PT Physical Therapist PT                  PT Recommendation and Plan     Progress: no change  Outcome Evaluation: Pt was seen for PT tx today. Pt very eager to get out of bed. Pt was Marjorie to get OOB. Pt was Marjorie x2 to stand with the bed elevated. Pt took steps 3ft over to the chair with Rwx and Marjorie with increased time and cues. Pt reported increased pain with activity but improved once up in the chair.     Time Calculation:         PT Charges       Row Name 04/14/25 1520             Time Calculation    Start Time 1430  -MS      Stop Time 1445  -MS      Time Calculation (min) 15 min  -MS      PT Received On 04/14/25  -MS         Timed Charges    69506 - PT Therapeutic Activity Minutes 15  -MS         Total Minutes    Timed Charges Total Minutes 15  -MS       Total Minutes 15  -MS                User Key  (r) = Recorded By, (t) = Taken By, (c) = Cosigned By      Initials Name Provider Type    MS Arjun Davies, PT Physical Therapist                  Therapy Charges for Today        Code Description Service Date Service Provider Modifiers Qty    60695191231  PT THERAPEUTIC ACT EA 15 MIN 4/14/2025 Arjun Davies, PT GP 1            PT G-Codes  Outcome Measure Options: AM-PAC 6 Clicks Basic Mobility (PT)  AM-PAC 6 Clicks Score (PT): 16  AM-PAC 6 Clicks Score (OT): 15       Arjun Davies PT  4/14/2025

## 2025-04-14 NOTE — THERAPY TREATMENT NOTE
"Patient Name: Patsy Frazier  : 1960    MRN: [id number]                              Today's Date: 2025       Admit Date: 4/10/2025    Visit Dx:     ICD-10-CM ICD-9-CM   1. Peripheral edema  R60.0 782.3   2. Wound of left lower extremity, initial encounter  S81.802A 894.0   3. Wound of right lower extremity, initial encounter  S81.801A 894.0   4. Bilateral cellulitis of lower leg  L03.116 682.6    L03.115      Patient Active Problem List   Diagnosis    Atrial flutter with rapid ventricular response    Tobacco abuse    SVT (supraventricular tachycardia)    Atrial flutter    Acute hypoxic respiratory failure    Hypoxia    COPD (chronic obstructive pulmonary disease)    Bilateral lower extremity edema    Weakness    Lower extremity cellulitis    Obesity, Class III, BMI 40-49.9 (morbid obesity)     Past Medical History:   Diagnosis Date    Arthritis     Asthma     CHF (congestive heart failure)     COPD (chronic obstructive pulmonary disease)     Hypothyroidism     Stroke     \"mini stroke\" at the age of 16, no deficits per pt report     SVT (supraventricular tachycardia)     prior hx of     Wears glasses      Past Surgical History:   Procedure Laterality Date    CARDIAC ELECTROPHYSIOLOGY PROCEDURE N/A 11/3/2017    Procedure: Ablation atrial flutter vs SVT;  Surgeon: Chavo Carr MD;  Location: [facility] INVASIVE LOCATION;  Service:     CHOLECYSTECTOMY      NECK SURGERY      TUBAL ABDOMINAL LIGATION        General Information       Row Name 25 1543          OT Time and Intention    Document Type therapy note (daily note)  -     Mode of Treatment occupational therapy  -     Patient Effort good  -       Row Name 25 1543          General Information    Patient Profile Reviewed yes  -               User Key  (r) = Recorded By, (t) = Taken By, (c) = Cosigned By      Initials Name Provider Type     Jazmin Vergara Occupational Therapist                     " Mobility/ADL's       Row Name 04/14/25 1543          Bed Mobility    Bed Mobility supine-sit  -     Supine-Sit [place] (Bed Mobility) minimum assist (75% patient effort)  -     Assistive Device (Bed Mobility) head of bed elevated;bed rails  -AH       Row Name 04/14/25 1543          Sit-Stand Transfer    Sit-Stand [place] (Transfers) minimum assist (75% patient effort);2 person assist  -     Assistive Device (Sit-Stand Transfers) walker, front-wheeled  -AH       Row Name 04/14/25 1543          Functional Mobility    Functional Mobility- Ind. Level minimum assist (75% patient effort)  -     Functional Mobility- Device walker, front-wheeled  -     Functional Mobility-Distance (Feet) 3  -     Functional Mobility- Comment pt with increased pain in B legs with mobility tasks  -AH       Row Name 04/14/25 1543          Bathing Assessment/Intervention    [place] Level (Bathing) moderate assist (50% patient effort)  -AH       Row Name 04/14/25 1543          Lower Body Dressing Assessment/Training    [place] Level (Lower Body Dressing) maximum assist (25% patient effort);dependent (less than 25% patient effort);don;pants/bottoms;socks  -AH       Row Name 04/14/25 1543          Toileting Assessment/Training    Comment, (Toileting) continuing to use purewick  -               User Key  (r) = Recorded By, (t) = Taken By, (c) = Cosigned By      Initials Name Provider Type     Jazmin Vergara Occupational Therapist                   Obj/Interventions    No documentation.                  Goals/Plan    No documentation.                  Clinical Impression       Row Name 04/14/25 1545          Pain Assessment    Pretreatment Pain Rating 2/10  -     Posttreatment Pain Rating 5/10  -     Pain Location extremity  -     Pain Management Interventions exercise or physical activity utilized  -     Response to Pain Interventions activity participation with increased pain  -     Pre/Posttreatment  Pain Comment pt reports pain with movement is 10/10+ in her R leg  -       Row Name 04/14/25 1545          Plan of Care Review    Plan of Care Reviewed With patient  -     Progress no change  -     Outcome Evaluation Pt received supine in bed, willing to work with therapy as she wanted to get oob.  pt sat eob with min assist.  Max/dependent for donning brief and socks.  Pt was able to lift her leg to assist OT in donning LB items. Pt stood with min assist of 2, bed height elevated to assist in standing.  She used the walker with min assist to walk 3' to her chair, increased time needed d/t pain.  Pt was left sitting reclined in her chair with call light within reach and fress ice water was provided for her.  Pt is motivated to get stronger and be able to take care of herself.  Cont OT per POC.  -[facility] Name 04/14/25 1546          Therapy Plan Review/Discharge Plan (OT)    Anticipated Discharge Disposition (OT) sub acute care setting  -[facility] Name 04/14/25 1545          Positioning and Restraints    Pre-Treatment Position in bed  -     Post Treatment Position chair  -     In Chair reclined;call light within reach;encouraged to call for assist  -               User Key  (r) = Recorded By, (t) = Taken By, (c) = Cosigned By      Initials Name Provider Type    Jazmin Hager Occupational Therapist                   Outcome Measures       Row Name 04/14/25 1548          How much help from another is currently needed...    Putting on and taking off regular lower body clothing? 2  -AH     Bathing (including washing, rinsing, and drying) 2  -AH     Toileting (which includes using toilet bed pan or urinal) 2  -AH     Putting on and taking off regular upper body clothing 3  -AH     Taking care of personal grooming (such as brushing teeth) 3  -AH     Eating meals 3  -     AM-PAC 6 Clicks Score (OT) 15  -       Row Name 04/14/25 1519 04/14/25 0812       How much help from another person do you  currently need...    Turning from your back to your side while in flat bed without using bedrails? 3  -MS 3  -BW    Moving from lying on back to sitting on the side of a flat bed without bedrails? 3  -MS 3  -BW    Moving to and from a bed to a chair (including a wheelchair)? 3  -MS 2  -BW    Standing up from a chair using your arms (e.g., wheelchair, bedside chair)? 3  -MS 2  -BW    Climbing 3-5 steps with a railing? 2  -MS 1  -BW    To walk in hospital room? 2  -MS 2  -BW    AM-PAC 6 Clicks Score (PT) 16  -MS 13  -BW    Highest Level of Mobility Goal 5 --> Static standing  -MS 4 --> Transfer to chair/commode  -BW              User Key  (r) = Recorded By, (t) = Taken By, (c) = Cosigned By      Initials Name Provider Type     Jazmin Vergara Occupational Therapist    Arjun Marsh, PT Physical Therapist    Grecia Champion RN Registered Nurse                    Occupational Therapy Education       Title: PT OT SLP Therapies (In Progress)       Topic: Occupational Therapy (In Progress)       Point: ADL training (Done)       Learning Progress Summary            Patient Acceptance, E,TB, VU by  at 4/14/2025 1549    Comment: benefit of activity to improve functional independence    Acceptance, E,TB, VU by  at 4/11/2025 1256    Comment: Role of OT/POC                                      User Key       Initials Effective Dates Name Provider Type Discipline     06/16/21 -  Jazmin Vergara Occupational Therapist OT                  OT Recommendation and Plan  Planned Therapy Interventions (OT): activity tolerance training, BADL retraining, patient/caregiver education/training, transfer/mobility retraining, strengthening exercise  Therapy Frequency (OT): 3 times/wk  Plan of Care Review  Plan of Care Reviewed With: patient  Progress: no change  Outcome Evaluation: Pt received supine in bed, willing to work with therapy as she wanted to get oob.  pt sat eob with min assist.  Max/dependent for donning brief and  socks.  Pt was able to lift her leg to assist OT in donning LB items. Pt stood with min assist of 2, bed height elevated to assist in standing.  She used the walker with min assist to walk 3' to her chair, increased time needed d/t pain.  Pt was left sitting reclined in her chair with call light within reach and fress ice water was provided for her.  Pt is motivated to get stronger and be able to take care of herself.  Cont OT per POC.     Time Calculation:   Evaluation Complexity (OT)  Review Occupational Profile/Medical/Therapy History Complexity: expanded/moderate complexity  Assessment, Occupational Performance/Identification of Deficit Complexity: 3-5 performance deficits  Clinical Decision Making Complexity (OT): detailed assessment/moderate complexity  Overall Complexity of Evaluation (OT): moderate complexity     Time Calculation- OT       Row Name 04/14/25 1549             Time Calculation- OT    OT Start Time 1430  -      OT Stop Time 1448  -      OT Time Calculation (min) 18 min  -      OT Received On 04/14/25  -      OT Goal Re-Cert Due Date 04/21/25  -         Timed Charges    57130 - OT Therapeutic Activity Minutes 10  -      92108 - OT Self Care/Mgmt Minutes 8  -         Total Minutes    Timed Charges Total Minutes 18  -       Total Minutes 18  -AH                User Key  (r) = Recorded By, (t) = Taken By, (c) = Cosigned By      Initials Name Provider Type    Jazmin Hager Occupational Therapist                  Therapy Charges for Today       Code Description Service Date Service Provider Modifiers Qty    39011180914  OT THERAPEUTIC ACT EA 15 MIN 4/14/2025 Jazmin Vergara GO 1                 Jazmin Vergara  4/14/2025

## 2025-04-14 NOTE — PLAN OF CARE
Goal Outcome Evaluation:  Plan of Care Reviewed With: patient        Progress: no change  Outcome Evaluation: Pt received supine in bed, willing to work with therapy as she wanted to get oob.  pt sat eob with min assist.  Max/dependent for donning brief and socks.  Pt was able to lift her leg to assist OT in donning LB items. Pt stood with min assist of 2, bed height elevated to assist in standing.  She used the walker with min assist to walk 3' to her chair, increased time needed d/t pain.  Pt was left sitting reclined in her chair with call light within reach and fress ice water was provided for her.  Pt is motivated to get stronger and be able to take care of herself.  Cont OT per POC.    Anticipated Discharge Disposition (OT): sub acute care setting

## 2025-04-14 NOTE — CASE MANAGEMENT/SOCIAL WORK
Met with pt to let her know [facility] both offered [facility] . She would like to discuss with her daughter.    12:15 Pt chose [facility]. Will update JEANETTE Mei.

## 2025-04-14 NOTE — PLAN OF CARE
Problem: Adult Inpatient Plan of Care  Goal: Plan of Care Review  Outcome: Progressing  Flowsheets (Taken 4/14/2025 1728)  Progress: improving  Plan of Care Reviewed With: patient  Goal: Patient-Specific Goal (Individualized)  Outcome: Progressing  Goal: Absence of Hospital-Acquired Illness or Injury  Outcome: Progressing  Intervention: Identify and Manage Fall Risk  Recent Flowsheet Documentation  Taken 4/14/2025 1600 by Grecia Olivier RN  Safety Promotion/Fall Prevention: safety round/check completed  Taken 4/14/2025 1400 by Grecia Olivier RN  Safety Promotion/Fall Prevention:   safety round/check completed   activity supervised   assistive device/personal items within reach   clutter free environment maintained   fall prevention program maintained   nonskid shoes/slippers when out of bed  Taken 4/14/2025 1200 by Grecia Olivier RN  Safety Promotion/Fall Prevention:   safety round/check completed   activity supervised   assistive device/personal items within reach   clutter free environment maintained   fall prevention program maintained   nonskid shoes/slippers when out of bed  Taken 4/14/2025 1000 by Grecia Olivier RN  Safety Promotion/Fall Prevention: safety round/check completed  Taken 4/14/2025 0828 by Grecia Olivier RN  Safety Promotion/Fall Prevention:   activity supervised   assistive device/personal items within reach   clutter free environment maintained   fall prevention program maintained   safety round/check completed  Intervention: Prevent Skin Injury  Recent Flowsheet Documentation  Taken 4/14/2025 1600 by Grecia Olivier RN  Body Position: legs elevated  Taken 4/14/2025 1400 by Grecia Olivier RN  Body Position:   sitting up in bed   patient/family refused  Taken 4/14/2025 1200 by Grecia Olivier RN  Body Position:   patient/family refused   sitting up in bed  Taken 4/14/2025 1000 by Grecia Olivier RN  Body Position:   patient/family refused   sitting up in  bed  Taken 4/14/2025 0828 by Grecia Olivier RN  Body Position:   sitting up in bed   patient/family refused  Intervention: Prevent Infection  Recent Flowsheet Documentation  Taken 4/14/2025 1600 by Grecia Olivier RN  Infection Prevention: environmental surveillance performed  Taken 4/14/2025 1400 by Grecia Olivier RN  Infection Prevention:   environmental surveillance performed   hand hygiene promoted   rest/sleep promoted  Taken 4/14/2025 1200 by Grecia Olivier RN  Infection Prevention:   environmental surveillance performed   hand hygiene promoted   rest/sleep promoted  Taken 4/14/2025 1000 by Grecia Olivier RN  Infection Prevention:   environmental surveillance performed   rest/sleep promoted  Taken 4/14/2025 0828 by Grecia Olivier RN  Infection Prevention:   environmental surveillance performed   rest/sleep promoted  Goal: Optimal Comfort and Wellbeing  Outcome: Progressing  Intervention: Provide Person-Centered Care  Recent Flowsheet Documentation  Taken 4/14/2025 0828 by Gercia Olivier RN  Trust Relationship/Rapport:   care explained   choices provided   thoughts/feelings acknowledged  Goal: Readiness for Transition of Care  Outcome: Progressing   Goal Outcome Evaluation:  Plan of Care Reviewed With: patient        Progress: improving  Outcome Evaluation: VSS on 2LNC, no acute events noted today. PRN pain  medication given for pain, see MAR. Wound care completed. Plan of care continued.

## 2025-04-14 NOTE — CASE MANAGEMENT/SOCIAL WORK
Case Management/Social Work    Patient Name:  Patsy Frazier  YOB: 1960  MRN: [id number]  Admit Date:  4/10/2025        JEANETTE left messages for [facility] and [facility] and Sonam/Kirk BOONE and [facility]bi regarding referral for STR sent on Friday. SW following.     10:05 EDT [facility] offered bed. [facility] and [facility] also offered if pt can bring her own anoro inhaler. JEANETTE updated CM. JEANETTE following.     12:25 EDT Pt wants [facility]. JEANETTE to start auth. SW updated facility. SW following.     16:07 EDT SW submitted auth. Auth currently pending. JEANETTE following.       Electronically signed by:  NANCY Cazares  04/14/25 09:14 EDT

## 2025-04-14 NOTE — PLAN OF CARE
Goal Outcome Evaluation:  Plan of Care Reviewed With: patient        Progress: no change  Outcome Evaluation: Pt was seen for PT tx today. Pt very eager to get out of bed. Pt was Marjorie to get OOB. Pt was Marjorie x2 to stand with the bed elevated. Pt took steps 3ft over to the chair with Rwx and Marjorie with increased time and cues. Pt reported increased pain with activity but improved once up in the chair.

## 2025-04-14 NOTE — DISCHARGE SUMMARY
[facility] HOSPITALIST   DISCHARGE SUMMARY      Name:  Patsy Frazier   Age:  64 y.o.  Sex:  female  :  1960  MRN:  [id number]   Visit Number:  [id number]    Admission Date:  4/10/2025  Date of Discharge:  4/15/2025  Primary Care Physician:  Eva Washburn APRN    Important issues to note:    Patient was admitted with progressive generalized weakness, inability to care for self and bilateral lower extremity edema and cellulitis.  Patient was treated with Rocephin and doxycycline for bilateral lower extremity cellulitis.  She was also noted to have right knee joint osteoarthritis and was treated symptomatically.  Continue Keflex and doxycycline for 3 more days.  Due to generalized weakness and deconditioning, she is currently being discharged to short-term rehab facility.  Due to her history of atrial fibrillation and no cardiology follow-up, we will schedule her to follow-up with [facility] cardiology.  She was not on statin therapy at home and that will be prescribed at discharge.    Discharge Diagnoses:     Bilateral lower extremity cellulitis, POA.  Chronic bilateral lower venous insufficiency and lymphedema.  History of atrial flutter status post ablation on rivaroxaban.  Morbid obesity with a BMI of 47.  Right knee joint osteoarthritis.  Acquired hypothyroidism.  COPD, no exacerbation.  Impaired mobility and ADLs.  No evidence of CHF.    Problem List:     Active Hospital Problems    Diagnosis  POA    **Weakness [R53.1]  Yes    Lower extremity cellulitis [L03.119]  Yes     Priority: High    Obesity, Class III, BMI 40-49.9 (morbid obesity) [E66.01]  Yes      Resolved Hospital Problems   No resolved problems to display.     Presenting Problem:    Chief Complaint   Patient presents with    Leg Pain    Leg Swelling      Consults:     Consulting Physician(s)                     None              Procedures Performed:    None.    History of presenting illness/Hospital  Course:    Patsy Frazier is a 64-year-old female, history of congestive heart failure, COPD, stroke, SVT and atrial flutter status post ablation on rivaroxaban, presented for evaluation of bilateral lower extremity pain and swelling.  This has been ongoing for multiple months patient reports that the pain and swelling has increased over the last couple of days more than it had been, decreased mobility because of this, she reports that she has been using pads wrapped around her leg to help with the weeping edema. Intermittent oxygen use at 3 L as needed, denies fevers.  Reports that this all started when she had wounds that were previously healed but accidentally nicked one of her legs and created an abrasion .  History of leg edema since age 18.     In the emergency room, she was afebrile and hemodynamically stable saturating at 97% on 3 L of nasal cannula oxygen.  Serial troponin and proBNP were within normal range.  CMP was unremarkable except for glucose of 137.  Lactic acid, procalcitonin and CBC were unremarkable except for hemoglobin of 11.  Chest x-ray was unremarkable.  Venous duplex scan of lower extremities was negative for DVT.  Patient was noted to have bilateral lower extremity cellulitis and was initiated on antibody therapy with doxycycline and ceftriaxone.  Due to significant generalized weakness and inability to care for self, she was admitted to the medical floor for evaluation by case management for discharge planning.     Patient was continued on IV ceftriaxone and oral doxycycline.  She was seen by wound care services for bilateral lower extremity cellulitis.  She was continued on daily dressing changes.  She was able to work with physical therapy at the bedside.  Case management was consulted for short-term rehab placement and the patient is currently being discharged to short-term rehabilitation for continued physical therapy.  She will be discharged on 3 more days of Keflex and  doxycycline.    Bilateral lower extremity cellulitis/lymphedema.  - Continue ceftriaxone and doxycycline.  - Patient was seen by wound care services and their recommendations appreciated.  - Obtain nasal swab for MRSA screen.  - Keep extremities elevated.  - Bilateral lower extremity venous duplex was negative for DVT or Baker's cyst.  - Wound cultures grew Corynebacterium and gram-negative bacilli.     History of atrial flutter status post ablation.  - Continue rivaroxaban, metoprolol.     COPD/hypothyroidism.  - Continue home medications.     Right knee joint osteoarthritis.  - Continue Lortab for pain control.  - Continue physical therapy.    Vital Signs:    Temp:  [97.8 °F (36.6 °C)-98.4 °F (36.9 °C)] 97.8 °F (36.6 °C)  Heart Rate:  [76-84] 76  Resp:  [18] 18  BP: (107-118)/(48-64) 107/48    Physical Exam:    General Appearance:  Alert and cooperative.    Head:  Atraumatic and normocephalic.   Eyes: Conjunctivae and sclerae normal, no icterus. No pallor.   Ears:  Ears with no abnormalities noted.   Throat: No oral lesions, no thrush, oral mucosa moist.   Neck: Supple, trachea midline, no thyromegaly.   Back:   No kyphoscoliosis present. No tenderness to palpation.   Lungs:   Breath sounds heard bilaterally equally.  No crackles or wheezing. No Pleural rub or bronchial breathing.   Heart:  Normal S1 and S2, no murmur, no gallop, no rub. No JVD.   Abdomen:   Normal bowel sounds, no masses, no organomegaly. Soft, nontender, obese with a large pannus, no rebound tenderness.   Extremities: Supple, no cyanosis, no clubbing. 3-4+ pitting edema noted bilateral lower extremities up to the knees. Increased erythema noted bilateral lower legs with excoriations and eczema. Please see nursing note and photographs in chart for further details. No significant right knee joint tenderness, swelling or redness noted.    Pulses: Pulses palpable bilaterally.   Skin: No bleeding or rash.   Neurologic: Alert and oriented x 3. No  facial asymmetry. Moves all four limbs but does have generalized weakness. No tremors.     Pertinent Lab Results:     Results from last 7 days   Lab Units 04/14/25  1619 04/14/25  0510 04/12/25  0544 04/11/25  0547 04/10/25  1842 04/10/25  1217   SODIUM mmol/L  --  138 138 135*   < > 141   POTASSIUM mmol/L 4.1 3.6 3.9 4.5   < > 4.4   CHLORIDE mmol/L  --  95* 97* 98   < > 99   CO2 mmol/L  --  35.6* 33.3* 30.8*   < > 33.7*   BUN mg/dL  --  16 21 25*   < > 26*   CREATININE mg/dL  --  0.54* 0.61 0.57   < > 0.63   CALCIUM mg/dL  --  8.5* 8.7 8.7   < > 9.4   BILIRUBIN mg/dL  --   --   --   --   --  0.2   ALK PHOS U/L  --   --   --   --   --  68   ALT (SGPT) U/L  --   --   --   --   --  11   AST (SGOT) U/L  --   --   --   --   --  12   GLUCOSE mg/dL  --  103* 100* 107*   < > 137*    < > = values in this interval not displayed.     Results from last 7 days   Lab Units 04/14/25  0510 04/11/25  0547 04/10/25  1842   WBC 10*3/mm3 7.46 5.90 8.41   HEMOGLOBIN g/dL 11.0* 10.3* 11.7*   HEMATOCRIT % 34.8 33.2* 37.3   PLATELETS 10*3/mm3 270 283 295         Results from last 7 days   Lab Units 04/10/25  1318 04/10/25  1217   HSTROP T ng/L 8 7     Results from last 7 days   Lab Units 04/10/25  1217   PROBNP pg/mL 163.0       Results from last 7 days   Lab Units 04/12/25  1238   WOUNDCX  Heavy growth (4+) Corynebacterium species*  Light growth (2+) Gram Negative Bacilli*  Scant growth (1+) Streptococcus agalactiae (Group B)*     Pertinent Radiology Results:    Imaging Results (All)       Procedure Component Value Units Date/Time    US Venous Doppler Lower Extremity Bilateral (duplex) [[id number]] Collected: 04/10/25 1428     Updated: 04/10/25 1431    Narrative:      BILATERAL LOWER EXTREMITY VENOUS DUPLEX DOPPLER EXAMINATION     HISTORY: Bilateral lower extremity swelling and pain, eval DVT     COMPARISON:   None     PROCEDURE: Multiple transverse and longitudinal scans were performed of  both femoropopliteal deep venous system, with  augmentation and  compression maneuvers.     FINDINGS: Proper phasic flow was noted in the visualized bilateral lower  extremity deep venous system. No intraluminal increased echogenicity is  noted to suggest thrombus. There is proper compression and augmentation  of the venous structures. No abnormal venous collaterals are seen.  Subcutaneous edema noted in both lower extremities.       Impression:      No evidence of left or right lower extremity deep venous  thrombosis.        This report was signed and finalized on 4/10/2025 2:29 PM by Nelda Argueta MD.       XR Chest 1 View [[id number]] Collected: 04/10/25 1313     Updated: 04/10/25 1318    Narrative:      PROCEDURE: XR CHEST 1 VW-     HISTORY: eval edema, shortness of breath.     COMPARISON: May 10, 2024..     FINDINGS: The heart is upper limits of normal, stable.. There is  bilateral interstitial disease more prominent in the lung bases, stable  from prior.. The mediastinum is unremarkable. There is no pneumothorax.   There are no acute osseous abnormalities. Apical lordotic positioning  noted.        Impression:      Stable chest..     This report was signed and finalized on 4/10/2025 1:16 PM by Nelda Argueta MD.        Echo:    Results for orders placed during the hospital encounter of 05/10/24    Adult Transthoracic Echo Complete W/ Cont if Necessary Per Protocol    Interpretation Summary    Left ventricular systolic function is normal. Calculated left ventricular EF = 58.1% Left ventricular ejection fraction appears to be 56 - 60%. Left ventricular diastolic function was normal.    The right ventricular cavity is borderline dilated with normal systolic function.    The right atrial cavity is mildly  dilated.    Mild tricuspid valve regurgitation is present. Estimated right ventricular systolic pressure from tricuspid regurgitation is mildly elevated (35-45 mmHg).    There is a trivial pericardial effusion.    Mild dilation of the aortic root is present.  The aortic root measures 3.7 cm.    Condition on Discharge:      Stable.    Code status during the hospital stay:    Code Status and Medical Interventions: CPR (Attempt to Resuscitate); Full Support   Ordered at: 04/10/25 1817     Code Status (Patient has no pulse and is not breathing):    CPR (Attempt to Resuscitate)     Medical Interventions (Patient has pulse or is breathing):    Full Support     Level Of Support Discussed With:    Patient     Discharge Disposition:    Rehab Facility or Unit (DC - External)Short-term rehabilitation.    Discharge Medications:       Discharge Medications        New Medications        Instructions Start Date   atorvastatin 40 MG tablet  Commonly known as: Lipitor   40 mg, Oral, Nightly      cephalexin 500 MG capsule  Commonly known as: KEFLEX   500 mg, Oral, 3 Times Daily      doxycycline 100 MG capsule  Commonly known as: VIBRAMYCIN   100 mg, Oral, 2 Times Daily             Continue These Medications        Instructions Start Date   Anoro Ellipta 62.5-25 MCG/ACT aerosol powder  inhaler  Generic drug: Umeclidinium-Vilanterol   1 puff, Inhalation, Daily - RT      furosemide 80 MG tablet  Commonly known as: LASIX   80 mg, Oral, Daily      levothyroxine 200 MCG tablet  Commonly known as: SYNTHROID, LEVOTHROID   200 mcg, Every Morning      levothyroxine 88 MCG tablet  Commonly known as: SYNTHROID, LEVOTHROID   88 mcg, Daily      metoprolol tartrate 50 MG tablet  Commonly known as: LOPRESSOR   50 mg, 2 Times Daily      multivitamin with minerals tablet tablet   1 tablet, Oral, Daily      potassium chloride 20 MEQ CR tablet  Commonly known as: KLOR-CON M20   20 mEq, Oral, Daily      rivaroxaban 20 MG tablet  Commonly known as: XARELTO   20 mg, Oral, Daily With Dinner      Ventolin  (90 Base) MCG/ACT inhaler  Generic drug: albuterol sulfate HFA   2 puffs, Inhalation, Every 4 Hours PRN             Stop These Medications      cetirizine 10 MG tablet  Commonly known as: zyrTEC      fluticasone 50 MCG/ACT nasal spray  Commonly known as: FLONASE     potassium chloride 10 MEQ CR capsule  Commonly known as: MICRO-K            Discharge Diet:     Diet Instructions       Diet: Cardiac Diets; Healthy Heart (2-3 Na+); Regular (IDDSI 7); Thin (IDDSI 0)      Discharge Diet: Cardiac Diets    Cardiac Diet: Healthy Heart (2-3 Na+)    Texture: Regular (IDDSI 7)    Fluid Consistency: Thin (IDDSI 0)          Activity at Discharge:     Activity Instructions       Activity as Tolerated            Follow-up Appointments:    Additional Instructions for the Follow-ups that You Need to Schedule       Ambulatory Referral to Home Health   As directed      Face to Face Visit Date: 4/10/2025   Follow-up provider for Plan of Care?: I treated the patient in an acute care facility and will not continue treatment after discharge.   Follow-up provider: THIERRY LAND [[id number]]   Reason/Clinical Findings: lower extremity wounds   Describe mobility limitations that make leaving home difficult: lymphedema, wounds   Nursing/Therapeutic Services Requested: Medical / Social Work   Frequency: 1 Week 1               Contact information for follow-up providers       [facility] CARDIOLOGY .    Specialty: Cardiology  Contact information:  [address] [place] [address] [phone]  Additional information:  Phone Number: [phone]   Across from [facility] Emergency Dept             Dagoberto Leonard MD .    Specialties: Cardiology, Interventional Cardiology  Contact information:  [address] [place] [address] [phone]                       Contact information for after-discharge care       Destination       [facility] .    Service: Skilled Nursing  Contact information:  [address] [facility] [address] [phone]                                 Test Results Pending at Discharge:    None.       John Wills  MD  04/15/25  12:19 EDT    Time: I spent 25 minutes on this discharge activity which included: face-to-face encounter with the patient, reviewing the data in the system, coordination of the care with the nursing staff as well as consultants, documentation, and entering orders.     Dictated utilizing Dragon dictation.

## 2025-04-14 NOTE — DISCHARGE PLACEMENT REQUEST
"[place] documentation     Patsy Frzaier (64 y.o. Female)       Date of Birth   1960    Social Security Number       Address   [address] [place] [id number]    Home Phone   [phone]    MRN   [id number]       Rastafarian   [facility]    Marital Status                               Admission Date   4/10/2025    Admission Type   Emergency    Admitting Provider   John Wills MD    Attending Provider   John Wills MD    Department, Room/Bed   [facility] TELEMETRY 3, 320/1       Discharge Date       Discharge Disposition       Discharge Destination                                 Attending Provider: John Wills MD    Allergies: No Known Allergies    Isolation: None   Infection: None   Code Status: CPR    Ht: 172.7 cm (68\")   Wt: 138 kg (304 lb 3.8 oz)    Admission Cmt: None   Principal Problem: Weakness [R53.1]                   Active Insurance as of 4/10/2025       Primary Coverage       Payor Plan Insurance Group Employer/Plan Group    UNITED HEALTHCARE MEDICARE REPLACEMENT UHC MEDICARE ADVANTAGE [facility] HMO NON PAR [id number]       Payor Plan Address Payor Plan Phone Number Payor Plan Fax Number Effective Dates    [address] [address]   1/1/2024 - None Entered    [facility] [id number]         Subscriber Name Subscriber Birth Date Member ID       PATSY FRAZIER 1960 [id number]               Secondary Coverage       Payor Plan Insurance Group Employer/Plan Group    [employer] [place] WELL[place]ARE MEDICAID        Payor Plan Address Payor Plan Phone Number Payor Plan Fax Number Effective Dates    [address] [address] [phone]  9/28/2017 - None Entered    [facility] [id number]         Subscriber Name Subscriber Birth Date Member ID       PATSY FRAZIER 1960 [id number]                     Emergency Contacts        (Rel.) Home Phone Work Phone Mobile Phone    Fani Alegria (Daughter) [phone] -- --    DAVID ALEGRIA (Son) [phone] -- --                 History & Physical    "     Jimbo Love MD at 04/10/25 1949            [facility]   HISTORY AND PHYSICAL      Name:  Patsy Frazier   Age:  64 y.o.  Sex:  female  :  1960  MRN:  [id number]   Visit Number:  [id number]  Admission Date:  4/10/2025  Date Of Service:  04/10/25  Primary Care Physician:  Eva Washburn APRN    Chief Complaint:     Lower extremity edema, weakness    History Of Presenting Illness:      64-year-old female, history of congestive heart failure, COPD, stroke, SVT, presented for evaluation of bilateral lower extremity pain and swelling.  Has been ongoing for multiple months patient reports that the pain and swelling has increased over the last couple of days more than it had been, decreased mobility because of this, she reports that she has been using pads wrapped around her leg to help with the weeping edema.      She denies any traumatic injuries.  Appears to be anticoagulated with Xarelto.  Dopplerable and palpable pedal pulses  in ED.     Denies chest pain, denies shortness of breath.  Intermittent oxygen use at 3 L as needed, denies fevers.  Reports that this all started when she had wounds that were previously healed but accidentally nicked one of her legs and created an abrasion . Hx of leg edema since age 18. No other active complains     Review Of Systems:    All systems were reviewed and negative except as mentioned in history of presenting illness, assessment and plan.    Past Medical History: Patient  has a past medical history of Arthritis, Asthma, CHF (congestive heart failure), COPD (chronic obstructive pulmonary disease), Hypothyroidism, Stroke (), SVT (supraventricular tachycardia), and Wears glasses.    Past Surgical History: Patient  has a past surgical history that includes Tubal ligation (); Neck surgery (); Cholecystectomy (); and Cardiac electrophysiology procedure (N/A, 11/3/2017).    Social History: Patient  reports that she has  been smoking cigarettes. She has a 20 pack-year smoking history. She has never used smokeless tobacco. She reports that she does not drink alcohol and does not use drugs.    Family History:  per patient family history of bilat lower extrem edema     Allergies:      Patient has no known allergies.    Home Medications:    Prior to Admission Medications       Prescriptions Last Dose Informant Patient Reported? Taking?    albuterol sulfate  (90 Base) MCG/ACT inhaler   No No    Inhale 2 puffs Every 4 (Four) Hours As Needed for Wheezing.    cetirizine (zyrTEC) 10 MG tablet   Yes No    Take 1 tablet by mouth Daily.    Patient not taking:  Reported on 5/13/2024    fluticasone (FLONASE) 50 MCG/ACT nasal spray   Yes No    2 sprays into the nostril(s) as directed by provider Daily.    Patient not taking:  Reported on 5/13/2024    furosemide (LASIX) 80 MG tablet   No No    Take 1 tablet by mouth Daily.    levothyroxine (SYNTHROID, LEVOTHROID) 200 MCG tablet  Self Yes No    Take 1 tablet by mouth Every Morning.    levothyroxine (SYNTHROID, LEVOTHROID) 88 MCG tablet   Yes No    Take 1 tablet by mouth Daily. Take with the 200mg tablet for a total of 288mg per day    metoprolol tartrate (LOPRESSOR) 50 MG tablet   Yes No    Take 1 tablet by mouth 2 (Two) Times a Day.    Multiple Vitamins-Minerals (MULTIVITAMIN ADULT PO)  Self Yes No    Take 1 tablet by mouth Daily.    potassium chloride (KLOR-CON M20) 20 MEQ CR tablet   No No    Take 1 tablet by mouth Daily.    potassium chloride (MICRO-K) 10 MEQ CR capsule   Yes Yes    Take 1 capsule by mouth 2 (Two) Times a Day With Meals.    rivaroxaban (XARELTO) 20 MG tablet   No No    Take 1 tablet by mouth Daily With Dinner.    Umeclidinium-Vilanterol (Anoro Ellipta) 62.5-25 MCG/ACT aerosol powder  inhaler   No No    Inhale 1 puff Daily for 30 days.          ED Medications:    Medications   sodium chloride 0.9 % flush 10 mL (has no administration in time range)   sodium chloride 0.9 %  "flush 10 mL (has no administration in time range)   sodium chloride 0.9 % flush 10 mL (has no administration in time range)   sodium chloride 0.9 % infusion 40 mL (has no administration in time range)   ondansetron (ZOFRAN) injection 4 mg (has no administration in time range)   Pharmacy to Dose enoxaparin (LOVENOX) (has no administration in time range)   Potassium Replacement - Follow Nurse / BPA Driven Protocol (has no administration in time range)   Magnesium Standard Dose Replacement - Follow Nurse / BPA Driven Protocol (has no administration in time range)   Phosphorus Replacement - Follow Nurse / BPA Driven Protocol (has no administration in time range)   Calcium Replacement - Follow Nurse / BPA Driven Protocol (has no administration in time range)   enoxaparin sodium (LOVENOX) syringe 40 mg (40 mg Subcutaneous Given 4/10/25 1836)   morphine injection 4 mg (4 mg Intravenous Given 4/10/25 1235)   cephalexin (KEFLEX) capsule 500 mg (500 mg Oral Given 4/10/25 1509)   doxycycline (MONODOX) capsule 100 mg (100 mg Oral Given 4/10/25 1509)     Vital Signs:  Temp:  [98 °F (36.7 °C)] 98 °F (36.7 °C)  Heart Rate:  [] 120  Resp:  [20] 20  BP: (109-120)/(56-85) 120/58        04/10/25  1202   Weight: 136 kg (300 lb)     Body mass index is 45.61 kg/m².    Physical Exam:     Most recent vital Signs: /58   Pulse 120   Temp 98 °F (36.7 °C) (Axillary)   Resp 20   Ht 172.7 cm (68\")   Wt 136 kg (300 lb)   SpO2 91%   BMI 45.61 kg/m²     Physical Exam  General: NAD, resting comfortably in bed, obese, NC   HEENT: Normocephalic, atraumatic, PERRL. neck supple,   Cardiovascular: regularly irregular rhythm (PVCs). No murmurs, rubs, or gallops. Peripheral pulses 2+ bilaterally. No pedal edema,   Pulmonary: Lungs CTAB on ant fields No accessory muscle use, no resp. distress.   Abdomen: Soft, non-tender, non-distended. No guarding or rebound.   Extremities: No cyanosis, clubbing or edema. Warm and well-perfused. " "Capillary refill < 2 s . Extensive LE edema and erythema with scaling bilaterally, obese LE   Neurological: Alert and oriented to person, place, and time. No focal deficits. Cranial nerves II-XII grossly intact. Motor strength 5/5 in all extremities. Sensation grossly intact to light touch.   Psychiatric: Calm, cooperative. Mood and affect appropriate for the situation.    Laboratory data:    I have reviewed the labs done in the emergency room.    Results from last 7 days   Lab Units 04/10/25  1842 04/10/25  1217   SODIUM mmol/L 140 141   POTASSIUM mmol/L 4.7 4.4   CHLORIDE mmol/L 99 99   CO2 mmol/L 33.0* 33.7*   BUN mg/dL 26* 26*   CREATININE mg/dL 0.65 0.63   CALCIUM mg/dL 9.3 9.4   BILIRUBIN mg/dL  --  0.2   ALK PHOS U/L  --  68   ALT (SGPT) U/L  --  11   AST (SGOT) U/L  --  12   GLUCOSE mg/dL 112* 137*     Results from last 7 days   Lab Units 04/10/25  1842 04/10/25  1217   WBC 10*3/mm3 8.41 7.67   HEMOGLOBIN g/dL 11.7* 11.4*   HEMATOCRIT % 37.3 37.2   PLATELETS 10*3/mm3 295 311         Results from last 7 days   Lab Units 04/10/25  1318 04/10/25  1217   HSTROP T ng/L 8 7     Results from last 7 days   Lab Units 04/10/25  1217   PROBNP pg/mL 163.0                       Invalid input(s): \"USDES\", \"NITRITITE\", \"BACT\", \"EP\"    Pain Management Panel           No data to display                EKG:      NSR with PVCs     Radiology:    US Venous Doppler Lower Extremity Bilateral (duplex)  Result Date: 4/10/2025  BILATERAL LOWER EXTREMITY VENOUS DUPLEX DOPPLER EXAMINATION  HISTORY: Bilateral lower extremity swelling and pain, eval DVT  COMPARISON:   None  PROCEDURE: Multiple transverse and longitudinal scans were performed of both femoropopliteal deep venous system, with augmentation and compression maneuvers.  FINDINGS: Proper phasic flow was noted in the visualized bilateral lower extremity deep venous system. No intraluminal increased echogenicity is noted to suggest thrombus. There is proper compression and " augmentation of the venous structures. No abnormal venous collaterals are seen. Subcutaneous edema noted in both lower extremities.      No evidence of left or right lower extremity deep venous thrombosis.      This report was signed and finalized on 4/10/2025 2:29 PM by Nelda Argueta MD.      XR Chest 1 View  Result Date: 4/10/2025  PROCEDURE: XR CHEST 1 VW-  HISTORY: eval edema, shortness of breath.  COMPARISON: May 10, 2024..  FINDINGS: The heart is upper limits of normal, stable.. There is bilateral interstitial disease more prominent in the lung bases, stable from prior.. The mediastinum is unremarkable. There is no pneumothorax. There are no acute osseous abnormalities. Apical lordotic positioning noted.      Stable chest..     This report was signed and finalized on 4/10/2025 1:16 PM by Nelda Argueta MD.        Assessment:    64-year-old female, history of congestive heart failure, bilateral lower extremity edema chronic since age 18 (family history reported), COPD on O2, stroke, SVT, atrial flutter s/p ablation presented for evaluation of bilateral lower extremity pain and swelling ongoing for multiple months    Bilateral lower extremity lymphangitis  ?  Bilateral lower extremity superimposed cellulitis, mild  -No fevers no leukocytosis low procalcitonin  -Ultrasound  Doppler negative for DVT  Lower extremity swelling since 18 years old unclear etiology, positive family history  Obesity  PVCs  Weakness/deconditioning  History of heart failure        Plan:  -Initially patient was going to be discharged from ED however once EMS arrived patient refused to be discharged because she describes he felt too weak.  Patient's insurance will not take her for home health per chart review.  Initially instructed ED that there was no medical need for admission at the time that we discussed the case however patient was unable to ambulate and discharge was deemed unsafe.  Admitted for placement to rehab.  -Ceftriaxone  doxycycline  - Telemetry  - Continue home medications as appropriate  - PT OT case management consult    Risk Assessment: Moderate  DVT Prophylaxis: Lovenox  Code Status: Full          Jimbo Love MD  04/10/25  19:49 EDT    Dictated utilizing Dragon dictation.      Electronically signed by Jimbo Love MD at 04/10/25 2002       Current Facility-Administered Medications   Medication Dose Route Frequency Provider Last Rate Last Admin    albuterol (PROVENTIL) nebulizer solution 0.083% 2.5 mg/3mL  2.5 mg Nebulization Q6H PRN Jimbo Love MD        arformoterol (BROVANA) nebulizer solution 15 mcg  15 mcg Nebulization BID - RT Jimbo Love MD   15 mcg at 04/14/25 0725    And    revefenacin (YUPELRI) nebulizer solution 175 mcg  175 mcg Nebulization Daily - RT Jimbo Love MD   175 mcg at 04/14/25 0725    Calcium Replacement - Follow Nurse / BPA Driven Protocol   Not Applicable PRN Jimbo Love MD        cefTRIAXone (ROCEPHIN) 2,000 mg in sodium chloride 0.9 % 100 mL IVPB-VTB  2,000 mg Intravenous Q24H Jimbo Love MD   Stopped at 04/13/25 2100    doxycycline (VIBRAMYCIN) 100 mg in sodium chloride 0.9 % 100 mL IVPB-VTB  100 mg Intravenous Q12H Jimbo Love MD 0 mL/hr at 04/14/25 0100 100 mg at 04/14/25 1132    furosemide (LASIX) tablet 80 mg  80 mg Oral Daily Jimbo Love MD   80 mg at 04/14/25 0828    HYDROcodone-acetaminophen (NORCO) 5-325 MG per tablet 2 tablet  2 tablet Oral Q6H PRN John Wills MD   2 tablet at 04/14/25 1131    levothyroxine (SYNTHROID, LEVOTHROID) tablet 200 mcg  200 mcg Oral QAM Jimbo Love MD   200 mcg at 04/14/25 0641    levothyroxine (SYNTHROID, LEVOTHROID) tablet 88 mcg  88 mcg Oral Daily Jimbo Love MD   88 mcg at 04/14/25 0641    Magnesium Standard Dose Replacement - Follow Nurse / BPA Driven Protocol   Not Applicable PRN Jimbo Love MD        metoprolol tartrate (LOPRESSOR) tablet 50 mg  50 mg Oral BID Jimbo Love MD   50 mg at 04/12/25 2007    mineral  oil-hydrophilic petrolatum (AQUAPHOR) ointment 1 Application  1 Application Topical Q12H John Wills MD   1 Application at 25 0513    ondansetron (ZOFRAN) injection 4 mg  4 mg Intravenous Q6H PRN Jimbo Love MD        Phosphorus Replacement - Follow Nurse / BPA Driven Protocol   Not Applicable PRN Jimbo Love MD        Potassium Replacement - Follow Nurse / BPA Driven Protocol   Not Applicable PRN Jimbo Love MD        rivaroxaban (XARELTO) tablet 20 mg  20 mg Oral Daily With Dinner Jimbo Love MD   20 mg at 25 1709    sodium chloride 0.9 % flush 10 mL  10 mL Intravenous PRN Jimbo Love MD        sodium chloride 0.9 % flush 10 mL  10 mL Intravenous Q12H Jimbo Love MD   10 mL at 25 0828    sodium chloride 0.9 % flush 10 mL  10 mL Intravenous PRN Jimbo Love MD        sodium chloride 0.9 % infusion 40 mL  40 mL Intravenous PRN Jimbo Love MD            Physician Progress Notes (most recent note)        John Wills MD at 25 1121                [facility]    [facility]    Name:  Patsy Fraizer   Age:  64 y.o.  Sex:  female  :  1960  MRN:  [id number]   Visit Number:  [id number]  Admission Date:  4/10/2025  Date Of Service:  25  Primary Care Physician:  Eva Washburn APRN     LOS: 1 day :    Chief Complaint:      Leg swelling and right knee joint pain.    Subjective:    Patsy Frazier was seen and examined this morning.  She is lying down on the bed and is comfortable at rest.  Still has some pain in her right knee but it has improved.  No significant overnight events.    Hospital Course:    Patsy Frazier is a 64-year-old female, history of congestive heart failure, COPD, stroke, SVT and atrial flutter status post ablation on rivaroxaban, presented for evaluation of bilateral lower extremity pain and swelling.  This has been ongoing for multiple months patient reports that the pain and swelling has  increased over the last couple of days more than it had been, decreased mobility because of this, she reports that she has been using pads wrapped around her leg to help with the weeping edema. Intermittent oxygen use at 3 L as needed, denies fevers.  Reports that this all started when she had wounds that were previously healed but accidentally nicked one of her legs and created an abrasion .  History of leg edema since age 18.    In the emergency room, she was afebrile and hemodynamically stable saturating at 97% on 3 L of nasal cannula oxygen.  Serial troponin and proBNP were within normal range.  CMP was unremarkable except for glucose of 137.  Lactic acid, procalcitonin and CBC were unremarkable except for hemoglobin of 11.  Chest x-ray was unremarkable.  Venous duplex scan of lower extremities was negative for DVT.  Patient was noted to have bilateral lower extremity cellulitis and was initiated on antibody therapy with doxycycline and ceftriaxone.  Due to significant generalized weakness and inability to care for self, she was admitted to the medical floor for evaluation by case management for discharge planning.     Review of Systems:     All systems were reviewed and negative except as mentioned in subjective, assessment and plan.    Vital Signs:    Temp:  [98 °F (36.7 °C)-98.2 °F (36.8 °C)] 98 °F (36.7 °C)  Heart Rate:  [70-83] 79  Resp:  [18] 18  BP: (110-117)/(46-53) 110/46    Intake and output:    I/O last 3 completed shifts:  In: 600 [P.O.:600]  Out: 3200 [Urine:3200]  No intake/output data recorded.    Physical Examination:    General Appearance:  Alert and cooperative.  Obese.   Head:  Atraumatic and normocephalic.   Eyes: Conjunctivae and sclerae normal, no icterus. No pallor.   Throat: No oral lesions, no thrush, oral mucosa moist.   Neck: Supple, trachea midline, no thyromegaly.   Lungs:   Breath sounds heard bilaterally equally.  No wheezing or crackles. No Pleural rub or bronchial breathing.    Heart:  Normal S1 and S2, no murmur, no gallop, no rub. No JVD.   Abdomen:   Normal bowel sounds, no masses, no organomegaly. Soft, nontender, obese with a large pannus, no rebound tenderness.   Extremities: Supple, no cyanosis, no clubbing.  3-4+ pitting edema noted bilateral lower extremities up to the knees.  Increased erythema noted bilateral lower legs with excoriations and eczema.  Please see nursing note and photographs in chart for further details.  No significant right knee joint tenderness, swelling or redness noted.   Skin: No bleeding or rash.   Neurologic: Alert and oriented x 3. No facial asymmetry. Moves all four limbs but does have generalized weakness. No tremors.    Laboratory results:    Results from last 7 days   Lab Units 04/14/25  0510 04/12/25  0544 04/11/25  0547 04/10/25  1842 04/10/25  1217   SODIUM mmol/L 138 138 135*   < > 141   POTASSIUM mmol/L 3.6 3.9 4.5   < > 4.4   CHLORIDE mmol/L 95* 97* 98   < > 99   CO2 mmol/L 35.6* 33.3* 30.8*   < > 33.7*   BUN mg/dL 16 21 25*   < > 26*   CREATININE mg/dL 0.54* 0.61 0.57   < > 0.63   CALCIUM mg/dL 8.5* 8.7 8.7   < > 9.4   BILIRUBIN mg/dL  --   --   --   --  0.2   ALK PHOS U/L  --   --   --   --  68   ALT (SGPT) U/L  --   --   --   --  11   AST (SGOT) U/L  --   --   --   --  12   GLUCOSE mg/dL 103* 100* 107*   < > 137*    < > = values in this interval not displayed.     Results from last 7 days   Lab Units 04/14/25  0510 04/11/25  0547 04/10/25  1842   WBC 10*3/mm3 7.46 5.90 8.41   HEMOGLOBIN g/dL 11.0* 10.3* 11.7*   HEMATOCRIT % 34.8 33.2* 37.3   PLATELETS 10*3/mm3 270 283 295         Results from last 7 days   Lab Units 04/10/25  1318 04/10/25  1217   HSTROP T ng/L 8 7       I have reviewed the patient's laboratory results.    Radiology results:    No radiology results from the last 24 hrs    I have reviewed the patient's radiology reports.    Medication Review:     I have reviewed the patient's active and prn medications.     Problem List:       Weakness    Lower extremity cellulitis    Obesity, Class III, BMI 40-49.9 (morbid obesity)    Assessment:    Bilateral lower extremity cellulitis, POA.  Chronic bilateral lower venous insufficiency and lymphedema.  History of atrial flutter status post ablation on rivaroxaban.  Morbid obesity with a BMI of 47.  Right knee joint osteoarthritis.  Acquired hypothyroidism.  COPD, no exacerbation.  Impaired mobility and ADLs.  No evidence of CHF.    Plan:    Bilateral lower extremity cellulitis/lymphedema.  - Continue ceftriaxone and doxycycline.  - Patient was seen by wound care services and their recommendations appreciated.  - Obtain nasal swab for MRSA screen.  - Keep extremities elevated.  - Bilateral lower extremity venous duplex was negative for DVT or Baker's cyst.  - Obtain wound culture.    History of atrial flutter status post ablation.  - Continue rivaroxaban, metoprolol.    COPD/hypothyroidism.  - Continue home medications.    Right knee joint osteoarthritis.  - Continue Lortab for pain control.  - Continue physical therapy.    I have reviewed the copied text and it is accurate as of 25    DVT Prophylaxis: Rivaroxaban  Code Status: Full  Diet: Cardiac diabetic  Discharge Plan: Awaiting short-term rehab placement.    John Wills MD  25  11:21 EDT    Dictated utilizing Dragon dictation.      Electronically signed by John Wills MD at 25 1122          Physical Therapy Notes (most recent note)        Arjun Davies, PT at 25 1521  Version 1 of 1         Patient Name: Patsy Frazier  : 1960    MRN: [id number]                              Today's Date: 2025       Admit Date: 4/10/2025    Visit Dx:     ICD-10-CM ICD-9-CM   1. Peripheral edema  R60.0 782.3   2. Wound of left lower extremity, initial encounter  S81.802A 894.0   3. Wound of right lower extremity, initial encounter  S81.801A 894.0   4. Bilateral cellulitis of lower leg  L03.116 682.6    L03.115   "    Patient Active Problem List   Diagnosis    Atrial flutter with rapid ventricular response    Tobacco abuse    SVT (supraventricular tachycardia)    Atrial flutter    Acute hypoxic respiratory failure    Hypoxia    COPD (chronic obstructive pulmonary disease)    Bilateral lower extremity edema    Weakness    Lower extremity cellulitis    Obesity, Class III, BMI 40-49.9 (morbid obesity)     Past Medical History:   Diagnosis Date    Arthritis     Asthma     CHF (congestive heart failure)     COPD (chronic obstructive pulmonary disease)     Hypothyroidism     Stroke 1976    \"mini stroke\" at the age of 16, no deficits per pt report     SVT (supraventricular tachycardia)     prior hx of     Wears glasses      Past Surgical History:   Procedure Laterality Date    CARDIAC ELECTROPHYSIOLOGY PROCEDURE N/A 11/3/2017    Procedure: Ablation atrial flutter vs SVT;  Surgeon: Chavo Carr MD;  Location: [facility] INVASIVE LOC[facility];  Service:     CHOLECYSTECTOMY  2010    NECK SURGERY  2007    TUBAL ABDOMINAL LIGATION  1981      General Information       Row Name 04/14/25 1457          Physical Therapy Time and Intention    Document Type therapy note (daily note)  -MS     Mode of Treatment physical therapy  -MS       Row Name 04/14/25 1450          General Information    Patient Profile Reviewed yes  -MS     Prior Level of Function independent:;community mobility  -MS     Existing Precautions/Restrictions fall  -MS               User Key  (r) = Recorded By, (t) = Taken By, (c) = Cosigned By      Initials Name Provider Type    MS Arjun Davies, PT Physical Therapist                   Mobility       Row Name 04/14/25 1500          Bed Mobility    Bed Mobility supine-sit  -MS     Supine-Sit [place] (Bed Mobility) minimum assist (75% patient effort)  -MS       Row Name 04/14/25 1500          Sit-Stand Transfer    Sit-Stand [place] (Transfers) minimum assist (75% patient effort);2 person assist  -MS     Assistive " Device (Sit-Stand Transfers) walker, front-wheeled  -MS       Row Name 04/14/25 1500          Gait/Stairs (Locomotion)    [place] Level (Gait) minimum assist (75% patient effort)  -MS     Assistive Device (Gait) walker, front-wheeled  -MS     Patient was able to Ambulate yes  -MS     Distance in Feet (Gait) 3  -MS     Bilateral Gait Deviations forward flexed posture;weight shift ability decreased;heel strike decreased  -MS               User Key  (r) = Recorded By, (t) = Taken By, (c) = Cosigned By      Initials Name Provider Type    Arjun Marsh PT Physical Therapist                   Obj/Interventions       Row Name 04/14/25 1504          Motor Skills    Therapeutic Exercise other (see comments)  too much pain in B LE  -MS               User Key  (r) = Recorded By, (t) = Taken By, (c) = Cosigned By      Initials Name Provider Type    Arjun Marsh PT Physical Therapist                   Goals/Plan    No documentation.                  Clinical Impression       Row Name 04/14/25 1504          Pain    Pretreatment Pain Rating 2/10  -MS     Posttreatment Pain Rating 5/10  -MS     Pain Location extremity  -MS     Pain Side/Orientation right;lower  -MS       Row Name 04/14/25 1504          Plan of Care Review    Plan of Care Reviewed With patient  -MS     Progress no change  -MS     Outcome Evaluation Pt was seen for PT tx today. Pt very eager to get out of bed. Pt was Marjorie to get OOB. Pt was Marjorie x2 to stand with the bed elevated. Pt took steps 3ft over to the chair with Rwx and Marjorie with increased time and cues. Pt reported increased pain with activity but improved once up in the chair.  -MS       Row Name 04/14/25 1504          Vital Signs    O2 Delivery Pre Treatment supplemental O2  -MS     O2 Delivery Intra Treatment supplemental O2  -MS     O2 Delivery Post Treatment supplemental O2  -MS     Pre Patient Position Supine  -MS     Intra Patient Position Standing  -MS     Post Patient Position  Sitting  -MS       Row Name 04/14/25 1504          Positioning and Restraints    Pre-Treatment Position in bed  -MS     Post Treatment Position chair  -MS     In Chair reclined;call light within reach;encouraged to call for assist  -MS               User Key  (r) = Recorded By, (t) = Taken By, (c) = Cosigned By      Initials Name Provider Type    Arjun Marsh, PT Physical Therapist                   Outcome Measures       Row Name 04/14/25 1519 04/14/25 0828       How much help from another person do you currently need...    Turning from your back to your side while in flat bed without using bedrails? 3  -MS 3  -BW    Moving from lying on back to sitting on the side of a flat bed without bedrails? 3  -MS 3  -BW    Moving to and from a bed to a chair (including a wheelchair)? 3  -MS 2  -BW    Standing up from a chair using your arms (e.g., wheelchair, bedside chair)? 3  -MS 2  -BW    Climbing 3-5 steps with a railing? 2  -MS 1  -BW    To walk in hospital room? 2  -MS 2  -BW    AM-PAC 6 Clicks Score (PT) 16  -MS 13  -BW    Highest Level of Mobility Goal 5 --> Static standing  -MS 4 --> Transfer to chair/commode  -BW              User Key  (r) = Recorded By, (t) = Taken By, (c) = Cosigned By      Initials Name Provider Type    Arjun Marsh, PT Physical Therapist    Grecia Champion, RN Registered Nurse                                 Physical Therapy Education       Title: PT OT SLP Therapies (In Progress)       Topic: Physical Therapy (In Progress)       Point: Mobility training (Done)       Learning Progress Summary            Patient Acceptance, E, VU by MS at 4/14/2025 1519    Comment: importance of mobility    Acceptance, E,D, DU,VU by  at 4/11/2025 1331    Comment: educated on purpose of PT and importance of mobility                      Point: Home exercise program (Done)       Learning Progress Summary            Patient Acceptance, E, VU by MS at 4/14/2025 1519    Comment: importance of  mobility                      Point: Body mechanics (Done)       Learning Progress Summary            Patient Acceptance, E,D, DU,VU by  at 4/11/2025 1331    Comment: educated on purpose of PT and importance of mobility                      Point: Precautions (Not Started)       Learner Progress:  Not documented in this visit.                              User Key       Initials Effective Dates Name Provider Type Discipline    MS 08/22/23 -  Arjun Davies PT Physical Therapist PT     06/13/23 -  Fabio Denton PT Physical Therapist PT                  PT Recommendation and Plan     Progress: no change  Outcome Evaluation: Pt was seen for PT tx today. Pt very eager to get out of bed. Pt was Marjorie to get OOB. Pt was Marjorie x2 to stand with the bed elevated. Pt took steps 3ft over to the chair with Rwx and Marjorie with increased time and cues. Pt reported increased pain with activity but improved once up in the chair.     Time Calculation:         PT Charges       Row Name 04/14/25 1520             Time Calculation    Start Time 1430  -MS      Stop Time 1445  -MS      Time Calculation (min) 15 min  -MS      PT Received On 04/14/25  -MS         Timed Charges    16735 - PT Therapeutic Activity Minutes 15  -MS         Total Minutes    Timed Charges Total Minutes 15  -MS       Total Minutes 15  -MS                User Key  (r) = Recorded By, (t) = Taken By, (c) = Cosigned By      Initials Name Provider Type    MS Arjun Davies PT Physical Therapist                  Therapy Charges for Today       Code Description Service Date Service Provider Modifiers Qty    14709937084  PT THERAPEUTIC ACT EA 15 MIN 4/14/2025 Arjun Davies PT GP 1            PT G-Codes  Outcome Measure Options: AM-PAC 6 Clicks Basic Mobility (PT)  AM-PAC 6 Clicks Score (PT): 16  AM-PAC 6 Clicks Score (OT): 15       Arjun Davies PT  4/14/2025      Electronically signed by Arjun Davies PT at 04/14/25 1521     Fabio Denton, PT  "  Physical Therapist  Physical Therapy     Therapy Evaluation      Signed     Date of Service: 25  Creation Time: 25     Signed       Expand All Collapse All    Patient Name: Patsy Frazier                       : 1960                      MRN: [id number]                              Today's Date: 2025                                   Admit Date: 4/10/2025                        Visit Dx:   Visit Diagnosis       ICD-10-CM ICD-9-CM   1. Peripheral edema  R60.0 782.3   2. Wound of left lower extremity, initial encounter  S81.802A 894.0   3. Wound of right lower extremity, initial encounter  S81.801A 894.0   4. Bilateral cellulitis of lower leg  L03.116 682.6     L03.115           Problem List       Patient Active Problem List   Diagnosis    Atrial flutter with rapid ventricular response    Tobacco abuse    SVT (supraventricular tachycardia)    Atrial flutter    Acute hypoxic respiratory failure    Hypoxia    COPD (chronic obstructive pulmonary disease)    Bilateral lower extremity edema    Weakness         Medical History        Past Medical History:   Diagnosis Date    Arthritis      Asthma      CHF (congestive heart failure)      COPD (chronic obstructive pulmonary disease)      Hypothyroidism      Stroke      \"mini stroke\" at the age of 16, no deficits per pt report     SVT (supraventricular tachycardia)       prior hx of     Wears glasses           Surgical History         Past Surgical History:   Procedure Laterality Date    CARDIAC ELECTROPHYSIOLOGY PROCEDURE N/A 11/3/2017     Procedure: Ablation atrial flutter vs SVT;  Surgeon: Chavo Carr MD;  Location: [facility] INVASIVE LOCATION;  Service:     CHOLECYSTECTOMY   2010    NECK SURGERY       TUBAL ABDOMINAL LIGATION              General Information        Row Name 25 1322                 Physical Therapy Time and Intention     Document Type evaluation  -MK       Mode of Treatment physical therapy  " -          Row Name 04/11/25 1322                 General Information     Patient Profile Reviewed yes  -       Prior Level of Function independent:;ADL's;all household mobility  -       Existing Precautions/Restrictions fall  -       Barriers to Rehab medically complex;previous functional deficit  -          Row Name 04/11/25 1322                 Living Environment     Current Living Arrangements home  -       People in Home grandchild(pearl)  -          Row Name 04/11/25 1322                 Home Main Entrance     Number of Stairs, Main Entrance none  -          Row Name 04/11/25 1322                 Stairs Within Home, Primary     Stairs, Within Home, Primary stairs to second level, pt sleeps on the couch on the first floor  -       Number of Stairs, Within Home, Primary twelve  -       Stair Railings, Within Home, Primary railing on left side (ascending)  -          Row Name 04/11/25 1322                 Cognition     Orientation Status (Cognition) oriented x 4  -[facility] Name 04/11/25 1322                 Safety Issues/Impairments Affecting Functional Mobility     Safety Issues Affecting Function (Mobility) safety precautions follow-through/compliance;safety precaution awareness  -       Impairments Affecting Function (Mobility) balance;endurance/activity tolerance;pain;strength  -                       User Key  (r) = Recorded By, (t) = Taken By, (c) = Cosigned By        Initials Name Provider Type      Fabio Denton, PT Physical Therapist                            Mobility        Row Name 04/11/25 1323                 Bed Mobility     Bed Mobility bed mobility (all) activities  -       All Activities, [place] (Bed Mobility) moderate assist (50% patient effort)  -       Assistive Device (Bed Mobility) head of bed elevated;bed rails  -          Row Name 04/11/25 1323                 Bed-Chair Transfer     Bed-Chair [place] (Transfers) minimum assist (75% patient  effort)  -       Assistive Device (Bed-Chair Transfers) other (see comments)  -       Comment, (Bed-Chair Transfer) HHA and gait belt  -          Row Name 04/11/25 1323                 Sit-Stand Transfer     Sit-Stand [place] (Transfers) minimum assist (75% patient effort)  -       Assistive Device (Sit-Stand Transfers) walker, front-wheeled  -          Row Name 04/11/25 1323                 Gait/Stairs (Locomotion)     Patient was able to Ambulate yes  -       Distance in Feet (Gait) 3  -MK       Bilateral Gait Deviations forward flexed posture;weight shift ability decreased  -                       User Key  (r) = Recorded By, (t) = Taken By, (c) = Cosigned By        Initials Name Provider Type     Fabio Donovan, PT Physical Therapist                            Obj/Interventions        Row Name 04/11/25 1324                 Range of Motion Comprehensive     General Range of Motion no range of motion deficits identified  -          Row Name 04/11/25 1324                 Strength Comprehensive (MMT)     General Manual Muscle Testing (MMT) Assessment lower extremity strength deficits identified  -       Comment, General Manual Muscle Testing (MMT) Assessment B LE 4/5  -          Row Name 04/11/25 1324                 Balance     Balance Assessment sitting static balance;sitting dynamic balance;standing static balance;standing dynamic balance  -       Static Sitting Balance standby assist  -       Dynamic Sitting Balance moderate assist  -       Position, Sitting Balance unsupported;sitting edge of bed  -       Static Standing Balance minimal assist  -       Dynamic Standing Balance minimal assist  -       Position/Device Used, Standing Balance walker, front-wheeled  -       Balance Interventions sitting;standing;sit to stand  -                       User Key  (r) = Recorded By, (t) = Taken By, (c) = Cosigned By        Initials Name Provider Type     Fabio Donovan, PT  Physical Therapist                            Goals/Plan         Row Name 04/11/25 1328                 Bed Mobility Goal 1 (PT)     Activity/Assistive Device (Bed Mobility Goal 1, PT) bed mobility activities, all  -MK       [place] Level/Cues Needed (Bed Mobility Goal 1, PT) minimum assist (75% or more patient effort)  -MK       Time Frame (Bed Mobility Goal 1, PT) short term goal (STG);5 days  -MK       Progress/Outcomes (Bed Mobility Goal 1, PT) new goal  -          Row Name 04/11/25 1328                 Transfer Goal 1 (PT)     Activity/Assistive Device (Transfer Goal 1, PT) sit-to-stand/stand-to-sit  -MK       [place] Level/Cues Needed (Transfer Goal 1, PT) minimum assist (75% or more patient effort)  -MK       Time Frame (Transfer Goal 1, PT) long term goal (LTG);10 days  -MK       Progress/Outcome (Transfer Goal 1, PT) new goal  -          Row Name 04/11/25 1328                 Gait Training Goal 1 (PT)     Activity/Assistive Device (Gait Training Goal 1, PT) gait (walking locomotion)  -MK       [place] Level (Gait Training Goal 1, PT) minimum assist (75% or more patient effort)  -MK       Distance (Gait Training Goal 1, PT) 25 ft  -MK       Time Frame (Gait Training Goal 1, PT) long term goal (LTG);10 days  -MK       Progress/Outcome (Gait Training Goal 1, PT) new goal  -          Row Name 04/11/25 1328                 Patient Education Goal (PT)     Activity (Patient Education Goal, PT) Pt to participate in B LE ther ex at least 3x per week  -MK       [place]/Cues/Accuracy (Memory Goal 2, PT) demonstrates adequately  -MK       Time Frame (Patient Education Goal, PT) long term goal (LTG);10 days  -MK       Progress/Outcome (Patient Education Goal, PT) new goal  -          Row Name 04/11/25 1328                 Therapy Assessment/Plan (PT)     Planned Therapy Interventions (PT) balance training;bed mobility training;gait training;home exercise program;motor coordination  training;neuromuscular re-education;patient/family education;strengthening;transfer training  -                    User Key  (r) = Recorded By, (t) = Taken By, (c) = Cosigned By        Initials Name Provider Type     Fabio Donovan, PT Physical Therapist                         Clinical Impression        Row Name 04/11/25 3555                 Pain     Pretreatment Pain Rating 0/10 - no pain  -       Posttreatment Pain Rating 5/10  -       Pain Location extremity  -       Pain Side/Orientation bilateral;lower  -       Pain Management Interventions exercise or physical activity utilized;activity modification encouraged  -       Response to Pain Interventions activity participation with increased pain  -          Row Name 04/11/25 8352                 Plan of Care Review     Plan of Care Reviewed With patient  -MK       Progress no change  -       Outcome Evaluation pt participated in PT evaluation this date. Pt lives with her granddaughter and family. Pt reports that she is previously IND with all ADLs and mobility. Pt required mod A for bed mobility and sat EOB SBA. pt completed STS min A using RW and was able to tf to bedside chair min A. pt reports that she is unable to walk further at this point. Pt reports increased pain in B LEs, especially R LE. pt is expected to benefit from skilled PT during this inpatient stay to maximize functional mobility and IND. pt would further benefit from STR upon dc.  -          Row Name 04/11/25 7946                 Therapy Assessment/Plan (PT)     Patient/Family Therapy Goals Statement (PT) pt would like to go to [facility]  -       Rehab Potential (PT) good  -       Criteria for Skilled Interventions Met (PT) yes;skilled treatment is necessary  -       Therapy Frequency (PT) 5 times/wk  -       Predicted Duration of Therapy Intervention (PT) 2 weeks  -          Row Name 04/11/25 7735                 Vital Signs     O2 Delivery Pre Treatment supplemental O2   -MK       O2 Delivery Intra Treatment supplemental O2  -       O2 Delivery Post Treatment supplemental O2  -       Pre Patient Position Supine  -MK       Intra Patient Position Standing  -MK       Post Patient Position Sitting  -          Row Name 04/11/25 1325                 Positioning and Restraints     Pre-Treatment Position in bed  -MK       Post Treatment Position chair  -MK       In Chair sitting;call light within reach;encouraged to call for assist  -                       User Key  (r) = Recorded By, (t) = Taken By, (c) = Cosigned By        Initials Name Provider Type     Fabio Donovan, PT Physical Therapist                            Outcome Measures        Row Name 04/11/25 1330 04/11/25 0800            How much help from another person do you currently need...     Turning from your back to your side while in flat bed without using bedrails? 4  -MK 4  -KJ     Moving from lying on back to sitting on the side of a flat bed without bedrails? 3  - 3  -KJ     Moving to and from a bed to a chair (including a wheelchair)? 3  - 3  -KJ     Standing up from a chair using your arms (e.g., wheelchair, bedside chair)? 2  - 2  -KJ     Climbing 3-5 steps with a railing? 1  -MK 1  -KJ     To walk in hospital room? 2  -MK 2  -KJ     AM-PAC 6 Clicks Score (PT) 15  - 15  -KJ     Highest Level of Mobility Goal 4 --> Transfer to chair/commode  - 4 --> Transfer to chair/commode  -KJ        Row Name 04/11/25 1330 04/11/25 1255            Functional Assessment     Outcome Measure Options AM-PAC 6 Clicks Basic Mobility (PT)  - AM-PAC 6 Clicks Daily Activity (OT)  -                     User Key  (r) = Recorded By, (t) = Taken By, (c) = Cosigned By        Initials Name Provider Type     Jazmin Hager Occupational Therapist     Fabio Donovan, PT Physical Therapist     Carmenza Blakely, RN Registered Nurse                          Physical Therapy Education           Title: PT OT SLP Therapies (In  Progress)         Topic: Physical Therapy (In Progress)         Point: Mobility training (Done)         Learning Progress Summary             Patient Acceptance, E,D, DU,VU by  at 4/11/2025 1331     Comment: educated on purpose of PT and importance of mobility                            Point: Home exercise program (Not Started)         Learner Progress:  Not documented in this visit.                  Point: Body mechanics (Done)         Learning Progress Summary             Patient Acceptance, E,D, DU,VU by  at 4/11/2025 1331     Comment: educated on purpose of PT and importance of mobility                            Point: Precautions (Not Started)         Learner Progress:  Not documented in this visit.                                      User Key         Initials Effective Dates Name Provider Type Discipline      06/13/23 -  Fabio Denton, PT Physical Therapist PT                          PT Recommendation and Plan  Planned Therapy Interventions (PT): balance training, bed mobility training, gait training, home exercise program, motor coordination training, neuromuscular re-education, patient/family education, strengthening, transfer training  Progress: no change  Outcome Evaluation: pt participated in PT evaluation this date. Pt lives with her granddaughter and family. Pt reports that she is previously IND with all ADLs and mobility. Pt required mod A for bed mobility and sat EOB SBA. pt completed STS min A using RW and was able to tf to bedside chair min A. pt reports that she is unable to walk further at this point. Pt reports increased pain in B LEs, especially R LE. pt is expected to benefit from skilled PT during this inpatient stay to maximize functional mobility and IND. pt would further benefit from STR upon dc.      Time Calculation:   PT Evaluation Complexity  History, PT Evaluation Complexity: 1-2 personal factors and/or comorbidities  Examination of Body Systems (PT Eval Complexity): total of  3 or more elements  Clinical Presentation (PT Evaluation Complexity): evolving  Clinical Decision Making (PT Evaluation Complexity): moderate complexity  Overall Complexity (PT Evaluation Complexity): moderate complexity       PT Charges         Row Name 25 1114                       Time Calculation     Start Time 1114  -MK         PT Received On 25  -MK         PT Goal Re-Cert Due Date 25  -MK                 Untimed Charges     PT Eval/Re-eval Minutes 55  -MK                 Total Minutes     Untimed Charges Total Minutes 55  -MK          Total Minutes 55  -MK                      User Key  (r) = Recorded By, (t) = Taken By, (c) = Cosigned By        Initials Name Provider Type     Fabio Donovan PT Physical Therapist                       Therapy Charges for Today         Code Description Service Date Service Provider Modifiers Qty     69445540626 HC PT EVAL MOD COMPLEXITY 4 2025 Fabio Denton, PT GP 1                PT G-Codes  Outcome Measure Options: AM-PAC 6 Clicks Basic Mobility (PT)  AM-PAC 6 Clicks Score (PT): 15  AM-PAC 6 Clicks Score (OT): 15  PT Discharge Summary  Anticipated Discharge Disposition (PT): inpatient rehabilitation facility     Fabio Denton PT                    2025                                        Occupational Therapy Notes (all)        Jazmin Vergara at 25 1550          Patient Name: Patsy Frazier  : 1960    MRN: [id number]                              Today's Date: 2025       Admit Date: 4/10/2025    Visit Dx:     ICD-10-CM ICD-9-CM   1. Peripheral edema  R60.0 782.3   2. Wound of left lower extremity, initial encounter  S81.802A 894.0   3. Wound of right lower extremity, initial encounter  S81.801A 894.0   4. Bilateral cellulitis of lower leg  L03.116 682.6    L03.115      Patient Active Problem List   Diagnosis    Atrial flutter with rapid ventricular response    Tobacco abuse    SVT (supraventricular tachycardia)     "Atrial flutter    Acute hypoxic respiratory failure    Hypoxia    COPD (chronic obstructive pulmonary disease)    Bilateral lower extremity edema    Weakness    Lower extremity cellulitis    Obesity, Class III, BMI 40-49.9 (morbid obesity)     Past Medical History:   Diagnosis Date    Arthritis     Asthma     CHF (congestive heart failure)     COPD (chronic obstructive pulmonary disease)     Hypothyroidism     Stroke 1976    \"mini stroke\" at the age of 16, no deficits per pt report     SVT (supraventricular tachycardia)     prior hx of     Wears glasses      Past Surgical History:   Procedure Laterality Date    CARDIAC ELECTROPHYSIOLOGY PROCEDURE N/A 11/3/2017    Procedure: Ablation atrial flutter vs SVT;  Surgeon: Chavo Carr MD;  Location: [facility] INVASIVE LOCATION;  Service:     CHOLECYSTECTOMY  2010    NECK SURGERY  2007    TUBAL ABDOMINAL LIGATION  1981      General Information       [facility] Name 04/14/25 1543          OT Time and Intention    Document Type therapy note (daily note)  -     Mode of Treatment occupational therapy  -     Patient Effort good  -[facility] Name 04/14/25 1548          General Information    Patient Profile Reviewed yes  -               User Key  (r) = Recorded By, (t) = Taken By, (c) = Cosigned By      Initials Name Provider Type     Jazmin Vergara Occupational Therapist                     Mobility/ADL's       [facility] Name 04/14/25 1543          Bed Mobility    Bed Mobility supine-sit  -     Supine-Sit [place] (Bed Mobility) minimum assist (75% patient effort)  -     Assistive Device (Bed Mobility) head of bed elevated;bed rails  -[facility] Name 04/14/25 1543          Sit-Stand Transfer    Sit-Stand [place] (Transfers) minimum assist (75% patient effort);2 person assist  -     Assistive Device (Sit-Stand Transfers) walker, front-wheeled  -[facility] Name 04/14/25 1543          Functional Mobility    Functional Mobility- Ind. Level minimum assist (75% " patient effort)  -     Functional Mobility- Device walker, front-wheeled  -     Functional Mobility-Distance (Feet) 3  -     Functional Mobility- Comment pt with increased pain in B legs with mobility tasks  -[facility] Name 04/14/25 1543          Bathing Assessment/Intervention    [place] Level (Bathing) moderate assist (50% patient effort)  -AH       Row Name 04/14/25 1543          Lower Body Dressing Assessment/Training    [place] Level (Lower Body Dressing) maximum assist (25% patient effort);dependent (less than 25% patient effort);don;pants/bottoms;socks  -AH       Row Name 04/14/25 1543          Toileting Assessment/Training    Comment, (Toileting) continuing to use purewick  -               User Key  (r) = Recorded By, (t) = Taken By, (c) = Cosigned By      Initials Name Provider Type     Jazmin Vergara Occupational Therapist                   Obj/Interventions    No documentation.                  Goals/Plan    No documentation.                  Clinical Impression       Row Name 04/14/25 1545          Pain Assessment    Pretreatment Pain Rating 2/10  -     Posttreatment Pain Rating 5/10  -     Pain Location extremity  -     Pain Management Interventions exercise or physical activity utilized  -     Response to Pain Interventions activity participation with increased pain  -     Pre/Posttreatment Pain Comment pt reports pain with movement is 10/10+ in her R leg  -AH       Row Name 04/14/25 154          Plan of Care Review    Plan of Care Reviewed With patient  -     Progress no change  -     Outcome Evaluation Pt received supine in bed, willing to work with therapy as she wanted to get oob.  pt sat eob with min assist.  Max/dependent for donning brief and socks.  Pt was able to lift her leg to assist OT in donning LB items. Pt stood with min assist of 2, bed height elevated to assist in standing.  She used the walker with min assist to walk 3' to her chair, increased  time needed d/t pain.  Pt was left sitting reclined in her chair with call light within reach and fress ice water was provided for her.  Pt is motivated to get stronger and be able to take care of herself.  Cont OT per POC.  -       Row Name 04/14/25 1545          Therapy Plan Review/Discharge Plan (OT)    Anticipated Discharge Disposition (OT) sub acute care setting  -       Row Name 04/14/25 1545          Positioning and Restraints    Pre-Treatment Position in bed  -     Post Treatment Position chair  -     In Chair reclined;call light within reach;encouraged to call for assist  -               User Key  (r) = Recorded By, (t) = Taken By, (c) = Cosigned By      Initials Name Provider Type    Jazmin Hager Occupational Therapist                   Outcome Measures       Row Name 04/14/25 1548          How much help from another is currently needed...    Putting on and taking off regular lower body clothing? 2  -AH     Bathing (including washing, rinsing, and drying) 2  -AH     Toileting (which includes using toilet bed pan or urinal) 2  -AH     Putting on and taking off regular upper body clothing 3  -AH     Taking care of personal grooming (such as brushing teeth) 3  -AH     Eating meals 3  -AH     AM-PAC 6 Clicks Score (OT) 15  -       Row Name 04/14/25 1519 04/14/25 0828       How much help from another person do you currently need...    Turning from your back to your side while in flat bed without using bedrails? 3  -MS 3  -BW    Moving from lying on back to sitting on the side of a flat bed without bedrails? 3  -MS 3  -BW    Moving to and from a bed to a chair (including a wheelchair)? 3  -MS 2  -BW    Standing up from a chair using your arms (e.g., wheelchair, bedside chair)? 3  -MS 2  -BW    Climbing 3-5 steps with a railing? 2  -MS 1  -BW    To walk in hospital room? 2  -MS 2  -BW    AM-PAC 6 Clicks Score (PT) 16  -MS 13  -BW    Highest Level of Mobility Goal 5 --> Static standing  -MS 4 -->  Transfer to chair/commode  -              User Key  (r) = Recorded By, (t) = Taken By, (c) = Cosigned By      Initials Name Provider Type     Jazmin Vergara Occupational Therapist    Arjun Marsh, PT Physical Therapist    Grecia Champion, RN Registered Nurse                    Occupational Therapy Education       Title: PT OT SLP Therapies (In Progress)       Topic: Occupational Therapy (In Progress)       Point: ADL training (Done)       Learning Progress Summary            Patient Acceptance, E,TB, VU by  at 4/14/2025 1549    Comment: benefit of activity to improve functional independence    Acceptance, E,TB, VU by  at 4/11/2025 1256    Comment: Role of OT/POC                                      User Key       Initials Effective Dates Name Provider Type Discipline     06/16/21 -  Jazmin Vergara Occupational Therapist OT                  OT Recommendation and Plan  Planned Therapy Interventions (OT): activity tolerance training, BADL retraining, patient/caregiver education/training, transfer/mobility retraining, strengthening exercise  Therapy Frequency (OT): 3 times/wk  Plan of Care Review  Plan of Care Reviewed With: patient  Progress: no change  Outcome Evaluation: Pt received supine in bed, willing to work with therapy as she wanted to get oob.  pt sat eob with min assist.  Max/dependent for donning brief and socks.  Pt was able to lift her leg to assist OT in donning LB items. Pt stood with min assist of 2, bed height elevated to assist in standing.  She used the walker with min assist to walk 3' to her chair, increased time needed d/t pain.  Pt was left sitting reclined in her chair with call light within reach and fress ice water was provided for her.  Pt is motivated to get stronger and be able to take care of herself.  Cont OT per POC.     Time Calculation:   Evaluation Complexity (OT)  Review Occupational Profile/Medical/Therapy History Complexity: expanded/moderate  complexity  Assessment, Occupational Performance/Identification of Deficit Complexity: 3-5 performance deficits  Clinical Decision Making Complexity (OT): detailed assessment/moderate complexity  Overall Complexity of Evaluation (OT): moderate complexity     Time Calculation- OT       Row Name 04/14/25 1549             Time Calculation- OT    OT Start Time 1430  -      OT Stop Time 1448  -      OT Time Calculation (min) 18 min  -      OT Received On 04/14/25  -      OT Goal Re-Cert Due Date 04/21/25  -         Timed Charges    51036 - OT Therapeutic Activity Minutes 10  -AH      70684 - OT Self Care/Mgmt Minutes 8  -AH         Total Minutes    Timed Charges Total Minutes 18  -       Total Minutes 18  -AH                User Key  (r) = Recorded By, (t) = Taken By, (c) = Cosigned By      Initials Name Provider Type    Jazmin Hager Occupational Therapist                  Therapy Charges for Today       Code Description Service Date Service Provider Modifiers Qty    18328910949  OT THERAPEUTIC ACT EA 15 MIN 4/14/2025 Jazmin Vergara GO 1                 Jazmin Vergara  4/14/2025    Electronically signed by Jazmin Vergara at 04/14/25 1550       Jazmin Vergara at 04/14/25 1549          Goal Outcome Evaluation:  Plan of Care Reviewed With: patient        Progress: no change  Outcome Evaluation: Pt received supine in bed, willing to work with therapy as she wanted to get oob.  pt sat eob with min assist.  Max/dependent for donning brief and socks.  Pt was able to lift her leg to assist OT in donning LB items. Pt stood with min assist of 2, bed height elevated to assist in standing.  She used the walker with min assist to walk 3' to her chair, increased time needed d/t pain.  Pt was left sitting reclined in her chair with call light within reach and fress ice water was provided for her.  Pt is motivated to get stronger and be able to take care of herself.  Cont OT per POC.    Anticipated Discharge  "Disposition (OT): sub acute care setting                          Electronically signed by Jazmin Vergara at 25 1549       Jazmin Vergara at 25 1257          Patient Name: Patsy Frazier  : 1960    MRN: [id number]                              Today's Date: 2025       Admit Date: 4/10/2025    Visit Dx:     ICD-10-CM ICD-9-CM   1. Peripheral edema  R60.0 782.3   2. Wound of left lower extremity, initial encounter  S81.802A 894.0   3. Wound of right lower extremity, initial encounter  S81.801A 894.0   4. Bilateral cellulitis of lower leg  L03.116 682.6    L03.115      Patient Active Problem List   Diagnosis    Atrial flutter with rapid ventricular response    Tobacco abuse    SVT (supraventricular tachycardia)    Atrial flutter    Acute hypoxic respiratory failure    Hypoxia    COPD (chronic obstructive pulmonary disease)    Bilateral lower extremity edema    Weakness     Past Medical History:   Diagnosis Date    Arthritis     Asthma     CHF (congestive heart failure)     COPD (chronic obstructive pulmonary disease)     Hypothyroidism     Stroke     \"mini stroke\" at the age of 16, no deficits per pt report     SVT (supraventricular tachycardia)     prior hx of     Wears glasses      Past Surgical History:   Procedure Laterality Date    CARDIAC ELECTROPHYSIOLOGY PROCEDURE N/A 11/3/2017    Procedure: Ablation atrial flutter vs SVT;  Surgeon: Chavo Carr MD;  Location: [facility] INVASIVE LOCATION;  Service:     CHOLECYSTECTOMY      NECK SURGERY      TUBAL ABDOMINAL LIGATION        General Information       Row Name 25 1221          OT Time and Intention    Document Type evaluation  -AH     Mode of Treatment occupational therapy  -AH     Patient Effort adequate  -AH     Symptoms Noted During/After Treatment increased pain;nausea  -AH       Row Name 25 1221          General Information    Patient Profile Reviewed yes  -AH     Prior Level of Function " independent:;ADL's;all household mobility  pt uses a rollator for household ambulation, she reports she is independent with dressing and toileting, but reports having difficulty with bathing tasks.  Pt performs sponge bath at home  -     Existing Precautions/Restrictions fall  -     Barriers to Rehab medically complex;previous functional deficit  -       Row Name 04/11/25 1221          Occupational Profile    Reason for Services/Referral (Occupational Profile) ADL decline  -[facility] Name 04/11/25 1221          Living Environment    Current Living Arrangements home  -     People in Home grandchild(pearl)  -[facility] Name 04/11/25 1221          Home Main Entrance    Number of Stairs, Main Entrance none  -[facility] Name 04/11/25 1221          Stairs Within Home, Primary    Stairs, Within Home, Primary to second level where full bathroom is located, pt reports she hasn't gone upstairs for a while, she sleeps on the couch  -     Number of Stairs, Within Home, Primary twelve  -     Stair Railings, Within Home, Primary railing on left side (ascending)  -[facility] Name 04/11/25 1221          Cognition    Orientation Status (Cognition) oriented x 4  -[facility] Name 04/11/25 1221          Safety Issues/Impairments Affecting Functional Mobility    Safety Issues Affecting Function (Mobility) safety precaution awareness;safety precautions follow-through/compliance  -     Impairments Affecting Function (Mobility) balance;endurance/activity tolerance;pain;strength  -               User Key  (r) = Recorded By, (t) = Taken By, (c) = Cosigned By      Initials Name Provider Type     Jazmin Vergara Occupational Therapist                     Mobility/ADL's       Row Name 04/11/25 1229          Bed Mobility    Bed Mobility bed mobility (all) activities  -     All Activities, [place] (Bed Mobility) moderate assist (50% patient effort)  -     Assistive Device (Bed Mobility) head of bed elevated;bed  rails  -[facility] Name 04/11/25 1229          Transfers    Transfers sit-stand transfer;bed-chair transfer  -[facility] Name 04/11/25 1229          Bed-Chair Transfer    Bed-Chair [place] (Transfers) minimum assist (75% patient effort)  -     Assistive Device (Bed-Chair Transfers) other (see comments)  -     Comment, (Bed-Chair Transfer) HHA and gait belt  -[facility] Name 04/11/25 1229          Sit-Stand Transfer    Sit-Stand [place] (Transfers) minimum assist (75% patient effort)  -     Assistive Device (Sit-Stand Transfers) walker, front-wheeled  -[facility] Name 04/11/25 1229          Functional Mobility    Functional Mobility- Ind. Level minimum assist (75% patient effort)  -     Functional Mobility-Distance (Feet) 3  -     Functional Mobility- Safety Issues supplemental O2  -     Patient was able to Ambulate yes  -[facility] Name 04/11/25 1229          Activities of Daily Living    BADL Assessment/Intervention bathing;upper body dressing;lower body dressing;grooming;feeding;toileting  -[facility] Name 04/11/25 1229          Bathing Assessment/Intervention    [place] Level (Bathing) moderate assist (50% patient effort)  -[facility] Name 04/11/25 1229          Upper Body Dressing Assessment/Training    [place] Level (Upper Body Dressing) set up  -[facility] Name 04/11/25 1229          Lower Body Dressing Assessment/Training    [place] Level (Lower Body Dressing) maximum assist (25% patient effort)  -[facility] Name 04/11/25 1229          Grooming Assessment/Training    [place] Level (Grooming) set up  -AH       Row Name 04/11/25 1229          Self-Feeding Assessment/Training    [place] Level (Feeding) independent  -AH       Row Name 04/11/25 1229          Toileting Assessment/Training    [place] Level (Toileting) maximum assist (25% patient effort)  -               User Key  (r) = Recorded By, (t) = Taken By, (c) = Cosigned By      Initials  Name Provider Type    Jazmin Hager Occupational Therapist                   Obj/Interventions       [facility] Name 04/11/25 1230          Sensory Assessment (Somatosensory)    Sensory Assessment (Somatosensory) sensation intact  -AH       Row Name 04/11/25 1230          Vision Assessment/Intervention    Visual Impairment/Limitations WFL  -AH       Row Name 04/11/25 1230          Range of Motion Comprehensive    General Range of Motion bilateral upper extremity ROM WFL  -AH       Row Name 04/11/25 1230          Strength Comprehensive (MMT)    Comment, General Manual Muscle Testing (MMT) Assessment BUE 4-/5  -               User Key  (r) = Recorded By, (t) = Taken By, (c) = Cosigned By      Initials Name Provider Type    USE Jazmin Vergara Occupational Therapist                   Goals/Plan       Row Name 04/11/25 1252          Bed Mobility Goal 1 (OT)    Activity/Assistive Device (Bed Mobility Goal 1, OT) bed mobility activities, all  -     [place] Level/Cues Needed (Bed Mobility Goal 1, OT) minimum assist (75% or more patient effort)  -     Time Frame (Bed Mobility Goal 1, OT) by discharge  -     Progress/Outcomes (Bed Mobility Goal 1, OT) goal ongoing  -AH       Row Name 04/11/25 1252          Transfer Goal 1 (OT)    Activity/Assistive Device (Transfer Goal 1, OT) sit-to-stand/stand-to-sit;walker, rolling  -     [place] Level/Cues Needed (Transfer Goal 1, OT) contact guard required  -     Time Frame (Transfer Goal 1, OT) by discharge  -     Progress/Outcome (Transfer Goal 1, OT) goal ongoing  -AH       Row Name 04/11/25 1252          Bathing Goal 1 (OT)    Activity/Device (Bathing Goal 1, OT) bathing skills, all  -     [place] Level/Cues Needed (Bathing Goal 1, OT) minimum assist (75% or more patient effort)  -     Time Frame (Bathing Goal 1, OT) by discharge  -     Progress/Outcomes (Bathing Goal 1, OT) goal ongoing  -AH       Row Name 04/11/25 1252          Dressing Goal 1 (OT)     Activity/Device (Dressing Goal 1, OT) lower body dressing  -     [place]/Cues Needed (Dressing Goal 1, OT) minimum assist (75% or more patient effort)  -     Time Frame (Dressing Goal 1, OT) long term goal (LTG);1 week  -     Progress/Outcome (Dressing Goal 1, OT) goal ongoing  -       Row Name 04/11/25 1252          Toileting Goal 1 (OT)    Activity/Device (Toileting Goal 1, OT) adjust/manage clothing;perform perineal hygiene  -     [place] Level/Cues Needed (Toileting Goal 1, OT) minimum assist (75% or more patient effort)  -     Time Frame (Toileting Goal 1, OT) by discharge  -     Progress/Outcome (Toileting Goal 1, OT) goal ongoing  -       Row Name 04/11/25 1252          Strength Goal 1 (OT)    Strength Goal 1 (OT) Pt will perform UB strengthening ex using theraband for resistance.  -     Time Frame (Strength Goal 1, OT) by discharge  -     Progress/Outcome (Strength Goal 1, OT) goal ongoing  -[facility] Name 04/11/25 1252          Therapy Assessment/Plan (OT)    Planned Therapy Interventions (OT) activity tolerance training;BADL retraining;patient/caregiver education/training;transfer/mobility retraining;strengthening exercise  -               User Key  (r) = Recorded By, (t) = Taken By, (c) = Cosigned By      Initials Name Provider Type     Jazmin Vergara Occupational Therapist                   Clinical Impression       Row Name 04/11/25 1230          Pain Assessment    Pretreatment Pain Rating 0/10 - no pain  -     Posttreatment Pain Rating 5/10  -     Pain Location extremity  -     Pain Side/Orientation bilateral;lower  -     Pain Management Interventions exercise or physical activity utilized  -     Response to Pain Interventions activity participation with increased pain  -     Pre/Posttreatment Pain Comment pt reports pain increasing to 10/10 with activity  -       Row Name 04/11/25 1230          Plan of Care Review    Plan of Care Reviewed With patient   -     Progress no change  -     Outcome Evaluation Pt seen for OT evaluation today.  Pt lives at home with her granddaughter and her family.  She reports she is normally able to take care of herself.  She sleeps on the couch and sponge bathes, but reports is has been getting more difficult.   Pt required mod assist to sit eob, min assist to stand and min assist to transfer to the chair ~3'.  Pt reported increased pain with activity in both legs, but expecially her R leg.  Pt is mod/max assist for bathing, dressing and toileting.  Pt is expected to benefit from skilled OT to improve her strength, activity tolerance and independence with ADL tasks.  Pt would benefit from STR d/t pain, weakness and decreased ability to appropriately care for herself.  -       Row Name 04/11/25 1230          Therapy Assessment/Plan (OT)    Patient/Family Therapy Goal Statement (OT) d/c rehab  -     Rehab Potential (OT) good  -     Criteria for Skilled Therapeutic Interventions Met (OT) yes;skilled treatment is necessary  -     Therapy Frequency (OT) 3 times/wk  -       Row Name 04/11/25 1230          Therapy Plan Review/Discharge Plan (OT)    Anticipated Discharge Disposition (OT) sub acute care setting  -       Row Name 04/11/25 1230          Positioning and Restraints    Pre-Treatment Position in bed  -     Post Treatment Position chair  -     In Chair sitting;call light within reach;encouraged to call for assist  -               User Key  (r) = Recorded By, (t) = Taken By, (c) = Cosigned By      Initials Name Provider Type     Jazmin Vergara Occupational Therapist                   Outcome Measures       Row Name 04/11/25 7159          How much help from another is currently needed...    Putting on and taking off regular lower body clothing? 2  -AH     Bathing (including washing, rinsing, and drying) 2  -AH     Toileting (which includes using toilet bed pan or urinal) 2  -AH     Putting on and taking off  regular upper body clothing 3  -AH     Taking care of personal grooming (such as brushing teeth) 3  -AH     Eating meals 3  -     AM-PAC 6 Clicks Score (OT) 15  -AH       Row Name 04/11/25 0800          How much help from another person do you currently need...    Turning from your back to your side while in flat bed without using bedrails? 4  -KJ     Moving from lying on back to sitting on the side of a flat bed without bedrails? 3  -KJ     Moving to and from a bed to a chair (including a wheelchair)? 3  -KJ     Standing up from a chair using your arms (e.g., wheelchair, bedside chair)? 2  -KJ     Climbing 3-5 steps with a railing? 1  -KJ     To walk in hospital room? 2  -KJ     AM-PAC 6 Clicks Score (PT) 15  -KJ       Row Name 04/11/25 1255          Functional Assessment    Outcome Measure Options AM-PAC 6 Clicks Daily Activity (OT)  -               User Key  (r) = Recorded By, (t) = Taken By, (c) = Cosigned By      Initials Name Provider Type     Jazmin Vergara Occupational Therapist    Carmenza Blakely, RN Registered Nurse                    Occupational Therapy Education       Title: PT OT SLP Therapies (In Progress)       Topic: Occupational Therapy (In Progress)       Point: ADL training (Done)       Learning Progress Summary            Patient Acceptance, E,TB, VU by  at 4/11/2025 1256    Comment: Role of OT/POC                                      User Key       Initials Effective Dates Name Provider Type Discipline     06/16/21 -  Jazmin Vergara Occupational Therapist OT                  OT Recommendation and Plan  Planned Therapy Interventions (OT): activity tolerance training, BADL retraining, patient/caregiver education/training, transfer/mobility retraining, strengthening exercise  Therapy Frequency (OT): 3 times/wk  Plan of Care Review  Plan of Care Reviewed With: patient  Progress: no change  Outcome Evaluation: Pt seen for OT evaluation today.  Pt lives at home with her granddaughter and  her family.  She reports she is normally able to take care of herself.  She sleeps on the couch and sponge bathes, but reports is has been getting more difficult.   Pt required mod assist to sit eob, min assist to stand and min assist to transfer to the chair ~3'.  Pt reported increased pain with activity in both legs, but expecially her R leg.  Pt is mod/max assist for bathing, dressing and toileting.  Pt is expected to benefit from skilled OT to improve her strength, activity tolerance and independence with ADL tasks.  Pt would benefit from STR d/t pain, weakness and decreased ability to appropriately care for herself.     Time Calculation:   Evaluation Complexity (OT)  Review Occupational Profile/Medical/Therapy History Complexity: expanded/moderate complexity  Assessment, Occupational Performance/Identification of Deficit Complexity: 3-5 performance deficits  Clinical Decision Making Complexity (OT): detailed assessment/moderate complexity  Overall Complexity of Evaluation (OT): moderate complexity     Time Calculation- OT       Row Name 04/11/25 1257             Time Calculation- OT    OT Start Time 1115  -AH      OT Received On 04/11/25  -      OT Goal Re-Cert Due Date 04/21/25  -         Untimed Charges    OT Eval/Re-eval Minutes 53  -AH         Total Minutes    Untimed Charges Total Minutes 53  -AH       Total Minutes 53  -AH                User Key  (r) = Recorded By, (t) = Taken By, (c) = Cosigned By      Initials Name Provider Type    Jazmin Hager Occupational Therapist                  Therapy Charges for Today       Code Description Service Date Service Provider Modifiers Qty    [id number] HC OT EVAL MOD COMPLEXITY 4 4/11/2025 Jazmin Vergara GO 1                 Jazmin Vergara  4/11/2025    Electronically signed by Jazmin Vergara at 04/11/25 1257       Jazmin Vregara at 04/11/25 1256          Goal Outcome Evaluation:  Plan of Care Reviewed With: patient        Progress: no change  Outcome  Evaluation: Pt seen for OT evaluation today.  Pt lives at home with her granddaughter and her family.  She reports she is normally able to take care of herself.  She sleeps on the couch and sponge bathes, but reports is has been getting more difficult.   Pt required mod assist to sit eob, min assist to stand and min assist to transfer to the chair ~3'.  Pt reported increased pain with activity in both legs, but expecially her R leg.  Pt is mod/max assist for bathing, dressing and toileting.  Pt is expected to benefit from skilled OT to improve her strength, activity tolerance and independence with ADL tasks.  Pt would benefit from STR d/t pain, weakness and decreased ability to appropriately care for herself.    Anticipated Discharge Disposition (OT): sub acute care setting                          Electronically signed by Jazmin Vergara at 04/11/25 7097

## 2025-04-14 NOTE — PLAN OF CARE
Goal Outcome Evaluation:   VSS on 2L. IV antibiotics administered per order. No acute events during shift. Plan of care ongoing.

## 2025-04-14 NOTE — PROGRESS NOTES
[facility]    Name:  Patsy Frazier   Age:  64 y.o.  Sex:  female  :  1960  MRN:  [id number]   Visit Number:  [id number]  Admission Date:  4/10/2025  Date Of Service:  25  Primary Care Physician:  Eva Washburn APRN     LOS: 1 day :    Chief Complaint:      Leg swelling and right knee joint pain.    Subjective:    Patsy Frazier was seen and examined this morning.  She is lying down on the bed and is comfortable at rest.  Still has some pain in her right knee but it has improved.  No significant overnight events.    Hospital Course:    Patsy Frazier is a 64-year-old female, history of congestive heart failure, COPD, stroke, SVT and atrial flutter status post ablation on rivaroxaban, presented for evaluation of bilateral lower extremity pain and swelling.  This has been ongoing for multiple months patient reports that the pain and swelling has increased over the last couple of days more than it had been, decreased mobility because of this, she reports that she has been using pads wrapped around her leg to help with the weeping edema. Intermittent oxygen use at 3 L as needed, denies fevers.  Reports that this all started when she had wounds that were previously healed but accidentally nicked one of her legs and created an abrasion .  History of leg edema since age 18.    In the emergency room, she was afebrile and hemodynamically stable saturating at 97% on 3 L of nasal cannula oxygen.  Serial troponin and proBNP were within normal range.  CMP was unremarkable except for glucose of 137.  Lactic acid, procalcitonin and CBC were unremarkable except for hemoglobin of 11.  Chest x-ray was unremarkable.  Venous duplex scan of lower extremities was negative for DVT.  Patient was noted to have bilateral lower extremity cellulitis and was initiated on antibody therapy with doxycycline and ceftriaxone.  Due to significant generalized  weakness and inability to care for self, she was admitted to the medical floor for evaluation by case management for discharge planning.     Review of Systems:     All systems were reviewed and negative except as mentioned in subjective, assessment and plan.    Vital Signs:    Temp:  [98 °F (36.7 °C)-98.2 °F (36.8 °C)] 98 °F (36.7 °C)  Heart Rate:  [70-83] 79  Resp:  [18] 18  BP: (110-117)/(46-53) 110/46    Intake and output:    I/O last 3 completed shifts:  In: 600 [P.O.:600]  Out: 3200 [Urine:3200]  No intake/output data recorded.    Physical Examination:    General Appearance:  Alert and cooperative.  Obese.   Head:  Atraumatic and normocephalic.   Eyes: Conjunctivae and sclerae normal, no icterus. No pallor.   Throat: No oral lesions, no thrush, oral mucosa moist.   Neck: Supple, trachea midline, no thyromegaly.   Lungs:   Breath sounds heard bilaterally equally.  No wheezing or crackles. No Pleural rub or bronchial breathing.   Heart:  Normal S1 and S2, no murmur, no gallop, no rub. No JVD.   Abdomen:   Normal bowel sounds, no masses, no organomegaly. Soft, nontender, obese with a large pannus, no rebound tenderness.   Extremities: Supple, no cyanosis, no clubbing.  3-4+ pitting edema noted bilateral lower extremities up to the knees.  Increased erythema noted bilateral lower legs with excoriations and eczema.  Please see nursing note and photographs in chart for further details.  No significant right knee joint tenderness, swelling or redness noted.   Skin: No bleeding or rash.   Neurologic: Alert and oriented x 3. No facial asymmetry. Moves all four limbs but does have generalized weakness. No tremors.    Laboratory results:    Results from last 7 days   Lab Units 04/14/25  0510 04/12/25  0544 04/11/25  0547 04/10/25  1842 04/10/25  1217   SODIUM mmol/L 138 138 135*   < > 141   POTASSIUM mmol/L 3.6 3.9 4.5   < > 4.4   CHLORIDE mmol/L 95* 97* 98   < > 99   CO2 mmol/L 35.6* 33.3* 30.8*   < > 33.7*   BUN mg/dL  16 21 25*   < > 26*   CREATININE mg/dL 0.54* 0.61 0.57   < > 0.63   CALCIUM mg/dL 8.5* 8.7 8.7   < > 9.4   BILIRUBIN mg/dL  --   --   --   --  0.2   ALK PHOS U/L  --   --   --   --  68   ALT (SGPT) U/L  --   --   --   --  11   AST (SGOT) U/L  --   --   --   --  12   GLUCOSE mg/dL 103* 100* 107*   < > 137*    < > = values in this interval not displayed.     Results from last 7 days   Lab Units 04/14/25  0510 04/11/25  0547 04/10/25  1842   WBC 10*3/mm3 7.46 5.90 8.41   HEMOGLOBIN g/dL 11.0* 10.3* 11.7*   HEMATOCRIT % 34.8 33.2* 37.3   PLATELETS 10*3/mm3 270 283 295         Results from last 7 days   Lab Units 04/10/25  1318 04/10/25  1217   HSTROP T ng/L 8 7       I have reviewed the patient's laboratory results.    Radiology results:    No radiology results from the last 24 hrs    I have reviewed the patient's radiology reports.    Medication Review:     I have reviewed the patient's active and prn medications.     Problem List:      Weakness    Lower extremity cellulitis    Obesity, Class III, BMI 40-49.9 (morbid obesity)    Assessment:    Bilateral lower extremity cellulitis, POA.  Chronic bilateral lower venous insufficiency and lymphedema.  History of atrial flutter status post ablation on rivaroxaban.  Morbid obesity with a BMI of 47.  Right knee joint osteoarthritis.  Acquired hypothyroidism.  COPD, no exacerbation.  Impaired mobility and ADLs.  No evidence of CHF.    Plan:    Bilateral lower extremity cellulitis/lymphedema.  - Continue ceftriaxone and doxycycline.  - Patient was seen by wound care services and their recommendations appreciated.  - Obtain nasal swab for MRSA screen.  - Keep extremities elevated.  - Bilateral lower extremity venous duplex was negative for DVT or Baker's cyst.  - Obtain wound culture.    History of atrial flutter status post ablation.  - Continue rivaroxaban, metoprolol.    COPD/hypothyroidism.  - Continue home medications.    Right knee joint osteoarthritis.  - Continue Lortab  for pain control.  - Continue physical therapy.    I have reviewed the copied text and it is accurate as of 04/14/25    DVT Prophylaxis: Rivaroxaban  Code Status: Full  Diet: Cardiac diabetic  Discharge Plan: Awaiting short-term rehab placement.    John Wills MD  04/14/25  11:21 EDT    Dictated utilizing Dragon dictation.

## 2025-04-14 NOTE — CASE MANAGEMENT/SOCIAL WORK
Case Management/Social Work    Patient Name:  Patsy Frazier  YOB: 1960  MRN: [id number]  Admit Date:  4/10/2025        12:16 EDT   Discharge Plan       Row Name 04/14/25 1214       Plan    Plan Auth started as pt would like STR at [facility].                        Electronically signed by:  Lorrie Herrera RN  04/14/25 12:16 EDT

## 2025-04-15 ENCOUNTER — APPOINTMENT (OUTPATIENT)
Dept: GENERAL RADIOLOGY | Facility: HOSPITAL | Age: 65
End: 2025-04-15
Payer: MEDICARE

## 2025-04-15 VITALS
HEART RATE: 76 BPM | SYSTOLIC BLOOD PRESSURE: 109 MMHG | RESPIRATION RATE: 18 BRPM | BODY MASS INDEX: 44.41 KG/M2 | DIASTOLIC BLOOD PRESSURE: 50 MMHG | TEMPERATURE: 97.8 F | WEIGHT: 293 LBS | HEIGHT: 68 IN | OXYGEN SATURATION: 93 %

## 2025-04-15 PROCEDURE — 94799 UNLISTED PULMONARY SVC/PX: CPT

## 2025-04-15 PROCEDURE — G0378 HOSPITAL OBSERVATION PER HR: HCPCS

## 2025-04-15 PROCEDURE — 73560 X-RAY EXAM OF KNEE 1 OR 2: CPT

## 2025-04-15 PROCEDURE — 94761 N-INVAS EAR/PLS OXIMETRY MLT: CPT

## 2025-04-15 PROCEDURE — 94664 DEMO&/EVAL PT USE INHALER: CPT

## 2025-04-15 PROCEDURE — 25010000002 DOXYCYCLINE 100 MG RECONSTITUTED SOLUTION 1 EACH VIAL: Performed by: STUDENT IN AN ORGANIZED HEALTH CARE EDUCATION/TRAINING PROGRAM

## 2025-04-15 PROCEDURE — 99238 HOSP IP/OBS DSCHRG MGMT 30/<: CPT | Performed by: INTERNAL MEDICINE

## 2025-04-15 PROCEDURE — 63710000001 REVEFENACIN 175 MCG/3ML SOLUTION: Performed by: STUDENT IN AN ORGANIZED HEALTH CARE EDUCATION/TRAINING PROGRAM

## 2025-04-15 RX ORDER — ATORVASTATIN CALCIUM 40 MG/1
40 TABLET, FILM COATED ORAL NIGHTLY
Qty: 30 TABLET | Refills: 0 | Status: SHIPPED | OUTPATIENT
Start: 2025-04-15

## 2025-04-15 RX ORDER — DOXYCYCLINE 100 MG/1
100 CAPSULE ORAL 2 TIMES DAILY
Qty: 6 CAPSULE | Refills: 0 | Status: SHIPPED | OUTPATIENT
Start: 2025-04-15 | End: 2025-04-18

## 2025-04-15 RX ORDER — AMOXICILLIN 250 MG
1 CAPSULE ORAL 2 TIMES DAILY
Status: DISCONTINUED | OUTPATIENT
Start: 2025-04-15 | End: 2025-04-15 | Stop reason: HOSPADM

## 2025-04-15 RX ORDER — POLYETHYLENE GLYCOL 3350 17 G/17G
17 POWDER, FOR SOLUTION ORAL DAILY
Status: DISCONTINUED | OUTPATIENT
Start: 2025-04-15 | End: 2025-04-15 | Stop reason: HOSPADM

## 2025-04-15 RX ORDER — CEPHALEXIN 500 MG/1
500 CAPSULE ORAL 3 TIMES DAILY
Qty: 9 CAPSULE | Refills: 0 | Status: SHIPPED | OUTPATIENT
Start: 2025-04-15 | End: 2025-04-18

## 2025-04-15 RX ADMIN — WHITE PETROLATUM 1 APPLICATION: 1.75 OINTMENT TOPICAL at 05:48

## 2025-04-15 RX ADMIN — HYDROCODONE BITARTRATE AND ACETAMINOPHEN 2 TABLET: 5; 325 TABLET ORAL at 03:49

## 2025-04-15 RX ADMIN — REVEFENACIN 175 MCG: 175 SOLUTION RESPIRATORY (INHALATION) at 07:00

## 2025-04-15 RX ADMIN — SENNOSIDES AND DOCUSATE SODIUM 1 TABLET: 50; 8.6 TABLET ORAL at 08:29

## 2025-04-15 RX ADMIN — DOXYCYCLINE 100 MG: 100 INJECTION, POWDER, LYOPHILIZED, FOR SOLUTION INTRAVENOUS at 11:22

## 2025-04-15 RX ADMIN — ARFORMOTEROL TARTRATE 15 MCG: 15 SOLUTION RESPIRATORY (INHALATION) at 07:00

## 2025-04-15 RX ADMIN — Medication 10 ML: at 08:30

## 2025-04-15 RX ADMIN — LEVOTHYROXINE SODIUM 88 MCG: 88 TABLET ORAL at 06:04

## 2025-04-15 RX ADMIN — LEVOTHYROXINE SODIUM 200 MCG: 0.1 TABLET ORAL at 06:04

## 2025-04-15 RX ADMIN — FUROSEMIDE 80 MG: 40 TABLET ORAL at 08:29

## 2025-04-15 RX ADMIN — POLYETHYLENE GLYCOL 3350 17 G: 17 POWDER, FOR SOLUTION ORAL at 08:29

## 2025-04-15 RX ADMIN — HYDROCODONE BITARTRATE AND ACETAMINOPHEN 2 TABLET: 5; 325 TABLET ORAL at 11:22

## 2025-04-15 NOTE — PLAN OF CARE
Goal Outcome Evaluation:     Problem: Adult Inpatient Plan of Care  Goal: Plan of Care Review  Outcome: Progressing  Goal: Patient-Specific Goal (Individualized)  Outcome: Progressing  Goal: Absence of Hospital-Acquired Illness or Injury  Outcome: Progressing  Intervention: Identify and Manage Fall Risk  Description: Perform standard risk assessment on admission using a validated tool or comprehensive approach appropriate to the patient; reassess fall risk frequently, with change in status or transfer to another level of care.Communicate risk to interprofessional healthcare team; ensure fall risk visible cue.Determine need for increased observation, equipment and environmental modification, as well as use of supportive, nonskid footwear.Adjust safety measures to individual needs and identified risk factors.Reinforce the importance of active participation with fall risk prevention, safety, and physical activity with the patient and family.Perform regular intentional rounding to assess need for position change, pain assessment and personal needs, including assistance with toileting.  Recent Flowsheet Documentation  Taken 4/15/2025 0200 by Hermelinda Nathan RN  Safety Promotion/Fall Prevention: safety round/check completed  Taken 4/15/2025 0000 by Hermelinda Nathan RN  Safety Promotion/Fall Prevention: safety round/check completed  Taken 4/14/2025 2200 by Hermelinda Nathan RN  Safety Promotion/Fall Prevention: safety round/check completed  Taken 4/14/2025 2000 by Hermelinda Nathan RN  Safety Promotion/Fall Prevention: safety round/check completed  Intervention: Prevent Skin Injury  Description: Perform a screening for skin injury risk, such as pressure or moisture-associated skin damage on admission and at regular intervals throughout hospital stay.Keep all areas of skin (especially folds) clean and dry.Maintain adequate skin hydration.Relieve and redistribute pressure and protect bony prominences and skin at  risk for injury; implement measures based on patient-specific risk factors.Match turning and repositioning schedule to clinical condition.Encourage weight shift frequently; assist with reposition if unable to complete independently.Float heels off bed; avoid pressure on the Achilles tendon.Keep skin free from extended contact with medical devices.Optimize nutrition and hydration.Encourage functional activity and mobility, as early as tolerated.Use aids (e.g., slide boards, mechanical lift) during transfer.  Recent Flowsheet Documentation  Taken 4/15/2025 0200 by Hermelinda Nathan RN  Body Position: sitting up in bed  Taken 4/15/2025 0000 by Hermelinda Nathan RN  Body Position: sitting up in bed  Taken 4/14/2025 2200 by Hermelinda Nathan RN  Body Position: sitting up in bed  Taken 4/14/2025 2000 by Hermelinda Nathan RN  Body Position: sitting up in bed  Skin Protection: incontinence pads utilized  Intervention: Prevent Infection  Description: Maintain skin and mucous membrane integrity; promote hand, oral and pulmonary hygiene.Optimize fluid balance, nutrition, sleep and glycemic control to maximize infection resistance.Identify potential sources of infection early to prevent or mitigate progression of infection (e.g., wound, lines, devices).Evaluate ongoing need for invasive devices; remove promptly when no longer indicated.Review vaccination status.  Recent Flowsheet Documentation  Taken 4/15/2025 0200 by Hermelinda Nathan RN  Infection Prevention: single patient room provided  Taken 4/15/2025 0000 by Hermelinda Nathan RN  Infection Prevention: single patient room provided  Taken 4/14/2025 2200 by Hermelinda Nathan RN  Infection Prevention: environmental surveillance performed  Taken 4/14/2025 2000 by Hermelinda Nathan RN  Infection Prevention: environmental surveillance performed  Goal: Optimal Comfort and Wellbeing  Outcome: Progressing  Intervention: Monitor Pain and Promote Comfort  Description:  Assess pain level, treatment efficacy and patient response at regular intervals using a consistent pain scale.Consider the presence and impact of preexisting chronic pain.Encourage patient and caregiver involvement in pain assessment, interventions and safety measures.Promote activity; balance with sleep and rest to enhance healing.  Recent Flowsheet Documentation  Taken 4/15/2025 0349 by Hermelinda Nathan RN  Pain Management Interventions: pain medication given  Intervention: Provide Person-Centered Care  Description: Use a family-focused approach to care; encourage support system presence and participation.Develop trust and rapport by proactively providing information, encouraging questions, addressing concerns and offering reassurance.Acknowledge emotional response to hospitalization.Recognize and utilize personal coping strategies and strengths; develop goals via shared decision-making.Honor spiritual and cultural preferences.  Recent Flowsheet Documentation  Taken 4/14/2025 2000 by Hermelinda Nathan RN  Trust Relationship/Rapport:   care explained   choices provided   questions answered   thoughts/feelings acknowledged  Goal: Readiness for Transition of Care  Outcome: Progressing     Problem: Skin Injury Risk Increased  Goal: Skin Health and Integrity  Outcome: Progressing  Intervention: Optimize Skin Protection  Description: Perform a full pressure injury risk assessment, as indicated by screening, upon admission to care unit.Reassess skin (full inspection and injury risk, including skin temperature, consistency and color) frequently (e.g., scheduled interval, with change in condition) to provide optimal early detection and prevention.Maintain adequate tissue perfusion (e.g., encourage fluid balance; avoid crossing legs, constrictive clothing or devices) to promote tissue oxygenation.Maintain head of bed at lowest degree of elevation tolerated, considering medical condition and other restrictions. Use  positioning supports to prevent sliding and friction. Consider low friction textiles.Avoid positioning onto an area that remains reddened or on bony prominences.Minimize incontinence and moisture (e.g., toileting schedule; moisture-wicking pad, diaper or incontinence collection device; skin moisture barrier).Cleanse skin promptly and gently, when soiled, utilizing a pH-balanced cleanser.Relieve and redistribute pressure (e.g., scheduled position changes, weight shifts, use of support surface, medical device repositioning, protective dressing application, use of positioning device, microclimate control, use of pressure-injury-monitorEncourage increased activity, such as sitting in a chair at the bedside or early mobilization, when able to tolerate. Avoid prolonged sitting.  Recent Flowsheet Documentation  Taken 4/15/2025 0200 by Hermelinda Nathan RN  Activity Management: activity encouraged  Head of Bed (HOB) Positioning: HOB elevated  Taken 4/15/2025 0000 by Hermelinda Nathan RN  Activity Management: activity encouraged  Head of Bed (HOB) Positioning: HOB elevated  Taken 4/14/2025 2200 by Hermelinda Nathan RN  Activity Management: activity encouraged  Head of Bed (HOB) Positioning: HOB elevated  Taken 4/14/2025 2000 by Hermelinda Nathan RN  Activity Management: activity encouraged  Pressure Reduction Techniques: frequent weight shift encouraged  Head of Bed (HOB) Positioning: HOB elevated  Pressure Reduction Devices: positioning supports utilized  Skin Protection: incontinence pads utilized     Problem: Comorbidity Management  Goal: Maintenance of COPD Symptom Control  Outcome: Progressing  Intervention: Maintain COPD (Chronic Obstructive Pulmonary Disease) Symptom Control  Description: Evaluate adherence to self-management (COPD action plan), such as medication, symptom control, trigger avoidance, infection prevention and self-monitoring.Advocate for continuation of home regimen, including oxygen,  medication and noninvasive positive pressure use.Anticipate the need for breathing techniques and activity pacing to minimize fatigue and breathlessness.Assess for proper use of inhaled medication and delivery technique; assist or reinstruct if needed.Evaluate effectiveness of coping skills; encourage expression of feelings, expectations and concerns related to disease management and quality of life; reinforce education to enhance management plan and wellbeing.  Recent Flowsheet Documentation  Taken 4/15/2025 0200 by Hermelinda Nathan RN  Medication Review/Management: medications reviewed  Taken 4/15/2025 0000 by Hermelinda Nathan RN  Medication Review/Management: medications reviewed  Taken 4/14/2025 2200 by Hermelinda Nathan RN  Medication Review/Management: medications reviewed  Taken 4/14/2025 2000 by Hermelinda Nathan RN  Medication Review/Management: medications reviewed  Goal: Maintenance of Heart Failure Symptom Control  Outcome: Progressing  Intervention: Maintain Heart Failure Management  Description: Evaluate adherence to home heart failure self-care regimen (e.g., medication, fluid balance, sodium intake, daily weight, physical activity, telemonitoring, support).Advocate continuation of home medication and schedule.Consider pharmacologic therapy administration time and effects (e.g., avoid giving diuretic prior to bedtime or nitrates on empty stomach).Monitor response to pharmacologic therapy, including weight fluctuations, blood pressure and electrolyte levels.Monitor for signs and symptoms of anxiety and depression, including severity and duration; if present, provide psychosocial support.Consider need for heart failure clinic or palliative care consult.  Recent Flowsheet Documentation  Taken 4/15/2025 0200 by Hermelinda Nathan RN  Medication Review/Management: medications reviewed  Taken 4/15/2025 0000 by Hermelinda Nathan RN  Medication Review/Management: medications reviewed  Taken  4/14/2025 2200 by Hermelinda Nathan, RN  Medication Review/Management: medications reviewed  Taken 4/14/2025 2000 by Hermelinda Nathan RN  Medication Review/Management: medications reviewed     Problem: Fall Injury Risk  Goal: Absence of Fall and Fall-Related Injury  Outcome: Progressing  Intervention: Identify and Manage Contributors  Description: Develop a fall prevention plan, considering patient-centered interventions and family/caregiver involvement; identify and address patient's facilitators and barriers.Provide reorientation, appropriate sensory stimulation and routines with changes in mental status to decrease risk of fall.Promote use of personal vision and auditory aids.Assess assistance level required for safe and effective self-care; provide support as needed, such as toileting and mobilization. For age 65 and older, implement timed toileting with assistance.Encourage physical activity, such as performance of mobility and self-care at highest level of patient ability, multicomponent exercise program and provision of appropriate assistive devices.If fall occurs, assess the severity of injury; implement fall injury protocol. Determine the cause and revise fall injury prevention plan.Regularly review and advocate for medication adjustment to decrease fall risk; consider administration times, polypharmacy and age.Balance adequate pain management with potential for oversedation.  Recent Flowsheet Documentation  Taken 4/15/2025 0200 by Hermelinda Nathan RN  Medication Review/Management: medications reviewed  Taken 4/15/2025 0000 by Hermelinda Nathan RN  Medication Review/Management: medications reviewed  Taken 4/14/2025 2200 by Hermelinda Nathan, RN  Medication Review/Management: medications reviewed  Taken 4/14/2025 2000 by Hermelinda Nathan RN  Medication Review/Management: medications reviewed  Intervention: Promote Injury-Free Environment  Description: Provide a safe, barrier-free environment  that encourages independent activity.Keep care area uncluttered and well-lighted.Determine need for increased observation or monitoring.Avoid use of devices that minimize mobility, such as restraints or indwelling urinary catheter.  Recent Flowsheet Documentation  Taken 4/15/2025 0200 by Hermelinda Nathan, RN  Safety Promotion/Fall Prevention: safety round/check completed  Taken 4/15/2025 0000 by Hermelinda Nathan, RN  Safety Promotion/Fall Prevention: safety round/check completed  Taken 4/14/2025 2200 by Hermelinda Nathan, RN  Safety Promotion/Fall Prevention: safety round/check completed  Taken 4/14/2025 2000 by Hermelinda Nathan, RN  Safety Promotion/Fall Prevention: safety round/check completed

## 2025-04-15 NOTE — CASE MANAGEMENT/SOCIAL WORK
Case Management/Social Work    Patient Name:  Patsy Frazier  YOB: 1960  MRN: [id number]  Admit Date:  4/10/2025        Auth was approved. Pt can admit to [facility] today for STR. SW updated team.       Electronically signed by:  NANCY Cazares  04/15/25 08:59 EDT

## 2025-04-15 NOTE — CASE MANAGEMENT/SOCIAL WORK
Case Management Discharge Note                Selected Continued Care - Admitted Since 4/10/2025       Destination Coordination complete.      Service Provider Services Address Phone Fax Patient Preferred    [facility] [address] [address] [phone] --              Durable Medical Equipment    No services have been selected for the patient.                Dialysis/Infusion    No services have been selected for the patient.                Home Medical Care    No services have been selected for the patient.                Therapy    No services have been selected for the patient.                Community Resources    No services have been selected for the patient.                Community & DME    No services have been selected for the patient.                    Transportation Services  Ambulance: [facility]    Final Discharge Disposition Code: 03 - skilled nursing facility (SNF)

## 2025-04-16 LAB
BACTERIA SPEC AEROBE CULT: ABNORMAL
GRAM STN SPEC: ABNORMAL
GRAM STN SPEC: ABNORMAL

## 2025-04-16 RX ORDER — HYDROCODONE BITARTRATE AND ACETAMINOPHEN 5; 325 MG/1; MG/1
2 TABLET ORAL EVERY 6 HOURS PRN
Qty: 240 TABLET | Refills: 0 | Status: SHIPPED | OUTPATIENT
Start: 2025-04-16

## 2025-04-16 NOTE — TELEPHONE ENCOUNTER
(NEW ADMIT)    MALIKA REQUESTING MED REFILL FOR NORCO 5-325 MG.    DIRECTIONS: NORCO 5-325 MG 2 TABS PO Q 6 HRS PRN.

## 2025-04-21 NOTE — PROGRESS NOTES
Nursing Home History and Physical       Robert Schaeffer DO  [address] [place] [address] Phone: [phone]  Fax: [phone]     PATIENT NAME: Patsy Frazier                                                                          YOB: 1960           DATE OF SERVICE: 04/22/2025  FACILITY: [facility]    CHIEF COMPLAINT:  Nursing facility admission    History of Present Illness  The patient is a 64-year-old female with a history of COPD, hypothyroidism, stroke, and arthritis, who was recently hospitalized at [facility] from 04/10/2025 to 04/15/2025 for concerns of progressive generalized weakness and inability to manage self-care. Significant bilateral lower extremity edema with cellulitis was noted during hospitalization. Treatment included Rocephin and doxycycline, which was transitioned to Keflex and doxycycline for an additional 3 days upon discharge. Atrial fibrillation was observed, but not in RVR. Follow-up with cardiology was arranged upon discharge. Due to significant debility and weakness, she was transferred to this facility for further strengthening and rehabilitation.    A gradual reduction in swelling is reported following the completion of the antibiotic course. Active participation in physical therapy is noted, which she finds beneficial, and efforts are being made to transition out of her boot with AFOs.    Inconsistency in monitoring thyroid levels with her primary care physician and irregular medication adherence at home are acknowledged. The thyroid-stimulating hormone (TSH) level was elevated at 8.95 upon admission to this facility.    Disrupted sleep patterns are reported, characterized by intermittent sleep periods of 30 to 45 minutes followed by wakefulness, persisting throughout the night. Anxiety is identified as a contributing factor, a condition that was also present at home.    Inability to perform self-care tasks such as cutting toenails  "for the past 6 months is reported, resulting in discomfort and difficulty wearing shoes for extended periods.       PAST MEDICAL & SURGICAL HISTORY:   Past Medical History:   Diagnosis Date    Arthritis     Asthma     CHF (congestive heart failure)     COPD (chronic obstructive pulmonary disease)     Hypothyroidism     Stroke 1976    \"mini stroke\" at the age of 16, no deficits per pt report     SVT (supraventricular tachycardia)     prior hx of     Wears glasses       Past Surgical History:   Procedure Laterality Date    CARDIAC ELECTROPHYSIOLOGY PROCEDURE N/A 11/3/2017    Procedure: Ablation atrial flutter vs SVT;  Surgeon: Chavo Carr MD;  Location: [facility] INVASIVE LOCATION;  Service:     CHOLECYSTECTOMY  2010    NECK SURGERY  2007    TUBAL ABDOMINAL LIGATION  1981         MEDICATIONS:  I have reviewed and reconciled the patients medication list in the patients chart at the [facility] nursing [facility] on 04/22/2025.      ALLERGIES:  No Known Allergies      SOCIAL HISTORY:  Social History     Socioeconomic History    Marital status:    Tobacco Use    Smoking status: Every Day     Current packs/day: 0.50     Average packs/day: 0.5 packs/day for 40.0 years (20.0 ttl pk-yrs)     Types: Cigarettes    Smokeless tobacco: Never   Vaping Use    Vaping status: Never Used   Substance and Sexual Activity    Alcohol use: No    Drug use: No    Sexual activity: Defer       FAMILY HISTORY:  No family history on file.     REVIEW OF SYSTEMS:  Review of Systems   Musculoskeletal:  Positive for arthralgias and gait problem.   Psychiatric/Behavioral:  Positive for sleep disturbance.    All other systems reviewed and are negative.      PHYSICAL EXAMINATION:   VITAL SIGNS: /63   Pulse 78   Temp 97 °F (36.1 °C)   Resp 20   Ht 172.7 cm (68\")   Wt (!) 140 kg (309 lb)   SpO2 95%   BMI 46.98 kg/m²     Physical Exam  Vitals and nursing note reviewed.   Constitutional:       Appearance: Normal appearance. She is " well-developed. She is obese.   HENT:      Head: Normocephalic and atraumatic.      Nose: Nose normal.      Mouth/Throat:      Mouth: Mucous membranes are moist.      Pharynx: No oropharyngeal exudate.   Eyes:      General: No scleral icterus.     Conjunctiva/sclera: Conjunctivae normal.      Pupils: Pupils are equal, round, and reactive to light.   Neck:      Thyroid: No thyromegaly.   Cardiovascular:      Rate and Rhythm: Normal rate and regular rhythm.      Heart sounds: Normal heart sounds. No murmur heard.     No friction rub. No gallop.   Pulmonary:      Effort: Pulmonary effort is normal. No respiratory distress.      Breath sounds: Normal breath sounds. No wheezing.      Comments: On 2 L NC  Abdominal:      General: Bowel sounds are normal. There is no distension.      Palpations: Abdomen is soft.      Tenderness: There is no abdominal tenderness.   Musculoskeletal:         General: No deformity or signs of injury.      Cervical back: Normal range of motion and neck supple.      Right lower leg: Edema present.      Left lower leg: Edema present.   Lymphadenopathy:      Cervical: No cervical adenopathy.   Skin:     General: Skin is warm and dry.      Findings: No rash.      Comments: Long toenails   Neurological:      Mental Status: She is alert and oriented to person, place, and time.   Psychiatric:         Mood and Affect: Mood normal.         Behavior: Behavior normal.         RECORDS REVIEW:   Discharge Summary [facility] 4/15/25  Results  Labs   - CBC: 04/21/2025, Hemoglobin 10.8 g/dL, Hematocrit 33%, Platelets 346 x10^9/L, WBC 6.6 x10^9/L   - CMP: 04/21/2025, Potassium 4.1 mmol/L, Chloride 91 mmol/L, Carbon dioxide 44 mmol/L, Creatinine 0.4 mg/dL   - TSH: 04/21/2025, 8.954 µIU/mL    ASSESSMENT   Diagnoses and all orders for this visit:    1. Typical atrial flutter (Primary)    2. Obesity, Class III, BMI 40-49.9 (morbid obesity)    3. Cellulitis of lower extremity, unspecified laterality    4. Simple chronic  bronchitis    5. Hypoxia    6. Bilateral lower extremity edema    7. Acquired hypothyroidism    8. Insomnia, unspecified type    9. Osteoarthritis of right knee, unspecified osteoarthritis type        Assessment & Plan  1. Debility and weakness.  - Progressive generalized weakness and inability to manage self-care noted during hospitalization from 04/10/2025 to 04/15/2025.  - Continuing physical therapy (PT) and occupational therapy (OT) at the rehabilitation facility for strengthening.  - Monitoring progress with therapy; patient reports improvement in strength.  - Supportive care in the nursing facility will be continued.    2. Bilateral lower extremity cellulitis.  - Significant bilateral lower extremity edema with cellulitis noted during hospitalization.  - Completed antibiotic course with Keflex and doxycycline; showing signs of improvement.  - Physical exam findings indicate reduction in swelling.  - No further antibiotics required at this time; continue monitoring for signs of infection.    3. Atrial flutter.  - Atrial fibrillation noted during hospitalization, not in RVR.  - Stable on current medications; continues Xarelto for anticoagulation.  - Follow-up with cardiology arranged upon discharge from the hospital.  - Continue current medication regimen and monitor for any changes in cardiac status.    4. Morbid obesity.  - Complicates aspects of care and recovery time.  - Supportive care in the nursing facility will be continued.  - Monitoring for any complications related to obesity.  - Encourage participation in PT and OT to aid in weight management and overall health improvement.    5. Right knee joint osteoarthritis.  - History of right knee joint osteoarthritis.  - Continuing PT and OT at the rehabilitation facility to improve mobility and strength.  - Monitoring progress with therapy; patient reports improvement in mobility.  - Supportive care in the nursing facility will be continued.    6.  Hypothyroidism.  - TSH level elevated at 8.95 upon admission to this facility.  - Elevated TSH can occur in acute illness; recent cellulitis may be a contributing factor.  - Repeat TSH test to be conducted in 2 weeks to monitor for improvement.  - Continue daily dose of levothyroxine 200 mcg.    7. Chronic obstructive pulmonary disease (COPD).  - History of COPD.  - Condition stable with current inhaler regimen.  - Monitoring for any exacerbations or changes in respiratory status.  - Continue current inhaler regimen.    8. Lower extremity edema.  - Significant bilateral lower extremity edema noted during hospitalization.  - Condition stable with daily dose of Lasix 80 mg.  - Labs reviewed on 04/21/2025: potassium 4.1, chloride 91, carbon dioxide 44, creatinine 0.4, hemoglobin 10.8, hematocrit 33, platelets 346, WBC 6.6.  - Continue daily Lasix and monitor for any changes in edema.    9. Insomnia.  - Reports difficulty sleeping, waking frequently throughout the night.  - Melatonin 5 mg at night prescribed to help improve sleep.  - Monitoring response to melatonin; consider alternative treatments if necessary.  - Encourage sleep hygiene practices and supportive care in the nursing facility.    Podiatry has been consulted for toenail care.         [x]  Discussed Patient in detail with nursing/staff, addressed all needs today.     [x]  Plan of Care Reviewed   [x]  PT/OT Reviewed   [x]  Order Changes  []  Discharge Plans Reviewed  [x]  Advance Directive on file with Nursing Home.   [x]  POA on file with Nursing Home.    [x]  Code Status listed and reviewed.     Robert Schaeffer DO.  4/22/2025      **Part of this note may be an electronic transcription/translation of spoken language to printed text using the Dragon Dictation System.**    Patient or patient representative verbalized consent for the use of Ambient Listening during the visit with  Robert Schaeffer DO for chart documentation. 4/22/2025  17:04 EDT

## 2025-04-22 ENCOUNTER — NURSING HOME (OUTPATIENT)
Dept: INTERNAL MEDICINE | Facility: CLINIC | Age: 65
End: 2025-04-22
Payer: MEDICARE

## 2025-04-22 VITALS
HEIGHT: 68 IN | WEIGHT: 293 LBS | TEMPERATURE: 97 F | SYSTOLIC BLOOD PRESSURE: 129 MMHG | OXYGEN SATURATION: 95 % | RESPIRATION RATE: 20 BRPM | DIASTOLIC BLOOD PRESSURE: 63 MMHG | BODY MASS INDEX: 44.41 KG/M2 | HEART RATE: 78 BPM

## 2025-04-22 DIAGNOSIS — E66.01 OBESITY, CLASS III, BMI 40-49.9 (MORBID OBESITY): ICD-10-CM

## 2025-04-22 DIAGNOSIS — J41.0 SIMPLE CHRONIC BRONCHITIS: ICD-10-CM

## 2025-04-22 DIAGNOSIS — E03.9 ACQUIRED HYPOTHYROIDISM: ICD-10-CM

## 2025-04-22 DIAGNOSIS — R60.0 BILATERAL LOWER EXTREMITY EDEMA: ICD-10-CM

## 2025-04-22 DIAGNOSIS — M17.11 OSTEOARTHRITIS OF RIGHT KNEE, UNSPECIFIED OSTEOARTHRITIS TYPE: ICD-10-CM

## 2025-04-22 DIAGNOSIS — G47.00 INSOMNIA, UNSPECIFIED TYPE: ICD-10-CM

## 2025-04-22 DIAGNOSIS — L03.119 CELLULITIS OF LOWER EXTREMITY, UNSPECIFIED LATERALITY: ICD-10-CM

## 2025-04-22 DIAGNOSIS — I48.3 TYPICAL ATRIAL FLUTTER: Primary | ICD-10-CM

## 2025-04-22 DIAGNOSIS — R09.02 HYPOXIA: ICD-10-CM

## 2025-04-22 NOTE — LETTER
Nursing Home History and Physical       Robert Schaeffer DO  [address] [place] [address] Phone: [phone]  Fax: [phone]     PATIENT NAME: Patsy Frazier                                                                          YOB: 1960           DATE OF SERVICE: 04/22/2025  FACILITY: [facility]    CHIEF COMPLAINT:  Nursing facility admission    History of Present Illness  The patient is a 64-year-old female with a history of COPD, hypothyroidism, stroke, and arthritis, who was recently hospitalized at [facility] from 04/10/2025 to 04/15/2025 for concerns of progressive generalized weakness and inability to manage self-care. Significant bilateral lower extremity edema with cellulitis was noted during hospitalization. Treatment included Rocephin and doxycycline, which was transitioned to Keflex and doxycycline for an additional 3 days upon discharge. Atrial fibrillation was observed, but not in RVR. Follow-up with cardiology was arranged upon discharge. Due to significant debility and weakness, she was transferred to this facility for further strengthening and rehabilitation.    A gradual reduction in swelling is reported following the completion of the antibiotic course. Active participation in physical therapy is noted, which she finds beneficial, and efforts are being made to transition out of her boot with AFOs.    Inconsistency in monitoring thyroid levels with her primary care physician and irregular medication adherence at home are acknowledged. The thyroid-stimulating hormone (TSH) level was elevated at 8.95 upon admission to this facility.    Disrupted sleep patterns are reported, characterized by intermittent sleep periods of 30 to 45 minutes followed by wakefulness, persisting throughout the night. Anxiety is identified as a contributing factor, a condition that was also present at home.    Inability to perform self-care tasks such as cutting toenails  "for the past 6 months is reported, resulting in discomfort and difficulty wearing shoes for extended periods.       PAST MEDICAL & SURGICAL HISTORY:   Past Medical History:   Diagnosis Date   • Arthritis    • Asthma    • CHF (congestive heart failure)    • COPD (chronic obstructive pulmonary disease)    • Hypothyroidism    • Stroke 1976    \"mini stroke\" at the age of 16, no deficits per pt report    • SVT (supraventricular tachycardia)     prior hx of    • Wears glasses       Past Surgical History:   Procedure Laterality Date   • CARDIAC ELECTROPHYSIOLOGY PROCEDURE N/A 11/3/2017    Procedure: Ablation atrial flutter vs SVT;  Surgeon: Chavo Carr MD;  Location: [facility] INVASIVE LOCATION;  Service:    • CHOLECYSTECTOMY  2010   • NECK SURGERY  2007   • TUBAL ABDOMINAL LIGATION  1981         MEDICATIONS:  I have reviewed and reconciled the patients medication list in the patients chart at the skilled nursing facility on 04/22/2025.      ALLERGIES:  No Known Allergies      SOCIAL HISTORY:  Social History     Socioeconomic History   • Marital status:    Tobacco Use   • Smoking status: Every Day     Current packs/day: 0.50     Average packs/day: 0.5 packs/day for 40.0 years (20.0 ttl pk-yrs)     Types: Cigarettes   • Smokeless tobacco: Never   Vaping Use   • Vaping status: Never Used   Substance and Sexual Activity   • Alcohol use: No   • Drug use: No   • Sexual activity: Defer       FAMILY HISTORY:  No family history on file.     REVIEW OF SYSTEMS:  Review of Systems   Musculoskeletal:  Positive for arthralgias and gait problem.   Psychiatric/Behavioral:  Positive for sleep disturbance.    All other systems reviewed and are negative.      PHYSICAL EXAMINATION:   VITAL SIGNS: /63   Pulse 78   Temp 97 °F (36.1 °C)   Resp 20   Ht 172.7 cm (68\")   Wt (!) 140 kg (309 lb)   SpO2 95%   BMI 46.98 kg/m²     Physical Exam  Vitals and nursing note reviewed.   Constitutional:       Appearance: Normal " appearance. She is well-developed. She is obese.   HENT:      Head: Normocephalic and atraumatic.      Nose: Nose normal.      Mouth/Throat:      Mouth: Mucous membranes are moist.      Pharynx: No oropharyngeal exudate.   Eyes:      General: No scleral icterus.     Conjunctiva/sclera: Conjunctivae normal.      Pupils: Pupils are equal, round, and reactive to light.   Neck:      Thyroid: No thyromegaly.   Cardiovascular:      Rate and Rhythm: Normal rate and regular rhythm.      Heart sounds: Normal heart sounds. No murmur heard.     No friction rub. No gallop.   Pulmonary:      Effort: Pulmonary effort is normal. No respiratory distress.      Breath sounds: Normal breath sounds. No wheezing.      Comments: On 2 L NC  Abdominal:      General: Bowel sounds are normal. There is no distension.      Palpations: Abdomen is soft.      Tenderness: There is no abdominal tenderness.   Musculoskeletal:         General: No deformity or signs of injury.      Cervical back: Normal range of motion and neck supple.      Right lower leg: Edema present.      Left lower leg: Edema present.   Lymphadenopathy:      Cervical: No cervical adenopathy.   Skin:     General: Skin is warm and dry.      Findings: No rash.      Comments: Long toenails   Neurological:      Mental Status: She is alert and oriented to person, place, and time.   Psychiatric:         Mood and Affect: Mood normal.         Behavior: Behavior normal.         RECORDS REVIEW:   Discharge Summary [facility] 4/15/25  Results  Labs   - CBC: 04/21/2025, Hemoglobin 10.8 g/dL, Hematocrit 33%, Platelets 346 x10^9/L, WBC 6.6 x10^9/L   - CMP: 04/21/2025, Potassium 4.1 mmol/L, Chloride 91 mmol/L, Carbon dioxide 44 mmol/L, Creatinine 0.4 mg/dL   - TSH: 04/21/2025, 8.954 µIU/mL    ASSESSMENT   Diagnoses and all orders for this visit:    1. Typical atrial flutter (Primary)    2. Obesity, Class III, BMI 40-49.9 (morbid obesity)    3. Cellulitis of lower extremity, unspecified  laterality    4. Simple chronic bronchitis    5. Hypoxia    6. Bilateral lower extremity edema    7. Acquired hypothyroidism    8. Insomnia, unspecified type    9. Osteoarthritis of right knee, unspecified osteoarthritis type        Assessment & Plan  1. Debility and weakness.  - Progressive generalized weakness and inability to manage self-care noted during hospitalization from 04/10/2025 to 04/15/2025.  - Continuing physical therapy (PT) and occupational therapy (OT) at the rehabilitation facility for strengthening.  - Monitoring progress with therapy; patient reports improvement in strength.  - Supportive care in the nursing facility will be continued.    2. Bilateral lower extremity cellulitis.  - Significant bilateral lower extremity edema with cellulitis noted during hospitalization.  - Completed antibiotic course with Keflex and doxycycline; showing signs of improvement.  - Physical exam findings indicate reduction in swelling.  - No further antibiotics required at this time; continue monitoring for signs of infection.    3. Atrial flutter.  - Atrial fibrillation noted during hospitalization, not in RVR.  - Stable on current medications; continues Xarelto for anticoagulation.  - Follow-up with cardiology arranged upon discharge from the hospital.  - Continue current medication regimen and monitor for any changes in cardiac status.    4. Morbid obesity.  - Complicates aspects of care and recovery time.  - Supportive care in the nursing facility will be continued.  - Monitoring for any complications related to obesity.  - Encourage participation in PT and OT to aid in weight management and overall health improvement.    5. Right knee joint osteoarthritis.  - History of right knee joint osteoarthritis.  - Continuing PT and OT at the rehabilitation facility to improve mobility and strength.  - Monitoring progress with therapy; patient reports improvement in mobility.  - Supportive care in the nursing facility  will be continued.    6. Hypothyroidism.  - TSH level elevated at 8.95 upon admission to this facility.  - Elevated TSH can occur in acute illness; recent cellulitis may be a contributing factor.  - Repeat TSH test to be conducted in 2 weeks to monitor for improvement.  - Continue daily dose of levothyroxine 200 mcg.    7. Chronic obstructive pulmonary disease (COPD).  - History of COPD.  - Condition stable with current inhaler regimen.  - Monitoring for any exacerbations or changes in respiratory status.  - Continue current inhaler regimen.    8. Lower extremity edema.  - Significant bilateral lower extremity edema noted during hospitalization.  - Condition stable with daily dose of Lasix 80 mg.  - Labs reviewed on 04/21/2025: potassium 4.1, chloride 91, carbon dioxide 44, creatinine 0.4, hemoglobin 10.8, hematocrit 33, platelets 346, WBC 6.6.  - Continue daily Lasix and monitor for any changes in edema.    9. Insomnia.  - Reports difficulty sleeping, waking frequently throughout the night.  - Melatonin 5 mg at night prescribed to help improve sleep.  - Monitoring response to melatonin; consider alternative treatments if necessary.  - Encourage sleep hygiene practices and supportive care in the nursing facility.    Podiatry has been consulted for toenail care.         [x]  Discussed Patient in detail with nursing/staff, addressed all needs today.     [x]  Plan of Care Reviewed   [x]  PT/OT Reviewed   [x]  Order Changes  []  Discharge Plans Reviewed  [x]  Advance Directive on file with Nursing Home.   [x]  POA on file with Nursing Home.    [x]  Code Status listed and reviewed.     Robert Schaeffer DO.  4/22/2025      **Part of this note may be an electronic transcription/translation of spoken language to printed text using the Dragon Dictation System.**    Patient or patient representative verbalized consent for the use of Ambient Listening during the visit with  Robert Schaeffer DO for chart documentation.  4/22/2025  17:04 EDT

## (undated) DEVICE — DECANTER: Brand: UNBRANDED

## (undated) DEVICE — LIMB HOLDERS: Brand: DEROYAL

## (undated) DEVICE — INTRO SHEATH FASTCATH SAFL .038IN 8.5F 60CM

## (undated) DEVICE — DRSNG SURESITE123 4X4.8IN

## (undated) DEVICE — ST EXT IV SMARTSITE 2VLV SP M LL 5ML IV1

## (undated) DEVICE — ADULT, W/LG. BACK PAD, RADIOTRANSPARENT ELEMENT AND LEAD WIRE: Brand: DEFIBRILLATION ELECTRODES

## (undated) DEVICE — LEX ELECTRO PHYSIOLOGY: Brand: MEDLINE INDUSTRIES, INC.

## (undated) DEVICE — SOL NACL 0.9PCT 1000ML

## (undated) DEVICE — Device: Brand: THERMOCOOL SF NAV

## (undated) DEVICE — INTRO SHEATH ENGAGE W/50 GW .038 7F12

## (undated) DEVICE — Device: Brand: REFERENCE PATCH CARTO 3

## (undated) DEVICE — Device: Brand: MEDEX

## (undated) DEVICE — CATH QUAD CRD 6F5MM

## (undated) DEVICE — DRSNG SURESITE WNDW 2.38X2.75

## (undated) DEVICE — DECANT BG O JET

## (undated) DEVICE — ST INF PRI SMRTSTE 20DRP 2VLV 24ML 117

## (undated) DEVICE — SET PRIMARY GRVTY 10DP MALE LL 104IN

## (undated) DEVICE — CANN NASL CO2 DIVIDED A/

## (undated) DEVICE — Device: Brand: SMARTABLATE